# Patient Record
Sex: FEMALE | Race: WHITE | NOT HISPANIC OR LATINO | Employment: OTHER | URBAN - METROPOLITAN AREA
[De-identification: names, ages, dates, MRNs, and addresses within clinical notes are randomized per-mention and may not be internally consistent; named-entity substitution may affect disease eponyms.]

---

## 2023-10-05 ENCOUNTER — TELEPHONE (OUTPATIENT)
Dept: PRIMARY CARE | Facility: CLINIC | Age: 53
End: 2023-10-05
Payer: COMMERCIAL

## 2023-10-05 NOTE — TELEPHONE ENCOUNTER
Pt would like to know if [facility] sent the prescription ulternating pressure mattress. Please advise  [phone]

## 2023-10-12 PROBLEM — G80.9 CEREBRAL PALSY, UNSPECIFIED (MULTI): Status: ACTIVE | Noted: 2019-03-06

## 2023-10-12 PROBLEM — G89.4 CHRONIC PAIN SYNDROME: Status: ACTIVE | Noted: 2018-10-30

## 2023-10-12 PROBLEM — R06.2 WHEEZING: Status: ACTIVE | Noted: 2023-10-12

## 2023-11-05 DIAGNOSIS — G89.4 CHRONIC PAIN SYNDROME: Primary | ICD-10-CM

## 2023-11-07 DIAGNOSIS — R53.83 OTHER FATIGUE: Primary | ICD-10-CM

## 2023-11-07 DIAGNOSIS — B37.2 YEAST DERMATITIS: ICD-10-CM

## 2023-11-07 RX ORDER — NYSTATIN 100000 [USP'U]/G
1 POWDER TOPICAL 2 TIMES DAILY
Qty: 60 G | Refills: 3 | Status: SHIPPED | OUTPATIENT
Start: 2023-11-07 | End: 2024-01-25

## 2023-11-07 RX ORDER — NYSTATIN 100000 [USP'U]/G
1 POWDER TOPICAL 2 TIMES DAILY
COMMUNITY
Start: 2023-01-31 | End: 2023-11-07 | Stop reason: SDUPTHER

## 2023-11-07 RX ORDER — FERROUS SULFATE 325(65) MG
65 TABLET ORAL DAILY
COMMUNITY
Start: 2022-12-01 | End: 2023-11-07 | Stop reason: SDUPTHER

## 2023-11-07 RX ORDER — FERROUS SULFATE 325(65) MG
65 TABLET ORAL DAILY
Qty: 90 TABLET | Refills: 1 | Status: SHIPPED | OUTPATIENT
Start: 2023-11-07 | End: 2024-04-22

## 2023-11-07 RX ORDER — BUPRENORPHINE HYDROCHLORIDE 150 UG/1
FILM, SOLUBLE BUCCAL
COMMUNITY
End: 2024-03-13

## 2023-11-07 NOTE — TELEPHONE ENCOUNTER
Rx Refill Request Telephone Encounter    Name:  Rita Sabillon  :  [id number]  Medication Name:   Ferosul 325 mg. Nystatin powder [id number]             Specific Pharmacy location:  [facility] direct [phone]  Date of last appointment:    Date of next appointment:    Best number to reach patient:

## 2023-11-07 NOTE — TELEPHONE ENCOUNTER
KUSH CALLED INSISTING THAT HER LAST SCRIPT FOR OXYCODONE WAS FILLED ON OCTOBER 3,2023.  I CALLED HER AND TOLD HER THAT WAS NOT POSSIBLE  SINCE THE SCRIPT WAS NOT AVAILABLE FOR REFILL TIL OCT. 15,2023. SHE INSISTED THAT I CALLED THE DRUG STORE WHICH I DID AND THEY STATED THE SCRIPT WAS FILLED OCTOBER 17,2023. I CALLED HER BACK AGAIN AND SHE STATED THAT THAT WAS NOT TRUE AND THAT SHE NEEDED A BRIDGE SCRIPT TILL HER APPOINTMENT ON THE 17TH.  I TOLD HER HER APPOINTMENT WAS ON THE 14TH AND SHE SHOULD NOT HAVE RUN OUT OF HER OXYCODONE ALREADY.

## 2023-11-08 RX ORDER — BUPRENORPHINE HYDROCHLORIDE 150 UG/1
FILM, SOLUBLE BUCCAL
Qty: 60 EACH | OUTPATIENT
Start: 2023-11-08

## 2023-11-08 RX ORDER — BUPRENORPHINE HCL 150 MCG
150 FILM, MEDICATED (EA) BUCCAL EVERY 12 HOURS SCHEDULED
Qty: 60 EACH | Refills: 2 | Status: SHIPPED | OUTPATIENT
Start: 2023-11-08 | End: 2024-01-09 | Stop reason: SDUPTHER

## 2023-11-08 NOTE — TELEPHONE ENCOUNTER
The patient was mistaken and was calling about Belbuca, not Oxycodone. She is due for her Belbuca refill. This was sent to her pharmacy

## 2023-11-13 NOTE — PROGRESS NOTES
[facility] Pain Management  Follow Up Office Visit Note 2023    Patient Information: Rita Sabillon, MRN: [id number], : 1970   Primary Care/Referring Physician: Michelle Griggs PA-C, [address] [facility] [address] / [place]*     Chief Complaint: Neck pain  Interval History: At her last office visit I made no changes.      Today she reports no changes to her pain since last seen. She continues to report improvement in pain and function with minimal side effects from current Percocet and Belbuca    Brief History of Pain: Ms. Rita Sabillon is a 53 y.o. female with a PMHx of cerebral palsy, asthma who presents today for follow up regarding chronic neck pain and muscle spasms.            Current Pain Medications: Percocet 7.5/325 mg q12h PRN, Belbuca 150 mcg q12h, Baclofen 20 mg TID, Cyclobenzaprine 5 mg q8h PRN, Diclofenac 50 mg BID, Gabapentin 600 mg TID  Previously Tried Pain Medications:    Relevant Surgeries: Leg muscle release surgeries in the , extensive spine fusion in the   Injections: She reports getting injections in her neck in the past but they stopped working.  Physical/Occupational Therapy: The patient does not wish to pursue PT/OT at this time.    Medications:   Current Outpatient Medications   Medication Instructions    albuterol 90 mcg/actuation inhaler 2 puffs, inhalation, Every 6 hours PRN    albuterol 90 mcg/actuation inhaler 2 puffs, inhalation, Every 6 hours PRN    Belbuca 150 mcg buccal film PLACE 1 STRIP IN CHEEK EVERY 12 HOURS FOR 30 DAYS    buprenorphine (BELBUCA) 150 mcg, buccal, Every 12 hours scheduled    docusate sodium (Enemeez) 283 mg/5 mL enema 1 enema, rectal, Daily    docusate sodium (Enemeez) 283 mg/5 mL enema 1 enema, rectal, Daily    ferrous sulfate (FeroSuL) 325 (65 Fe) MG tablet 65 mg of iron, oral, Daily    gabapentin (Neurontin) 600 mg tablet 1 TAB BY MOUTH THREE TIMES A DAY    gabapentin (NEURONTIN) 600 mg, oral, 3 times  daily    nystatin (Mycostatin) 100,000 unit/gram powder 1 Application, Topical, 2 times daily, To affected area    [START ON 12/16/2023] oxyCODONE-acetaminophen (Percocet) 7.5-325 mg tablet 1 tablet, oral, 2 times daily PRN    [START ON 11/16/2023] oxyCODONE-acetaminophen (Percocet) 7.5-325 mg tablet 1 tablet, oral, 2 times daily PRN      Allergies: No Known Allergies    Past Medical & Surgical History:  Past Medical History:   Diagnosis Date    Asthma     Cerebral palsy (CMS/HCC)     Neuropathy     HANDS AND FEET    Osteoarthritis       Past Surgical History:   Procedure Laterality Date    OTHER SURGICAL HISTORY  07/13/2022    Back surgery    OTHER SURGICAL HISTORY  07/13/2022    Gallbladder surgery    OTHER SURGICAL HISTORY  07/20/2022    Suprapubic catheter insertion       No family history on file.  Social History     Socioeconomic History    Marital status: Single     Spouse name: Not on file    Number of children: Not on file    Years of education: Not on file    Highest education level: Not on file   Occupational History    Not on file   Tobacco Use    Smoking status: Never    Smokeless tobacco: Never   Substance and Sexual Activity    Alcohol use: Never    Drug use: Never    Sexual activity: Not on file   Other Topics Concern    Not on file   Social History Narrative    Not on file     Social Determinants of Health     Financial Resource Strain: Not on file   Food Insecurity: Not on file   Transportation Needs: Not on file   Physical Activity: Not on file   Stress: Not on file   Social Connections: Not on file   Intimate Partner Violence: Not on file   Housing Stability: Not on file       Problems, Past medical history, past surgical history, Medications, allergies, social and family history reviewed and as per the electronic medical record from today's encounter    Review of Systems:  CONST: No fever, chills, fatigue, weight changes  EYES: No loss of vision  ENT: No hearing loss, tinnitus  CV: No chest  "pain, palpitations  RESP: No dyspnea, shortness of breath, cough  GI: No stool incontinence, nausea, vomiting  : No urinary incontinence  MSK: No joint swelling  SKIN: No rash, no hives  NEURO: No headache, dizziness, weakness, paresthesias  PSYCH: No anxiety, depression or suicidal ideation  HEM/LYMPH: No easy bruising or bleeding  All other systems reviewed are negative     Physical Exam:  Vitals: BP (!) 134/91   Pulse 60   Ht 1.448 m (4' 9\")   Wt 72.6 kg (160 lb)   BMI 34.62 kg/m²   General: No apparent distress. Alert, appropriate, oriented x 3. Mood generally positive, affect congruent. Speaking in full sentences.   HENT: Normocephalic, atraumatic. Hearing intact.  Eyes: Pupils equal and round  Neck: Supple, trachea midline  Lungs: Symmetric respiratory excursion on visual exam, nonlabored breathing.   Extremities: No cyanosis noted in extremities.  Skin: No rashes, lesions noted.  Neuro: Alert and appropriate. Using a motorized wheelchair.    Laboratory Data:  The following laboratory data were reviewed during this visit:   Lab Results   Component Value Date    WBC 13.5 (H) 10/08/2021    RBC 4.06 10/08/2021    HGB 12.5 10/08/2021    HCT 38.9 10/08/2021     10/08/2021      Lab Results   Component Value Date    INR 1.0 10/08/2021     Lab Results   Component Value Date    CREATININE 0.3 (L) 10/08/2021    HGBA1C 6.0 2021       Imaging:  The following imaging impressions were reviewed by me during this visit:    - Imagin CT cervical spine shows DDD at C5-C6 with no significant canal stenosis seen. Mild cervical spondylosis resulting in mild narrowing of left-sided neural foramina at C5-C6 and C6-C7. Postoperative changes of the included upper thoracic spine.    I also personally reviewed the images from the above studies myself. These images and my interpretation of them contributed to the management and decision making of the patient's medical plan.    ASSESSMENT:  Ms. Rita Sabillon is " a 53 y.o. female with neck pain that is consistent with:    1. Cervicalgia    2. Long-term current use of opiate analgesic    3. Chronic pain syndrome        PLAN:  Radiology: I reviewed her CT cervical spine from 2016 which shows DDD at C5-C6 with no significant canal stenosis seen. Mild cervical spondylosis resulting in mild narrowing of left-sided neural foramina at C5-C6 and C6-C7. Postoperative changes of the included upper thoracic spine. No new diagnostics at this time    Physically: No changes at this time    Psychologically: No needs at this time    Medication: No changes to medication made today. Refills for Percocet provided today. Last oral toxicology appropriate    Duration: Multiple years    Intervention: No plans for intervention. She is hopefully getting a Baclofen pump implanted in the future but has been having difficulty getting this coordinated with her transportation    I will refill the patient's opioids today for 2 month.  The patient continues to see benefit and improvement in their quality of life and ability to maintain ADLs.  Patient educated about the risks of taking opioids and operating a motor vehicle.  Patient reports no adverse side effects to current medication regimen.  Current regimen does allow patient to maintain ADLs.  Patient reports no new neurologic symptoms, new pain areas, or exacerbation in pain today.  Patient reports they are happy with current treatment care path.    OAR[place] was reviewed and was consistent with the history.    Patient has been educated on the risks, benefits, and alternatives of controlled substances as well as the proper way to store these medications.  The patient and I discussed the nature of this medication and its side effects.  We discussed tolerance, physical dependence, psychological dependence, addiction and opioid-induced hyperalgesia.  We discussed the potential need to wean from this medication.  We discussed the availability of programs that  can help with this process if necessary.  We discussed safety issues related to opioids including safe storage.  We discussed the fact that the patient should not drive an automobile or operate heavy machinery while taking this medication.  A prescription for naloxone was offered to the patient.  The patient will be re-evaluated for the need to continue opioid therapy in 60-90 days.  A Pill Count Was Complete today and was consistent with prescriptions filled.  Most recent Toxicology reviewed and appropriate      Sincerely,  Sunil Morrow MD  [facility] - [place]

## 2023-11-14 ENCOUNTER — OFFICE VISIT (OUTPATIENT)
Dept: PAIN MEDICINE | Facility: CLINIC | Age: 53
End: 2023-11-14
Payer: COMMERCIAL

## 2023-11-14 VITALS
HEIGHT: 57 IN | SYSTOLIC BLOOD PRESSURE: 134 MMHG | BODY MASS INDEX: 34.52 KG/M2 | HEART RATE: 60 BPM | DIASTOLIC BLOOD PRESSURE: 91 MMHG | WEIGHT: 160 LBS

## 2023-11-14 DIAGNOSIS — G89.4 CHRONIC PAIN SYNDROME: ICD-10-CM

## 2023-11-14 DIAGNOSIS — M54.2 CERVICALGIA: Primary | ICD-10-CM

## 2023-11-14 DIAGNOSIS — Z79.891 LONG-TERM CURRENT USE OF OPIATE ANALGESIC: ICD-10-CM

## 2023-11-14 PROCEDURE — 99214 OFFICE O/P EST MOD 30 MIN: CPT | Performed by: STUDENT IN AN ORGANIZED HEALTH CARE EDUCATION/TRAINING PROGRAM

## 2023-11-14 PROCEDURE — 1036F TOBACCO NON-USER: CPT | Performed by: STUDENT IN AN ORGANIZED HEALTH CARE EDUCATION/TRAINING PROGRAM

## 2023-11-14 RX ORDER — OXYCODONE AND ACETAMINOPHEN 7.5; 325 MG/1; MG/1
1 TABLET ORAL 2 TIMES DAILY PRN
Qty: 60 TABLET | Refills: 0 | Status: SHIPPED | OUTPATIENT
Start: 2023-11-16 | End: 2023-12-16

## 2023-11-14 RX ORDER — OXYCODONE AND ACETAMINOPHEN 7.5; 325 MG/1; MG/1
1 TABLET ORAL 2 TIMES DAILY PRN
Qty: 60 TABLET | Refills: 0 | Status: SHIPPED | OUTPATIENT
Start: 2023-12-16 | End: 2024-01-09 | Stop reason: SDUPTHER

## 2023-11-14 ASSESSMENT — PATIENT HEALTH QUESTIONNAIRE - PHQ9
1. LITTLE INTEREST OR PLEASURE IN DOING THINGS: NOT AT ALL
SUM OF ALL RESPONSES TO PHQ9 QUESTIONS 1 AND 2: 0
2. FEELING DOWN, DEPRESSED OR HOPELESS: NOT AT ALL

## 2023-11-14 ASSESSMENT — PAIN DESCRIPTION - DESCRIPTORS: DESCRIPTORS: ACHING

## 2023-11-14 ASSESSMENT — PAIN - FUNCTIONAL ASSESSMENT: PAIN_FUNCTIONAL_ASSESSMENT: 0-10

## 2023-11-14 ASSESSMENT — PAIN SCALES - GENERAL: PAINLEVEL_OUTOF10: 7

## 2023-11-14 NOTE — PROGRESS NOTES
MEDICATION NAME: Oxycodone  STRENGTH: 7.5-325mg  LAST FILL DATE: 10/17/23  DATE LAST TAKEN: 23  QUANTITY FILLED: 60  QUANTITY REMAIN  COUNT COMPLETED BY: JOHNNY AVINA MA and ALLYSON HAGEN MA      UDS LAST COMPLETED:   CONTROLLED SUBSTANCES AGREEMENT LAST SIGNED:   ORT LAST COMPLETED:  Modified [employer] disability form filled out annually.    MEDICATION NAME: Belbuca  STRENGTH: 1450mcg  LAST FILL DATE: 23  DATE LAST TAKEN: 23  QUANTITY FILLED: 60  QUANTITY REMAIN  COUNT COMPLETED BY: JOHNNY AVINA MA and ALLYSON HAGEN MA      UDS LAST COMPLETED:   CONTROLLED SUBSTANCES AGREEMENT LAST SIGNED:   ORT LAST COMPLETED:  Modified [employer] disability form filled out annually.

## 2023-12-05 DIAGNOSIS — G80.0 SPASTIC QUADRIPLEGIC CEREBRAL PALSY (MULTI): ICD-10-CM

## 2023-12-05 DIAGNOSIS — G82.50 SPASTIC QUADRIPLEGIA (MULTI): ICD-10-CM

## 2023-12-05 DIAGNOSIS — G89.4 CHRONIC PAIN SYNDROME: ICD-10-CM

## 2023-12-05 DIAGNOSIS — K59.04 CHRONIC IDIOPATHIC CONSTIPATION: ICD-10-CM

## 2023-12-05 DIAGNOSIS — R06.2 WHEEZING: ICD-10-CM

## 2023-12-05 PROBLEM — J45.20 MILD INTERMITTENT ASTHMA (HHS-HCC): Status: ACTIVE | Noted: 2020-09-17

## 2023-12-05 PROBLEM — R31.0 FRANK HEMATURIA: Status: RESOLVED | Noted: 2023-12-05 | Resolved: 2023-12-05

## 2023-12-05 PROBLEM — N32.89 BLADDER SPASMS: Status: ACTIVE | Noted: 2019-06-11

## 2023-12-05 PROBLEM — L03.115 CELLULITIS OF RIGHT LEG: Status: RESOLVED | Noted: 2019-08-01 | Resolved: 2023-12-05

## 2023-12-05 PROBLEM — M54.50 LOW BACK PAIN, UNSPECIFIED: Status: RESOLVED | Noted: 2023-12-05 | Resolved: 2023-12-05

## 2023-12-05 PROBLEM — R30.9 URINARY PAIN: Status: RESOLVED | Noted: 2020-03-09 | Resolved: 2023-12-05

## 2023-12-05 PROBLEM — J45.909 UNSPECIFIED ASTHMA, UNCOMPLICATED (HHS-HCC): Status: ACTIVE | Noted: 2018-10-30

## 2023-12-05 PROBLEM — G62.9 NEUROPATHY: Status: ACTIVE | Noted: 2022-01-29

## 2023-12-05 PROBLEM — N30.91 HEMORRHAGIC CYSTITIS: Status: RESOLVED | Noted: 2021-10-14 | Resolved: 2023-12-05

## 2023-12-05 PROBLEM — H93.90 EAR DISORDER: Status: ACTIVE | Noted: 2023-12-05

## 2023-12-05 PROBLEM — B02.9 SHINGLES: Status: RESOLVED | Noted: 2020-06-15 | Resolved: 2023-12-05

## 2023-12-05 PROBLEM — M48.02 SPINAL STENOSIS IN CERVICAL REGION: Status: ACTIVE | Noted: 2023-12-05

## 2023-12-05 PROBLEM — R25.2 SPASTICITY: Status: ACTIVE | Noted: 2023-12-05

## 2023-12-05 PROBLEM — J45.20 INTRINSIC ASTHMA WITHOUT STATUS ASTHMATICUS (HHS-HCC): Status: RESOLVED | Noted: 2022-01-27 | Resolved: 2023-12-05

## 2023-12-05 PROBLEM — M79.10 MUSCLE TENSION PAIN: Status: ACTIVE | Noted: 2022-03-17

## 2023-12-05 PROBLEM — R39.9 UTI SYMPTOMS: Status: RESOLVED | Noted: 2019-04-23 | Resolved: 2023-12-05

## 2023-12-05 PROBLEM — G43.009 MIGRAINE WITHOUT AURA, NOT INTRACTABLE, WITHOUT STATUS MIGRAINOSUS: Status: ACTIVE | Noted: 2018-06-22

## 2023-12-05 PROBLEM — Z97.8 FOLEY CATHETER PRESENT: Status: RESOLVED | Noted: 2020-12-12 | Resolved: 2023-12-05

## 2023-12-05 PROBLEM — R82.90 CLOUDY URINE: Status: RESOLVED | Noted: 2020-12-12 | Resolved: 2023-12-05

## 2023-12-05 PROBLEM — R74.01 ELEVATED TRANSAMINASE LEVEL: Status: ACTIVE | Noted: 2021-05-12

## 2023-12-05 PROBLEM — S93.402A LEFT ANKLE SPRAIN: Status: RESOLVED | Noted: 2020-02-04 | Resolved: 2023-12-05

## 2023-12-05 PROBLEM — M81.0 AGE RELATED OSTEOPOROSIS: Status: ACTIVE | Noted: 2018-10-30

## 2023-12-05 PROBLEM — M54.12 RADICULOPATHY OF CERVICAL REGION: Status: ACTIVE | Noted: 2023-12-05

## 2023-12-05 PROBLEM — K21.9 GASTROESOPHAGEAL REFLUX DISEASE: Status: ACTIVE | Noted: 2022-01-27

## 2023-12-05 PROBLEM — R19.5 POSITIVE COLORECTAL CANCER SCREENING USING COLOGUARD TEST: Status: ACTIVE | Noted: 2021-06-21

## 2023-12-05 PROBLEM — E78.5 HYPERLIPIDEMIA, UNSPECIFIED: Status: ACTIVE | Noted: 2018-10-30

## 2023-12-05 PROBLEM — S99.919A INJURY OF ANKLE: Status: RESOLVED | Noted: 2023-12-05 | Resolved: 2023-12-05

## 2023-12-05 PROBLEM — M19.90 ARTHRITIS: Status: ACTIVE | Noted: 2023-12-05

## 2023-12-05 PROBLEM — R03.0 ELEVATED BLOOD PRESSURE READING: Status: ACTIVE | Noted: 2019-03-06

## 2023-12-05 PROBLEM — N39.0 URINARY TRACT INFECTION: Status: RESOLVED | Noted: 2020-09-17 | Resolved: 2023-12-05

## 2023-12-05 PROBLEM — G43.909 MIGRAINE HEADACHE: Status: ACTIVE | Noted: 2021-10-16

## 2023-12-05 PROBLEM — H10.9 CONJUNCTIVITIS: Status: RESOLVED | Noted: 2023-12-05 | Resolved: 2023-12-05

## 2023-12-05 PROBLEM — R06.00 DYSPNEA: Status: RESOLVED | Noted: 2023-12-05 | Resolved: 2023-12-05

## 2023-12-05 PROBLEM — H53.9 UNSPECIFIED VISUAL DISTURBANCE: Status: ACTIVE | Noted: 2021-09-23

## 2023-12-05 PROBLEM — R30.0 DYSURIA: Status: RESOLVED | Noted: 2023-12-05 | Resolved: 2023-12-05

## 2023-12-05 PROBLEM — R82.90 ABNORMAL URINE SEDIMENT: Status: RESOLVED | Noted: 2019-06-11 | Resolved: 2023-12-05

## 2023-12-05 RX ORDER — CHOLECALCIFEROL (VITAMIN D3) 125 MCG
CAPSULE ORAL
COMMUNITY
End: 2024-03-28 | Stop reason: SDUPTHER

## 2023-12-05 RX ORDER — CYCLOBENZAPRINE HCL 5 MG
TABLET ORAL
COMMUNITY
End: 2024-04-29 | Stop reason: SDUPTHER

## 2023-12-05 RX ORDER — NARATRIPTAN 2.5 MG/1
TABLET ORAL
COMMUNITY
Start: 2023-04-30 | End: 2024-03-26

## 2023-12-05 RX ORDER — PRUCALOPRIDE 2 MG/1
TABLET, FILM COATED ORAL
COMMUNITY
Start: 2022-03-10

## 2023-12-05 RX ORDER — SPIRONOLACTONE 25 MG/1
TABLET ORAL
COMMUNITY
Start: 2022-01-04

## 2023-12-05 RX ORDER — DOXEPIN HYDROCHLORIDE 50 MG/1
CAPSULE ORAL
Qty: 28 CAPSULE | Refills: 10 | Status: SHIPPED | OUTPATIENT
Start: 2023-12-05

## 2023-12-05 RX ORDER — ASPIRIN 81 MG/1
TABLET ORAL
COMMUNITY
Start: 2022-10-03

## 2023-12-05 RX ORDER — NALOXONE HYDROCHLORIDE 4 MG/.1ML
SPRAY NASAL
COMMUNITY
Start: 2023-11-09

## 2023-12-05 RX ORDER — GABAPENTIN 600 MG/1
TABLET ORAL
Qty: 84 TABLET | Refills: 10 | Status: SHIPPED | OUTPATIENT
Start: 2023-12-05

## 2023-12-05 RX ORDER — DOCUSATE SODIUM 283 MG/5ML
LIQUID RECTAL
Qty: 150 ML | Refills: 10 | Status: SHIPPED | OUTPATIENT
Start: 2023-12-05

## 2023-12-05 RX ORDER — BACLOFEN 20 MG/1
TABLET ORAL
Qty: 84 TABLET | Refills: 10 | Status: SHIPPED | OUTPATIENT
Start: 2023-12-05

## 2023-12-05 RX ORDER — VENLAFAXINE HYDROCHLORIDE 75 MG/1
CAPSULE, EXTENDED RELEASE ORAL
COMMUNITY
End: 2024-04-22

## 2023-12-05 RX ORDER — ALBUTEROL SULFATE 90 UG/1
2 AEROSOL, METERED RESPIRATORY (INHALATION) EVERY 6 HOURS PRN
Qty: 8.5 G | Refills: 10 | Status: SHIPPED | OUTPATIENT
Start: 2023-12-05

## 2023-12-05 RX ORDER — DOXEPIN HYDROCHLORIDE 50 MG/1
CAPSULE ORAL
COMMUNITY
Start: 2022-02-02 | End: 2023-12-05

## 2023-12-05 RX ORDER — MIRABEGRON 25 MG/1
TABLET, FILM COATED, EXTENDED RELEASE ORAL
COMMUNITY
Start: 2018-10-31

## 2023-12-05 RX ORDER — OMEPRAZOLE 40 MG/1
CAPSULE, DELAYED RELEASE ORAL
COMMUNITY
Start: 2021-06-01 | End: 2024-03-28 | Stop reason: ALTCHOICE

## 2023-12-05 RX ORDER — PERMETHRIN 50 MG/G
CREAM TOPICAL
COMMUNITY
Start: 2023-02-09 | End: 2024-03-28 | Stop reason: ALTCHOICE

## 2023-12-05 RX ORDER — ATORVASTATIN CALCIUM 20 MG/1
TABLET, FILM COATED ORAL EVERY 24 HOURS
COMMUNITY
Start: 2022-01-04

## 2023-12-05 RX ORDER — FLUTICASONE FUROATE 200 UG/1
POWDER RESPIRATORY (INHALATION)
COMMUNITY
Start: 2022-03-03 | End: 2024-02-27

## 2023-12-05 RX ORDER — BACLOFEN 20 MG/1
TABLET ORAL
COMMUNITY
Start: 2022-02-02 | End: 2023-12-05

## 2023-12-05 RX ORDER — FLUCONAZOLE 150 MG/1
TABLET ORAL
COMMUNITY
Start: 2023-05-25

## 2023-12-05 RX ORDER — DANTROLENE SODIUM 25 MG/1
CAPSULE ORAL
COMMUNITY
Start: 2022-07-13 | End: 2024-03-28 | Stop reason: ALTCHOICE

## 2023-12-05 RX ORDER — METOPROLOL SUCCINATE 25 MG/1
TABLET, EXTENDED RELEASE ORAL
COMMUNITY
Start: 2021-04-08

## 2023-12-05 RX ORDER — ERGOCALCIFEROL 1.25 MG/1
CAPSULE ORAL
COMMUNITY
Start: 2023-03-02 | End: 2024-01-25

## 2023-12-05 RX ORDER — OXYBUTYNIN CHLORIDE 5 MG/1
TABLET ORAL
COMMUNITY
Start: 2021-07-08

## 2023-12-05 RX ORDER — CETIRIZINE HYDROCHLORIDE 10 MG/1
TABLET ORAL
COMMUNITY
Start: 2023-04-30 | End: 2024-02-27

## 2023-12-05 RX ORDER — FLUTICASONE PROPIONATE 50 MCG
SPRAY, SUSPENSION (ML) NASAL
COMMUNITY
Start: 2023-03-02 | End: 2024-01-25

## 2023-12-05 RX ORDER — MONTELUKAST SODIUM 10 MG/1
TABLET ORAL
COMMUNITY
Start: 2022-02-02 | End: 2024-04-22

## 2023-12-05 RX ORDER — PANTOPRAZOLE SODIUM 40 MG/1
TABLET, DELAYED RELEASE ORAL EVERY 24 HOURS
COMMUNITY
Start: 2022-08-22

## 2023-12-05 RX ORDER — FAMOTIDINE 40 MG/1
TABLET, FILM COATED ORAL EVERY 24 HOURS
COMMUNITY
Start: 2022-11-28 | End: 2024-03-28 | Stop reason: ALTCHOICE

## 2023-12-05 RX ORDER — DICLOFENAC SODIUM 50 MG/1
TABLET, DELAYED RELEASE ORAL
COMMUNITY
Start: 2022-02-10

## 2024-01-08 NOTE — PROGRESS NOTES
[facility] Pain Management  Follow Up Office Visit Note 2024    Patient Information: Rita Sabillon, MRN: [id number], : 1970   Primary Care/Referring Physician: Michelle Griggs PA-C, [address] [facility] [address] / [place]     Chief Complaint: Neck pain  Interval History: At her last office visit I made no changes.      Today she reports no changes to her pain since last seen. She has been having slightly more hip pain since last seen and is going to start some PT for this soon. She continues to report improvement in pain and function with minimal side effects from current Percocet and Belbuca    Brief History of Pain: Ms. Rita Sabillon is a 53 y.o. female with a PMHx of cerebral palsy, asthma who presents today for follow up regarding chronic neck pain and muscle spasms.            Current Pain Medications: Percocet 7.5/325 mg q12h PRN, Belbuca 150 mcg q12h, Baclofen 20 mg TID, Cyclobenzaprine 5 mg q8h PRN, Diclofenac 50 mg BID, Gabapentin 600 mg TID  Previously Tried Pain Medications:    Relevant Surgeries: Leg muscle release surgeries in the , extensive spine fusion in the   Injections: She reports getting injections in her neck in the past but they stopped working.  Physical/Occupational Therapy: The patient does not wish to pursue PT/OT at this time.    Medications:   Current Outpatient Medications   Medication Instructions    albuterol 90 mcg/actuation inhaler 2 puffs, inhalation, Every 6 hours PRN    albuterol 90 mcg/actuation inhaler 2 puffs, inhalation, Every 6 hours PRN    Arnuity Ellipta 200 mcg/actuation inhaler INHALE ONE blister with device ONCE DAILY Inhalation for 30    aspirin 81 mg EC tablet TAKE ONE TABLET BY MOUTH ONCE DAILY Oral for 30    atorvastatin (Lipitor) 20 mg tablet Every 24 hours    baclofen (Lioresal) 20 mg tablet 1 TAB BY MOUTH THREE TIMES A DAY    Belbuca 150 mcg buccal film PLACE 1 STRIP IN CHEEK EVERY 12 HOURS FOR 30 DAYS     buprenorphine (BELBUCA) 150 mcg, buccal, Every 12 hours scheduled    cetirizine (ZyrTEC) 10 mg tablet See Instructions, Instructions: 1 TAB BY MOUTH ONCE A DAY, # 28 EA, 9 Refill(s), Type: Maintenance, Pharmacy: [id number], 1 TAB BY MOUTH ONCE A DAY    cholecalciferol (Vitamin D-3) 125 MCG (5000 UT) capsule oral    cyclobenzaprine (Flexeril) 5 mg tablet TAKE 1 TABLET BY MOUTH AS NEEDED EVERY 8 HOURS FOR 30 DAYS    dantrolene (Dantrium) 25 mg capsule oral    diclofenac (Voltaren) 50 mg EC tablet TAKE ONE TABLET BY MOUTH TWICE DAILY Oral for 30    docusate sodium (Enemeez) 283 mg/5 mL enema 1 RECTALLY ONCE A DAY    doxepin (SINEquan) 50 mg capsule 1 CAP BY MOUTH DAILY AT BEDTIME    ergocalciferol (Vitamin D-2) 1.25 MG (08435 UT) capsule See Instructions, Instructions: 1 CAP BY MOUTH WEEKLY, # 4 EA, 10 Refill(s), Type: Maintenance, Pharmacy: [facility], 1 CAP BY MOUTH WEEKLY    ferrous sulfate (FeroSuL) 325 (65 Fe) MG tablet 65 mg of iron, oral, Daily    fluconazole (Diflucan) 150 mg tablet oral    fluticasone (Flonase) 50 mcg/actuation nasal spray See Instructions, Instructions: 2 SPRAYS NASALLY ONCE A DAY AS DIRECTED, # 16 g, 10 Refill(s), Type: Maintenance, Pharmacy: [facility], 2 SPRAYS NASALLY ONCE A DAY AS DIRECTED    gabapentin (Neurontin) 600 mg tablet 1 TAB BY MOUTH THREE TIMES A DAY    metoprolol succinate XL (Toprol-XL) 25 mg 24 hr tablet TAKE ONE TABLET BY MOUTH ONCE DAILY Oral for 30    montelukast (Singulair) 10 mg tablet TAKE ONE TABLET BY MOUTH ONCE DAILY Oral for 30    Motegrity 2 mg tablet oral    Myrbetriq 25 mg tablet extended release 24 hr 24 hr tablet TAKE ONE TABLET BY MOUTH ONCE DAILY Oral for 30    naloxone (Narcan) 4 mg/0.1 mL nasal spray ADMINISTER A SINGLE SPRAY INTRANASALLY INTO ONE NOSTRIL. CALL 911. MAY REPEAT X 1.    naratriptan (Amerge) 2.5 mg tablet See Instructions, Instructions: 1 TAB BY MOUTH AS NEEDED AT ONSET OF MIGRAINE.;MAY REPEAT  DOSE ONCE AFTER 4 HOURS IF NEEDED, # 9 EA, 10 Refill(s), Type: Maintenance, Pharmacy: [facility], 1 TAB BY MOUTH AS NEEDED AT ONSET OF MIGRAINE.;MAY REPEAT DOSE ONCE AFTER 4 HOURS IF NEEDED    nystatin (Mycostatin) 100,000 unit/gram powder 1 Application, Topical, 2 times daily, To affected area    omeprazole (PriLOSEC) 40 mg DR capsule oral    oxybutynin (Ditropan) 5 mg tablet 1 tablet Orally three times per day    [START ON 2/15/2024] oxyCODONE-acetaminophen (Percocet) 7.5-325 mg tablet 1 tablet, oral, 2 times daily PRN    [START ON 1/16/2024] oxyCODONE-acetaminophen (Percocet) 7.5-325 mg tablet 1 tablet, oral, 2 times daily PRN    pantoprazole (ProtoNix) 40 mg EC tablet Every 24 hours    Pepcid 40 mg tablet Every 24 hours    permethrin (Elimite) 5 % cream APPLY TOPICALLY ONCE TO HEAD AND HAIR REMOVE AFTER 10 MINUTES    spironolactone (Aldactone) 25 mg tablet oral    venlafaxine XR (Effexor-XR) 75 mg 24 hr capsule TAKE ONE CAPSULE BY MOUTH ONCE DAILY Oral for 30      Allergies:   No Known Allergies      Past Medical & Surgical History:  Past Medical History:   Diagnosis Date    Abnormal urine sediment 06/11/2019    Asthma     Cellulitis of right leg 08/01/2019    Cerebral palsy (CMS/HCC)     Conjunctivitis 12/05/2023    Dyspnea 12/05/2023    Blanco catheter present 12/12/2020    Jaskaran hematuria 12/05/2023    Hemorrhagic cystitis 10/14/2021    Left ankle sprain 02/04/2020    Low back pain, unspecified 12/05/2023    Neuropathy     HANDS AND FEET    Osteoarthritis     Urinary pain 03/09/2020    Urinary tract infection 09/17/2020      Past Surgical History:   Procedure Laterality Date    OTHER SURGICAL HISTORY  07/13/2022    Back surgery    OTHER SURGICAL HISTORY  07/13/2022    Gallbladder surgery    OTHER SURGICAL HISTORY  07/20/2022    Suprapubic catheter insertion       No family history on file.  Social History     Socioeconomic History    Marital status: Single     Spouse name: Not on file    Number  "of children: Not on file    Years of education: Not on file    Highest education level: Not on file   Occupational History    Not on file   Tobacco Use    Smoking status: Never    Smokeless tobacco: Never   Substance and Sexual Activity    Alcohol use: Never    Drug use: Never    Sexual activity: Not on file   Other Topics Concern    Not on file   Social History Narrative    Not on file     Social Determinants of Health     Financial Resource Strain: Not on file   Food Insecurity: Not on file   Transportation Needs: Not on file   Physical Activity: Not on file   Stress: Not on file   Social Connections: Not on file   Intimate Partner Violence: Not on file   Housing Stability: Not on file       Problems, Past medical history, past surgical history, Medications, allergies, social and family history reviewed and as per the electronic medical record from today's encounter    Review of Systems:  CONST: No fever, chills, fatigue, weight changes  EYES: No loss of vision  ENT: No hearing loss, tinnitus  CV: No chest pain, palpitations  RESP: No dyspnea, shortness of breath, cough  GI: No stool incontinence, nausea, vomiting  : No urinary incontinence  MSK: No joint swelling  SKIN: No rash, no hives  NEURO: No headache, dizziness  PSYCH: No anxiety, depression or suicidal ideation  HEM/LYMPH: No easy bruising or bleeding  All other systems reviewed are negative     Physical Exam:  Vitals: /71   Pulse 94   Resp 16   Ht 1.448 m (4' 9\")   Wt 72.6 kg (160 lb)   BMI 34.62 kg/m²   General: No apparent distress. Alert, appropriate, oriented x 3. Mood generally positive, affect congruent. Speaking in full sentences.   HENT: Normocephalic, atraumatic. Hearing intact.  Eyes: Pupils equal and round  Neck: Supple, trachea midline  Lungs: Symmetric respiratory excursion on visual exam, nonlabored breathing.   Extremities: No cyanosis noted in extremities.  Skin: No rashes, lesions noted.  Neuro: Alert and appropriate. Using " a motorized wheelchair.    Laboratory Data:  The following laboratory data were reviewed during this visit:   Lab Results   Component Value Date    WBC 13.5 (H) 10/08/2021    RBC 4.06 10/08/2021    HGB 12.5 10/08/2021    HCT 38.9 10/08/2021     10/08/2021      Lab Results   Component Value Date    INR 1.0 10/08/2021     Lab Results   Component Value Date    CREATININE 0.3 (L) 10/08/2021    HGBA1C 6.0 2021       Imaging:  The following imaging impressions were reviewed by me during this visit:    - Imagin CT cervical spine shows DDD at C5-C6 with no significant canal stenosis seen. Mild cervical spondylosis resulting in mild narrowing of left-sided neural foramina at C5-C6 and C6-C7. Postoperative changes of the included upper thoracic spine.    I also personally reviewed the images from the above studies myself. These images and my interpretation of them contributed to the management and decision making of the patient's medical plan.    ASSESSMENT:  Ms. Rita Sabillon is a 53 y.o. female with neck pain that is consistent with:    1. Cervicalgia    2. Chronic pain syndrome    3. Long-term current use of opiate analgesic          PLAN:  Radiology: I reviewed her CT cervical spine from 2016 which shows DDD at C5-C6 with no significant canal stenosis seen. Mild cervical spondylosis resulting in mild narrowing of left-sided neural foramina at C5-C6 and C6-C7. Postoperative changes of the included upper thoracic spine. No new diagnostics at this time    Physically: No changes at this time    Psychologically: No needs at this time    Medication: No changes to medication made today. Refills for Percocet and Belbuca provided today. Last oral toxicology appropriate    Duration: Multiple years    Intervention: No plans for intervention.    I will refill the patient's opioids today for 2 month.  The patient continues to see benefit and improvement in their quality of life and ability to maintain  ADLs.  Patient educated about the risks of taking opioids and operating a motor vehicle.  Patient reports no adverse side effects to current medication regimen.  Current regimen does allow patient to maintain ADLs.  Patient reports no new neurologic symptoms, new pain areas, or exacerbation in pain today.  Patient reports they are happy with current treatment care path.    OARRS was reviewed and was consistent with the history.    Patient has been educated on the risks, benefits, and alternatives of controlled substances as well as the proper way to store these medications.  The patient and I discussed the nature of this medication and its side effects.  We discussed tolerance, physical dependence, psychological dependence, addiction and opioid-induced hyperalgesia.  We discussed the potential need to wean from this medication.  We discussed the availability of programs that can help with this process if necessary.  We discussed safety issues related to opioids including safe storage.  We discussed the fact that the patient should not drive an automobile or operate heavy machinery while taking this medication.  A prescription for naloxone was offered to the patient.  The patient will be re-evaluated for the need to continue opioid therapy in 60-90 days.  A Pill Count Was Complete today and was consistent with prescriptions filled.  Most recent Toxicology reviewed and appropriate      Sincerely,  Sunil Morrow MD  [facility] - [place]

## 2024-01-09 ENCOUNTER — APPOINTMENT (OUTPATIENT)
Dept: PAIN MEDICINE | Facility: CLINIC | Age: 54
End: 2024-01-09
Payer: COMMERCIAL

## 2024-01-09 ENCOUNTER — OFFICE VISIT (OUTPATIENT)
Dept: PAIN MEDICINE | Facility: CLINIC | Age: 54
End: 2024-01-09
Payer: COMMERCIAL

## 2024-01-09 VITALS
RESPIRATION RATE: 16 BRPM | WEIGHT: 160 LBS | DIASTOLIC BLOOD PRESSURE: 71 MMHG | HEIGHT: 57 IN | SYSTOLIC BLOOD PRESSURE: 143 MMHG | HEART RATE: 94 BPM | BODY MASS INDEX: 34.52 KG/M2

## 2024-01-09 DIAGNOSIS — Z79.891 LONG-TERM CURRENT USE OF OPIATE ANALGESIC: ICD-10-CM

## 2024-01-09 DIAGNOSIS — G89.4 CHRONIC PAIN SYNDROME: ICD-10-CM

## 2024-01-09 DIAGNOSIS — M54.2 CERVICALGIA: Primary | ICD-10-CM

## 2024-01-09 PROCEDURE — 99214 OFFICE O/P EST MOD 30 MIN: CPT | Performed by: STUDENT IN AN ORGANIZED HEALTH CARE EDUCATION/TRAINING PROGRAM

## 2024-01-09 PROCEDURE — 1036F TOBACCO NON-USER: CPT | Performed by: STUDENT IN AN ORGANIZED HEALTH CARE EDUCATION/TRAINING PROGRAM

## 2024-01-09 RX ORDER — BUPRENORPHINE HCL 150 MCG
150 FILM, MEDICATED (EA) BUCCAL EVERY 12 HOURS SCHEDULED
Qty: 60 EACH | Refills: 1 | Status: SHIPPED | OUTPATIENT
Start: 2024-01-09 | End: 2024-03-13

## 2024-01-09 RX ORDER — OXYCODONE AND ACETAMINOPHEN 7.5; 325 MG/1; MG/1
1 TABLET ORAL 2 TIMES DAILY PRN
Qty: 60 TABLET | Refills: 0 | Status: SHIPPED | OUTPATIENT
Start: 2024-01-16 | End: 2024-02-15

## 2024-01-09 RX ORDER — OXYCODONE AND ACETAMINOPHEN 7.5; 325 MG/1; MG/1
1 TABLET ORAL 2 TIMES DAILY PRN
Qty: 60 TABLET | Refills: 0 | Status: SHIPPED | OUTPATIENT
Start: 2024-02-15 | End: 2024-03-13 | Stop reason: SDUPTHER

## 2024-01-09 ASSESSMENT — PAIN - FUNCTIONAL ASSESSMENT: PAIN_FUNCTIONAL_ASSESSMENT: 0-10

## 2024-01-09 ASSESSMENT — PATIENT HEALTH QUESTIONNAIRE - PHQ9
10. IF YOU CHECKED OFF ANY PROBLEMS, HOW DIFFICULT HAVE THESE PROBLEMS MADE IT FOR YOU TO DO YOUR WORK, TAKE CARE OF THINGS AT HOME, OR GET ALONG WITH OTHER PEOPLE: SOMEWHAT DIFFICULT
2. FEELING DOWN, DEPRESSED OR HOPELESS: SEVERAL DAYS
SUM OF ALL RESPONSES TO PHQ9 QUESTIONS 1 AND 2: 2
1. LITTLE INTEREST OR PLEASURE IN DOING THINGS: SEVERAL DAYS

## 2024-01-09 ASSESSMENT — PAIN SCALES - GENERAL
PAINLEVEL_OUTOF10: 7
PAINLEVEL: 7

## 2024-01-09 ASSESSMENT — PAIN DESCRIPTION - DESCRIPTORS: DESCRIPTORS: ACHING

## 2024-01-09 NOTE — PROGRESS NOTES
MEDICATION NAME: Percocet  STRENGTH: 7.5/325mg  LAST FILL DATE: 23  DATE LAST TAKEN: 24  QUANTITY FILLED: 60  QUANTITY REMAININ  COUNT COMPLETED BY: ANA DICKINSON RN and SONNY Lee      UDS LAST COMPLETED:   CONTROLLED SUBSTANCES AGREEMENT LAST SIGNED:   ORT LAST COMPLETED:  Modified [employer] disability form filled out annually.    MEDICATION NAME:  Belcuca  STRENGTH: 150mcg  LAST FILL DATE: 23  DATE LAST TAKEN: 24  QUANTITY FILLED: 60  QUANTITY REMAININ  COUNT COMPLETED BY: ANA DICKINSON RN and SONNY Lee      UDS LAST COMPLETED:   CONTROLLED SUBSTANCES AGREEMENT LAST SIGNED:   ORT LAST COMPLETED:  Modified [employer] disability form filled out annually.

## 2024-01-25 DIAGNOSIS — E55.9 VITAMIN D DEFICIENCY: ICD-10-CM

## 2024-01-25 DIAGNOSIS — J45.20 MILD INTERMITTENT ASTHMA, UNSPECIFIED WHETHER COMPLICATED (HHS-HCC): ICD-10-CM

## 2024-01-25 DIAGNOSIS — B37.2 YEAST DERMATITIS: ICD-10-CM

## 2024-01-25 DIAGNOSIS — N32.89 BLADDER SPASMS: Primary | ICD-10-CM

## 2024-01-25 RX ORDER — ERGOCALCIFEROL 1.25 MG/1
1 CAPSULE ORAL
Qty: 4 CAPSULE | Refills: 10 | Status: SHIPPED | OUTPATIENT
Start: 2024-01-25

## 2024-01-25 RX ORDER — FLUTICASONE PROPIONATE 50 MCG
SPRAY, SUSPENSION (ML) NASAL
Qty: 16 G | Refills: 10 | Status: SHIPPED | OUTPATIENT
Start: 2024-01-25

## 2024-01-25 RX ORDER — NYSTATIN 100000 [USP'U]/G
POWDER TOPICAL
Qty: 120 G | Refills: 10 | Status: SHIPPED | OUTPATIENT
Start: 2024-01-25

## 2024-01-25 RX ORDER — NITROFURANTOIN (MACROCRYSTALS) 100 MG/1
100 CAPSULE ORAL DAILY
Qty: 28 CAPSULE | Refills: 10 | Status: SHIPPED | OUTPATIENT
Start: 2024-01-25 | End: 2024-03-28 | Stop reason: ALTCHOICE

## 2024-01-31 ENCOUNTER — APPOINTMENT (OUTPATIENT)
Dept: RADIOLOGY | Facility: HOSPITAL | Age: 54
End: 2024-01-31
Payer: COMMERCIAL

## 2024-01-31 ENCOUNTER — HOSPITAL ENCOUNTER (EMERGENCY)
Facility: HOSPITAL | Age: 54
Discharge: HOME | End: 2024-01-31
Attending: EMERGENCY MEDICINE
Payer: COMMERCIAL

## 2024-01-31 VITALS
WEIGHT: 168.7 LBS | DIASTOLIC BLOOD PRESSURE: 95 MMHG | HEART RATE: 100 BPM | OXYGEN SATURATION: 94 % | RESPIRATION RATE: 19 BRPM | SYSTOLIC BLOOD PRESSURE: 137 MMHG | BODY MASS INDEX: 36.39 KG/M2 | TEMPERATURE: 99 F | HEIGHT: 57 IN

## 2024-01-31 DIAGNOSIS — M25.571 ACUTE RIGHT ANKLE PAIN: Primary | ICD-10-CM

## 2024-01-31 DIAGNOSIS — I10 DIASTOLIC HYPERTENSION: ICD-10-CM

## 2024-01-31 PROCEDURE — 73610 X-RAY EXAM OF ANKLE: CPT | Mod: RT

## 2024-01-31 PROCEDURE — 2500000001 HC RX 250 WO HCPCS SELF ADMINISTERED DRUGS (ALT 637 FOR MEDICARE OP): Performed by: EMERGENCY MEDICINE

## 2024-01-31 PROCEDURE — 73630 X-RAY EXAM OF FOOT: CPT | Mod: RT

## 2024-01-31 PROCEDURE — 99284 EMERGENCY DEPT VISIT MOD MDM: CPT | Performed by: EMERGENCY MEDICINE

## 2024-01-31 RX ORDER — IBUPROFEN 400 MG/1
400 TABLET ORAL EVERY 8 HOURS PRN
Qty: 15 TABLET | Refills: 0 | Status: SHIPPED | OUTPATIENT
Start: 2024-01-31 | End: 2024-02-05

## 2024-01-31 RX ORDER — IBUPROFEN 600 MG/1
600 TABLET ORAL ONCE
Status: COMPLETED | OUTPATIENT
Start: 2024-01-31 | End: 2024-01-31

## 2024-01-31 RX ORDER — ACETAMINOPHEN 325 MG/1
650 TABLET ORAL ONCE
Status: COMPLETED | OUTPATIENT
Start: 2024-01-31 | End: 2024-01-31

## 2024-01-31 RX ADMIN — ACETAMINOPHEN 650 MG: 325 TABLET ORAL at 16:51

## 2024-01-31 RX ADMIN — IBUPROFEN 600 MG: 600 TABLET, FILM COATED ORAL at 16:51

## 2024-01-31 ASSESSMENT — COLUMBIA-SUICIDE SEVERITY RATING SCALE - C-SSRS
1. IN THE PAST MONTH, HAVE YOU WISHED YOU WERE DEAD OR WISHED YOU COULD GO TO SLEEP AND NOT WAKE UP?: NO
2. HAVE YOU ACTUALLY HAD ANY THOUGHTS OF KILLING YOURSELF?: NO
6. HAVE YOU EVER DONE ANYTHING, STARTED TO DO ANYTHING, OR PREPARED TO DO ANYTHING TO END YOUR LIFE?: NO

## 2024-01-31 ASSESSMENT — PAIN - FUNCTIONAL ASSESSMENT
PAIN_FUNCTIONAL_ASSESSMENT: 0-10
PAIN_FUNCTIONAL_ASSESSMENT: 0-10

## 2024-01-31 ASSESSMENT — PAIN DESCRIPTION - PROGRESSION: CLINICAL_PROGRESSION: NOT CHANGED

## 2024-01-31 ASSESSMENT — PAIN DESCRIPTION - LOCATION: LOCATION: ANKLE

## 2024-01-31 ASSESSMENT — PAIN DESCRIPTION - PAIN TYPE: TYPE: OTHER (COMMENT)

## 2024-01-31 ASSESSMENT — PAIN DESCRIPTION - ONSET: ONSET: ONGOING

## 2024-01-31 ASSESSMENT — PAIN SCALES - GENERAL
PAINLEVEL_OUTOF10: 5 - MODERATE PAIN
PAINLEVEL_OUTOF10: 7

## 2024-01-31 ASSESSMENT — PAIN DESCRIPTION - FREQUENCY: FREQUENCY: CONSTANT/CONTINUOUS

## 2024-01-31 NOTE — DISCHARGE INSTRUCTIONS
Follow-up with your primary care physician within 1 to 2 days for further management of your current symptoms and repeat check of your blood pressure.      Follow-up with podiatry for further management of your ankle/foot injury.      Return to the emergency department sooner with worsening of symptoms or onset of new symptoms

## 2024-01-31 NOTE — ED PROVIDER NOTES
HPI   Chief Complaint   Patient presents with    Ankle Pain     Patient states she hit her ankle on the bus in her wheelchair on Sunday.        HPI                    Mays Coma Scale Score: 15                  Patient History   Past Medical History:   Diagnosis Date    Abnormal urine sediment 06/11/2019    Asthma     Cellulitis of right leg 08/01/2019    Cerebral palsy (CMS/HCC)     Conjunctivitis 12/05/2023    Dyspnea 12/05/2023    Blanco catheter present 12/12/2020    Jaskaran hematuria 12/05/2023    Hemorrhagic cystitis 10/14/2021    Left ankle sprain 02/04/2020    Low back pain, unspecified 12/05/2023    Neuropathy     HANDS AND FEET    Osteoarthritis     Urinary pain 03/09/2020    Urinary tract infection 09/17/2020     Past Surgical History:   Procedure Laterality Date    OTHER SURGICAL HISTORY  07/13/2022    Back surgery    OTHER SURGICAL HISTORY  07/13/2022    Gallbladder surgery    OTHER SURGICAL HISTORY  07/20/2022    Suprapubic catheter insertion     No family history on file.  Social History     Tobacco Use    Smoking status: Never    Smokeless tobacco: Never   Substance Use Topics    Alcohol use: Never    Drug use: Never       Physical Exam   ED Triage Vitals [01/31/24 1547]   Temperature Heart Rate Respirations BP   37.3 °C (99.1 °F) (!) 111 20 (!) 146/101      Pulse Ox Temp Source Heart Rate Source Patient Position   94 % Oral Monitor Sitting      BP Location FiO2 (%)     Right arm --       Physical Exam    ED Course & MDM   Diagnoses as of 01/31/24 2238   Acute right ankle pain   Diastolic hypertension       Medical Decision Making    The patient is a 53-year-old female presenting to the emergency department by EMS from home for evaluation of right ankle and foot pain.  The patient reportedly does have chronic disability and is wheelchair dependent.  She states that she was sitting in her wheelchair when she was getting off the bus 5 days ago she injured her right ankle.  She states that he has had  persistent pain since then so she came to the emergency room today to get an x-ray.  She is nonweightbearing at all times.  This is a chronic issue.  No head injury or loss of consciousness.  No neck or back pain.  No chest pain or shortness of breath.  No abdominal pain.  No nausea or vomiting.  No diarrhea or constipation but no urinary complaints.  All pertinent positives and negatives are recorded above.  All other systems reviewed and otherwise negative.  Vital signs with hypertension and mild tachycardia but otherwise within normal limits.  Physical exam with a well-nourished well-developed female in no acute distress.  HEENT exam within normal limits.  She has no evidence of airway compromise or respiratory distress.  Abdominal exam is benign.  She has no gross motor, neurologic or vascular deficits on exam but she does have chronic deformities of both of her lower extremities.  She has no visible or palpable bony deformity.  No warmth, erythema or edema of her right foot or ankle.      Oral ibuprofen and oral acetaminophen ordered.    XR ankle right 3+ views   Final Result   No acute osseous abnormality..        MACRO:   None.        Signed by: Irais Head 1/31/2024 4:59 PM   Dictation workstation:   [id number]      XR foot right 3+ views   Final Result   Questionable nondisplaced fracture deformities at the distal   metatarsal head the 3rd through 5th digits. Clinical correlation and   correlation with site of pain is recommended.        MACRO:   None.        Signed by: Irais Head 1/31/2024 5:01 PM   Dictation workstation:   [id number]           The patient does not have obvious fracture on exam but there is questionable nondisplaced fractures/deformities of the right foot at the distal metatarsal head of the 3rd-5th digits.  This does not correlate to the majority of her pain which is in the ankle but the patient was placed in a postop shoe as a precaution.  The postop shoe was applied by  nursing staff.  The patient was neurovascularly intact after splint application.      The patient was released in good condition.  She was given a prescription for ibuprofen.  She was instructed to follow-up with her primary care physician within 1 to 2 days for further management of her current symptoms and repeat check of her blood pressure.  She was also given a referral to orthopedics/podiatry for further management of her foot/ankle pain.  She will return to the emergency department sooner with worsening of symptoms or onset of new symptoms.      Impression/diagnosis  Right ankle/foot contusion  Hypertension, unspecified      I reviewed the results of the diagnostic imaging.  Formal radiology reading was completed by the radiologist.    Procedure  Procedures     Kerry Hein MD  01/31/24 0641       Kerry Hein MD  01/31/24 5145

## 2024-02-20 ENCOUNTER — TELEPHONE (OUTPATIENT)
Dept: PAIN MEDICINE | Facility: CLINIC | Age: 54
End: 2024-02-20
Payer: COMMERCIAL

## 2024-02-20 NOTE — TELEPHONE ENCOUNTER
Patient called to let you know she received a letter from her insurance company stating they will no longer cover Belbuca.

## 2024-02-27 DIAGNOSIS — J45.20 MILD INTERMITTENT ASTHMA, UNSPECIFIED WHETHER COMPLICATED (HHS-HCC): ICD-10-CM

## 2024-02-27 RX ORDER — FLUTICASONE FUROATE 200 UG/1
POWDER RESPIRATORY (INHALATION)
Qty: 30 EACH | Refills: 0 | Status: SHIPPED | OUTPATIENT
Start: 2024-02-27 | End: 2024-04-22

## 2024-02-27 RX ORDER — CETIRIZINE HYDROCHLORIDE 10 MG/1
10 TABLET ORAL DAILY
Qty: 28 TABLET | Refills: 0 | Status: SHIPPED | OUTPATIENT
Start: 2024-02-27 | End: 2024-04-22

## 2024-02-27 NOTE — TELEPHONE ENCOUNTER
She is on a fairly low dose of Belbuca. I would recommend she start tapering this down by decreasing to 1 film once a day and then once she runs out of medication to stop. If her pain worsens after she stops the medication we can discuss additional options. Thanks

## 2024-03-08 ENCOUNTER — APPOINTMENT (OUTPATIENT)
Dept: PRIMARY CARE | Facility: CLINIC | Age: 54
End: 2024-03-08
Payer: COMMERCIAL

## 2024-03-12 ENCOUNTER — APPOINTMENT (OUTPATIENT)
Dept: PAIN MEDICINE | Facility: CLINIC | Age: 54
End: 2024-03-12
Payer: COMMERCIAL

## 2024-03-13 ENCOUNTER — OFFICE VISIT (OUTPATIENT)
Dept: PAIN MEDICINE | Facility: CLINIC | Age: 54
End: 2024-03-13
Payer: COMMERCIAL

## 2024-03-13 VITALS
WEIGHT: 168 LBS | DIASTOLIC BLOOD PRESSURE: 88 MMHG | BODY MASS INDEX: 36.24 KG/M2 | OXYGEN SATURATION: 95 % | HEIGHT: 57 IN | SYSTOLIC BLOOD PRESSURE: 138 MMHG | HEART RATE: 119 BPM

## 2024-03-13 DIAGNOSIS — M54.12 RADICULOPATHY OF CERVICAL REGION: ICD-10-CM

## 2024-03-13 DIAGNOSIS — G89.4 CHRONIC PAIN SYNDROME: ICD-10-CM

## 2024-03-13 DIAGNOSIS — M48.02 SPINAL STENOSIS IN CERVICAL REGION: Primary | ICD-10-CM

## 2024-03-13 DIAGNOSIS — Z79.891 LONG TERM CURRENT USE OF OPIATE ANALGESIC: ICD-10-CM

## 2024-03-13 PROCEDURE — 99214 OFFICE O/P EST MOD 30 MIN: CPT | Performed by: NURSE PRACTITIONER

## 2024-03-13 PROCEDURE — 1036F TOBACCO NON-USER: CPT | Performed by: NURSE PRACTITIONER

## 2024-03-13 RX ORDER — OXYCODONE AND ACETAMINOPHEN 7.5; 325 MG/1; MG/1
1 TABLET ORAL EVERY 8 HOURS PRN
Qty: 90 TABLET | Refills: 0 | Status: SHIPPED | OUTPATIENT
Start: 2024-03-13 | End: 2024-04-10 | Stop reason: SDUPTHER

## 2024-03-13 RX ORDER — BUPRENORPHINE 5 UG/H
1 PATCH TRANSDERMAL
Qty: 4 PATCH | Refills: 0 | Status: SHIPPED | OUTPATIENT
Start: 2024-03-13 | End: 2024-04-10 | Stop reason: SDUPTHER

## 2024-03-13 ASSESSMENT — PAIN - FUNCTIONAL ASSESSMENT: PAIN_FUNCTIONAL_ASSESSMENT: 0-10

## 2024-03-13 ASSESSMENT — PAIN DESCRIPTION - DESCRIPTORS: DESCRIPTORS: ACHING;SHARP

## 2024-03-13 ASSESSMENT — PAIN SCALES - GENERAL
PAINLEVEL_OUTOF10: 8
PAINLEVEL: 8

## 2024-03-13 ASSESSMENT — PATIENT HEALTH QUESTIONNAIRE - PHQ9: 2. FEELING DOWN, DEPRESSED OR HOPELESS: NOT AT ALL

## 2024-03-13 NOTE — PROGRESS NOTES
MEDICATION NAME: Oxycodone  STRENGTH: 7.5-325mg  LAST FILL DATE: 24  DATE LAST TAKEN: 3/13/24  QUANTITY FILLED: 60  QUANTITY REMAININ  COUNT COMPLETED BY: JOHNNY AVINA MA and ANA LUISA BERGMAN      UDS LAST COMPLETED:   CONTROLLED SUBSTANCES AGREEMENT LAST SIGNED:   ORT LAST COMPLETED:  Modified [employer] disability form filled out annually.

## 2024-03-13 NOTE — PROGRESS NOTES
Subjective   Patient ID: Rita Sabillon is a 53 y.o. female who presents for Pain Management.    HPI 54 YO Female with a PMHx significant for cerebral palsy, asthma, chronic neck pain, muscle spasms cervicalgia and chronic pain syndrome. She presents for a pain management follow-up and medication refill.  Today she reports no changes to her pain since her last visit with Dr. Morrow.  Her current pain regimen has consisted of Percocet 7.5-325 mg every 12 hours as needed and Belbuca 150 mcg every 12 hours.  In addition she takes baclofen 20 mg 3 times daily and cyclobenzaprine 5 mg every 8 hours as needed.  She also takes diclofenac 50 mg twice daily and gabapentin 600 mg 3 times daily.  Of note, Rita called the office recently stating that Belbuca was no longer covered by her insurance policy.  Dr. Morrow recommended she begin to taper off of it.  He informed the patient that if her pain worsened due to stopping the Belbuca he would discuss with her further options for medication management. Rita reports she is completed titrated off the Belbuca and is only taking her Percocet as directed, BID. She reports her pain is not well-controlled at this time. She reports she stays between a 7-8/10. She would like to see if there are any adjustments we can make to her medication regimen to provide additional pain relief.     Review of Systems Unless noted in the HPI all other systems have been reviewed and are negative for complaint.     Objective   Physical Exam  General- No acute distress, well appearing and well nourished. Obese  Eyes Conjunctiva and lids: No erythema, swelling or discharge  Neck - Supple, no cervical lymphadenopathy.   Pulmonary - Respiratory effort: Normal respiration.   Cardiovascular - Normal rate and rhythm.  Examination of extremities for edema and/or varicosities: No peripheral edema  Abdomen: Soft, Non-tender, non-distended, no abdominal masses.   Musculoskeletal - spine with reduced ROM;  especially cervical area. Bilateral shoulders with significantly reduced ROM. BLE with atrophy noted.   Skin - Skin and subcutaneous tissue: Normal without rashes or lesions.  Neurologic - Abnormal gait; use of motorized wheelchair for mobility. A&O x3.   Psychiatric - Orientation to person, place, and time: Normal. Mood and affect: Normal.    Assessment/Plan   Problem List Items Addressed This Visit       Chronic pain syndrome    Relevant Medications    oxyCODONE-acetaminophen (Percocet) 7.5-325 mg tablet    Spinal stenosis in cervical region - Primary    Relevant Medications    buprenorphine (Butrans) 5 mcg/hour patch    Radiculopathy of cervical region     Other Visit Diagnoses       Long term current use of opiate analgesic        Relevant Orders    Oral drug testing; [facility]; 409689 - Miscellaneous Test          TREATMENT PLAN:  I had a nice discussion with the patient today and our plan will be as follows:  Radiology: No new imaging to review at this time.   Physically: Encouraged patient to continue with increased physical activity as able.   Psychologically: No acute psychological needs at this time.    Medication: Discussed medications with my collaborating physician, Dr. Sunil Morrow, and we decided to increase her Percocet 7.5-325 from BID to TID for this next month. We are looking into her insurance to see if we can get Butrans approved. I have sent in an RX for Butrans 5mcg weekly patch. If we can get this approved through insurance, we can work on up-titrating as needed. This will be a better long-term option as it has the ability to control her pain better with less peaks and valleys. I will refill the patient's opioids today for [ 1 ] month.  The patient continues to see benefit and improvement in their quality of life and ability to maintain ADLs. Patient educated about the risks of taking opioids and operating a motor vehicle. Patient reports no adverse side effects to current medication  regimen.  Current regimen does allow patient to maintain ADLs.  Patient reports no new neurologic symptoms, new pain areas, or exacerbation in pain today.  Patient reports they are happy with current treatment care path. Patient has been educated on the risks, benefits, and alternatives of controlled substances as well as the proper way to store these medications. The patient and I discussed the nature of this medication and its side effects.  We discussed tolerance, physical dependence, psychological dependence, addiction and opioid-induced hyperalgesia.  We discussed the potential need to wean from this medication.  We discussed the availability of programs that can help with this process if necessary.  We discussed safety issues related to opioids including safe storage.  We discussed the fact that the patient should not drive an automobile or operate heavy machinery while taking this medication.  A prescription for naloxone was offered to the patient.  The patient will be re-evaluated for the need to continue opioid therapy in 60-90 days.  A PDMP report was reviewed today and was consistent with reported prescribing.  Toxicology screening is due; will obtain specimen today.  Duration: Chronic/ongoing.  Intervention: Adjusted medications. Will follow-up in 1 month to reassess.

## 2024-03-14 ENCOUNTER — APPOINTMENT (OUTPATIENT)
Dept: PRIMARY CARE | Facility: CLINIC | Age: 54
End: 2024-03-14
Payer: COMMERCIAL

## 2024-03-20 ENCOUNTER — TELEPHONE (OUTPATIENT)
Dept: PRIMARY CARE | Facility: CLINIC | Age: 54
End: 2024-03-20

## 2024-03-20 NOTE — TELEPHONE ENCOUNTER
Called out to check on patient since she missed appointment.  She advised she called to cancel her appointment.  She said she is doing fine, but couldn't get a caregiver to get there early enough to help her.  She requested to reschedule.  Transferred her to reception.

## 2024-03-21 ENCOUNTER — TELEPHONE (OUTPATIENT)
Dept: PRIMARY CARE | Facility: CLINIC | Age: 54
End: 2024-03-21
Payer: COMMERCIAL

## 2024-03-21 DIAGNOSIS — M25.561 RIGHT KNEE PAIN, UNSPECIFIED CHRONICITY: ICD-10-CM

## 2024-03-21 LAB — SCAN RESULT: NORMAL

## 2024-03-21 NOTE — TELEPHONE ENCOUNTER
PT has a CPE scheduled on 03-28-24 and she's requesting to have an X-Ray of her right knee.    PT can be reached at [phone].

## 2024-03-26 DIAGNOSIS — G43.009 MIGRAINE WITHOUT AURA, NOT INTRACTABLE, WITHOUT STATUS MIGRAINOSUS: Primary | ICD-10-CM

## 2024-03-26 RX ORDER — NARATRIPTAN 2.5 MG/1
TABLET ORAL
Qty: 9 TABLET | Refills: 10 | Status: SHIPPED | OUTPATIENT
Start: 2024-03-26

## 2024-03-28 ENCOUNTER — OFFICE VISIT (OUTPATIENT)
Dept: PRIMARY CARE | Facility: CLINIC | Age: 54
End: 2024-03-28
Payer: COMMERCIAL

## 2024-03-28 VITALS
DIASTOLIC BLOOD PRESSURE: 90 MMHG | SYSTOLIC BLOOD PRESSURE: 150 MMHG | TEMPERATURE: 97.6 F | HEART RATE: 96 BPM | OXYGEN SATURATION: 98 %

## 2024-03-28 DIAGNOSIS — M25.561 RIGHT KNEE PAIN, UNSPECIFIED CHRONICITY: ICD-10-CM

## 2024-03-28 DIAGNOSIS — J45.20 MILD INTERMITTENT ASTHMA WITHOUT COMPLICATION (HHS-HCC): ICD-10-CM

## 2024-03-28 DIAGNOSIS — G80.0 SPASTIC QUADRIPARESIS SECONDARY TO CEREBRAL PALSY (MULTI): Primary | ICD-10-CM

## 2024-03-28 DIAGNOSIS — K21.9 GASTROESOPHAGEAL REFLUX DISEASE WITHOUT ESOPHAGITIS: ICD-10-CM

## 2024-03-28 DIAGNOSIS — M48.02 SPINAL STENOSIS IN CERVICAL REGION: ICD-10-CM

## 2024-03-28 DIAGNOSIS — Z12.31 BREAST CANCER SCREENING BY MAMMOGRAM: ICD-10-CM

## 2024-03-28 DIAGNOSIS — E78.49 OTHER HYPERLIPIDEMIA: ICD-10-CM

## 2024-03-28 DIAGNOSIS — Z00.00 ROUTINE GENERAL MEDICAL EXAMINATION AT A HEALTH CARE FACILITY: ICD-10-CM

## 2024-03-28 PROCEDURE — 99396 PREV VISIT EST AGE 40-64: CPT | Performed by: PHYSICIAN ASSISTANT

## 2024-03-28 RX ORDER — SILVER SULFADIAZINE 10 G/1000G
CREAM TOPICAL
COMMUNITY
Start: 2024-03-04

## 2024-03-28 RX ORDER — POLYETHYLENE GLYCOL 3350 17 G/17G
POWDER, FOR SOLUTION ORAL
COMMUNITY
Start: 2024-03-04

## 2024-03-28 ASSESSMENT — PATIENT HEALTH QUESTIONNAIRE - PHQ9
SUM OF ALL RESPONSES TO PHQ9 QUESTIONS 1 AND 2: 0
2. FEELING DOWN, DEPRESSED OR HOPELESS: NOT AT ALL
1. LITTLE INTEREST OR PLEASURE IN DOING THINGS: NOT AT ALL

## 2024-03-28 ASSESSMENT — PAIN SCALES - GENERAL: PAINLEVEL: 6

## 2024-03-28 NOTE — PROGRESS NOTES
Subjective   Patient ID: Rita Sabillon is a 53 y.o. female who presents for Annual Exam.    HPI  Pt's PMH, FH and SH updated. She is working and doing so much better. She has a different group taking care of her at home and she is feeling great.  Review of Systems   Constitutional:  Negative for activity change and appetite change.   HENT:  Negative for dental problem.    Eyes:  Negative for visual disturbance.   Respiratory:  Negative for chest tightness, shortness of breath and wheezing.    Cardiovascular:  Negative for chest pain, palpitations and leg swelling.   Gastrointestinal:  Negative for abdominal pain, blood in stool, constipation, diarrhea and nausea.   Endocrine: Negative for cold intolerance and heat intolerance.   Genitourinary:  Negative for frequency and urgency.   Musculoskeletal:  Positive for arthralgias, back pain, myalgias and neck pain.   Skin:  Negative for color change.   Allergic/Immunologic: Negative for immunocompromised state.   Neurological:  Negative for dizziness, tremors and light-headedness.   Psychiatric/Behavioral:  Negative for behavioral problems and sleep disturbance. The patient is not nervous/anxious.        Objective   /90 (BP Location: Left arm)   Pulse 96   Temp 36.4 °C (97.6 °F) (Temporal)   SpO2 98%     Physical Exam  Constitutional:       Comments: Has spina bifida. In motorized wheel chair.   HENT:      Right Ear: Tympanic membrane normal.      Left Ear: Tympanic membrane normal.      Nose: Nose normal.      Mouth/Throat:      Mouth: Mucous membranes are moist.   Cardiovascular:      Rate and Rhythm: Normal rate and regular rhythm.      Pulses: Normal pulses.   Pulmonary:      Effort: Pulmonary effort is normal. No respiratory distress.   Abdominal:      General: Bowel sounds are normal.      Tenderness: There is no abdominal tenderness.   Musculoskeletal:      Cervical back: Normal range of motion. No tenderness.      Right lower leg: No edema.      Left  lower leg: No edema.   Skin:     General: Skin is warm.   Neurological:      General: No focal deficit present.      Mental Status: She is alert. Mental status is at baseline.   Psychiatric:         Mood and Affect: Mood normal.         Thought Content: Thought content normal.         Judgment: Judgment normal.         Assessment/Plan   Diagnoses and all orders for this visit:  Spastic quadriparesis secondary to cerebral palsy (CMS/HCC)  Breast cancer screening by mammogram  -     BI mammo bilateral screening tomosynthesis; Future  Spinal stenosis in cervical region  Right knee pain, unspecified chronicity  Routine general medical examination at a health care facility  -     CBC and Auto Differential; Future  -     Comprehensive Metabolic Panel; Future  -     Lipid Panel; Future  -     TSH with reflex to Free T4 if abnormal; Future  Gastroesophageal reflux disease without esophagitis  -     CBC and Auto Differential; Future  -     Comprehensive Metabolic Panel; Future  Mild intermittent asthma without complication  Other hyperlipidemia  -     Lipid Panel; Future  Other orders  -     Follow Up In Primary Care - Established; Future    She sees many specialists for all of her health issues she is UTD on them and continue her current medication regimen.

## 2024-03-31 PROBLEM — G89.29 CHRONIC PAIN: Status: ACTIVE | Noted: 2018-10-30

## 2024-03-31 PROBLEM — L03.119 CELLULITIS OF LOWER LIMB: Status: ACTIVE | Noted: 2019-08-01

## 2024-03-31 PROBLEM — Q05.9 SPINA BIFIDA (MULTI): Status: ACTIVE | Noted: 2023-09-12

## 2024-03-31 ASSESSMENT — ENCOUNTER SYMPTOMS
CHEST TIGHTNESS: 0
NAUSEA: 0
BLOOD IN STOOL: 0
WHEEZING: 0
DIZZINESS: 0
ARTHRALGIAS: 1
TREMORS: 0
BACK PAIN: 1
APPETITE CHANGE: 0
FREQUENCY: 0
COLOR CHANGE: 0
MYALGIAS: 1
LIGHT-HEADEDNESS: 0
DIARRHEA: 0
PALPITATIONS: 0
ACTIVITY CHANGE: 0
CONSTIPATION: 0
NECK PAIN: 1
NERVOUS/ANXIOUS: 0
ABDOMINAL PAIN: 0
SLEEP DISTURBANCE: 0
SHORTNESS OF BREATH: 0

## 2024-04-09 DIAGNOSIS — M48.02 SPINAL STENOSIS IN CERVICAL REGION: ICD-10-CM

## 2024-04-09 DIAGNOSIS — G89.4 CHRONIC PAIN SYNDROME: ICD-10-CM

## 2024-04-09 DIAGNOSIS — M54.12 RADICULOPATHY OF CERVICAL REGION: ICD-10-CM

## 2024-04-10 ENCOUNTER — OFFICE VISIT (OUTPATIENT)
Dept: PAIN MEDICINE | Facility: CLINIC | Age: 54
End: 2024-04-10
Payer: COMMERCIAL

## 2024-04-10 DIAGNOSIS — G89.4 CHRONIC PAIN SYNDROME: ICD-10-CM

## 2024-04-10 DIAGNOSIS — M54.12 RADICULOPATHY OF CERVICAL REGION: Primary | ICD-10-CM

## 2024-04-10 DIAGNOSIS — M48.02 SPINAL STENOSIS IN CERVICAL REGION: ICD-10-CM

## 2024-04-10 PROCEDURE — 99214 OFFICE O/P EST MOD 30 MIN: CPT | Performed by: NURSE PRACTITIONER

## 2024-04-10 RX ORDER — BUPRENORPHINE 5 UG/H
PATCH TRANSDERMAL
Qty: 4 PATCH | Refills: 0 | OUTPATIENT
Start: 2024-04-10

## 2024-04-10 RX ORDER — OXYCODONE AND ACETAMINOPHEN 7.5; 325 MG/1; MG/1
1 TABLET ORAL EVERY 8 HOURS PRN
Qty: 90 TABLET | Refills: 0 | Status: SHIPPED | OUTPATIENT
Start: 2024-05-12 | End: 2024-04-10 | Stop reason: RX

## 2024-04-10 RX ORDER — OXYCODONE AND ACETAMINOPHEN 7.5; 325 MG/1; MG/1
1 TABLET ORAL EVERY 6 HOURS PRN
Qty: 90 TABLET | Refills: 0 | Status: CANCELLED | OUTPATIENT
Start: 2024-04-12 | End: 2024-05-12

## 2024-04-10 RX ORDER — OXYCODONE AND ACETAMINOPHEN 7.5; 325 MG/1; MG/1
1 TABLET ORAL EVERY 6 HOURS PRN
Qty: 90 TABLET | Refills: 0 | Status: SHIPPED | OUTPATIENT
Start: 2024-04-12 | End: 2024-04-10 | Stop reason: RX

## 2024-04-10 RX ORDER — BUPRENORPHINE 5 UG/H
1 PATCH TRANSDERMAL
Qty: 4 PATCH | Refills: 1 | Status: SHIPPED | OUTPATIENT
Start: 2024-04-11 | End: 2024-05-09

## 2024-04-10 RX ORDER — BUPRENORPHINE 5 UG/H
1 PATCH TRANSDERMAL
Qty: 4 PATCH | Refills: 1 | Status: SHIPPED | OUTPATIENT
Start: 2024-04-11 | End: 2024-04-10

## 2024-04-10 RX ORDER — OXYCODONE AND ACETAMINOPHEN 7.5; 325 MG/1; MG/1
1 TABLET ORAL EVERY 8 HOURS PRN
Qty: 90 TABLET | Refills: 0 | Status: CANCELLED | OUTPATIENT
Start: 2024-05-12 | End: 2024-06-11

## 2024-04-10 RX ORDER — BUPRENORPHINE 5 UG/H
1 PATCH TRANSDERMAL
Qty: 4 PATCH | Refills: 1 | Status: CANCELLED | OUTPATIENT
Start: 2024-04-14 | End: 2024-05-12

## 2024-04-10 RX ORDER — OXYCODONE AND ACETAMINOPHEN 7.5; 325 MG/1; MG/1
1 TABLET ORAL EVERY 8 HOURS PRN
Qty: 90 TABLET | Refills: 0 | Status: SHIPPED | OUTPATIENT
Start: 2024-05-12 | End: 2024-06-11

## 2024-04-10 RX ORDER — OXYCODONE AND ACETAMINOPHEN 7.5; 325 MG/1; MG/1
1 TABLET ORAL EVERY 6 HOURS PRN
Qty: 90 TABLET | Refills: 0 | Status: SHIPPED | OUTPATIENT
Start: 2024-04-12 | End: 2024-05-12

## 2024-04-10 NOTE — PROGRESS NOTES
MEDICATION NAME: Oxycodone  STRENGTH: 7.5-325mg  LAST FILL DATE: 3/15/2024  DATE LAST TAKEN: 4/10/24  QUANTITY FILLED: 90  QUANTITY REMAININ  COUNT COMPLETED BY: JOHNNY AVINA MA and ANA LUISA BERGMAN    UDS LAST COMPLETED:   CONTROLLED SUBSTANCES AGREEMENT LAST SIGNED:   ORT LAST COMPLETED:  Modified [employer] disability form filled out annually.     MEDICATION NAME: Buprenephrine  STRENGTH: 5mcg  LAST FILL DATE: 3/14/24  DATE LAST TAKEN: 24  QUANTITY FILLED: 4  QUANTITY REMAININ  COUNT COMPLETED BY: JOHNNY AVINA MA and MADALYN NP    UDS LAST COMPLETED:   CONTROLLED SUBSTANCES AGREEMENT LAST SIGNED:   ORT LAST COMPLETED:  Modified [employer] disability form filled out annually.

## 2024-04-10 NOTE — TELEPHONE ENCOUNTER
Patient called and asked if medications can be sent to [facility].  She called previous pharmacy to have them transferred but they are controlled medications.

## 2024-04-10 NOTE — PROGRESS NOTES
Left detailed message on answering machine with test results. Pt has FYI. Subjective   Patient ID: Rita Sabillon is a 53 y.o. female who presents for Pain Management.    HPI 54 YO Female with a PMHx significant for cerebral palsy, asthma, chronic neck pain, muscle spasms cervicalgia and chronic pain syndrome. She presents for a pain management follow-up and medication refill.  Today she reports no changes to her pain since her last visit with Dr. Morrow.  Her current pain regimen has consisted of Percocet 7.5-325 mg every 12 hours as needed and Belbuca 150 mcg every 12 hours.  In addition she takes baclofen 20 mg 3 times daily and cyclobenzaprine 5 mg every 8 hours as needed.  She also takes diclofenac 50 mg twice daily and gabapentin 600 mg 3 times daily.  Of note, Rita was recently titrated off Belbuca due to insurance no longer covering and was started on Buprenorphine 5mcg/hr patch last visit. We also increased her Percocet from BID to TID while trialing the new regimen. Today she reports, that the change in her medication regimen has been significantly beneficial.  She reports that she was able to go back to work 4 days a week as well as participate in some recreational activities for enjoyment.    Review of Systems Unless noted in the HPI all other systems have been reviewed and are negative for complaint.     Objective   Physical Exam  General- No acute distress, well appearing and well nourished. Obese  Eyes Conjunctiva and lids: No erythema, swelling or discharge  Neck - Supple, no cervical lymphadenopathy.   Pulmonary - Respiratory effort: Normal respiration.   Cardiovascular - Normal rate and rhythm.  Examination of extremities for edema and/or varicosities: No peripheral edema  Abdomen: Soft, Non-tender, non-distended, no abdominal masses.   Musculoskeletal - spine with reduced ROM; especially cervical area. Bilateral shoulders with significantly reduced ROM. BLE with atrophy noted.   Skin - Skin and subcutaneous tissue: Normal without rashes or lesions.  Neurologic -  Abnormal gait; use of motorized wheelchair for mobility. A&O x3.   Psychiatric - Orientation to person, place, and time: Normal. Mood and affect: Normal.     Assessment/Plan   Problem List Items Addressed This Visit       Chronic pain    Relevant Medications    buprenorphine (Butrans) 5 mcg/hour patch (Start on 4/11/2024)    oxyCODONE-acetaminophen (Percocet) 7.5-325 mg tablet (Start on 5/12/2024)    oxyCODONE-acetaminophen (Percocet) 7.5-325 mg tablet (Start on 4/12/2024)    Spinal stenosis in cervical region    Relevant Medications    buprenorphine (Butrans) 5 mcg/hour patch (Start on 4/11/2024)    oxyCODONE-acetaminophen (Percocet) 7.5-325 mg tablet (Start on 5/12/2024)    oxyCODONE-acetaminophen (Percocet) 7.5-325 mg tablet (Start on 4/12/2024)    Radiculopathy of cervical region - Primary    Relevant Medications    buprenorphine (Butrans) 5 mcg/hour patch (Start on 4/11/2024)    oxyCODONE-acetaminophen (Percocet) 7.5-325 mg tablet (Start on 5/12/2024)    oxyCODONE-acetaminophen (Percocet) 7.5-325 mg tablet (Start on 4/12/2024)     TREATMENT PLAN:  I had a nice discussion with the patient today and our plan will be as follows:  Radiology: No new imaging to review at this time.   Physically: Encouraged patient to continue with increased physical activity as able.   Psychologically: No acute psychological needs at this time.    Medication: I will refill the patient's opioids today for [ 2 ] months.  The patient continues to see benefit and improvement in their quality of life and ability to maintain ADLs. Patient educated about the risks of taking opioids and operating a motor vehicle. Patient reports no adverse side effects to current medication regimen.  Current regimen does allow patient to maintain ADLs.  Patient reports no new neurologic symptoms, new pain areas, or exacerbation in pain today.  Patient reports they are happy with current treatment care path.    Patient has been educated on the risks,  benefits, and alternatives of controlled substances as well as the proper way to store these medications. The patient and I discussed the nature of this medication and its side effects.  We discussed tolerance, physical dependence, psychological dependence, addiction and opioid-induced hyperalgesia.  We discussed the potential need to wean from this medication.  We discussed the availability of programs that can help with this process if necessary.  We discussed safety issues related to opioids including safe storage.  We discussed the fact that the patient should not drive an automobile or operate heavy machinery while taking this medication.  A prescription for naloxone was offered to the patient.  The patient will be re-evaluated for the need to continue opioid therapy in 60-90 days.  A PDMP report was reviewed today and was consistent with reported prescribing.  Tox screen is up-to-date and consistent with prescribing history.  Duration: Chronic/ongoing.  Intervention: Nothing at this time.

## 2024-04-16 ENCOUNTER — APPOINTMENT (OUTPATIENT)
Dept: PAIN MEDICINE | Facility: CLINIC | Age: 54
End: 2024-04-16
Payer: COMMERCIAL

## 2024-04-20 DIAGNOSIS — R53.83 OTHER FATIGUE: ICD-10-CM

## 2024-04-20 DIAGNOSIS — J45.20 MILD INTERMITTENT ASTHMA, UNSPECIFIED WHETHER COMPLICATED (HHS-HCC): ICD-10-CM

## 2024-04-22 RX ORDER — MONTELUKAST SODIUM 10 MG/1
10 TABLET ORAL DAILY
Qty: 28 TABLET | Refills: 10 | Status: SHIPPED | OUTPATIENT
Start: 2024-04-22

## 2024-04-22 RX ORDER — VENLAFAXINE HYDROCHLORIDE 75 MG/1
75 CAPSULE, EXTENDED RELEASE ORAL DAILY
Qty: 28 CAPSULE | Refills: 10 | Status: SHIPPED | OUTPATIENT
Start: 2024-04-22

## 2024-04-22 RX ORDER — FLUTICASONE FUROATE 200 UG/1
POWDER RESPIRATORY (INHALATION)
Qty: 30 EACH | Refills: 10 | Status: SHIPPED | OUTPATIENT
Start: 2024-04-22

## 2024-04-22 RX ORDER — FERROUS SULFATE TAB 325 MG (65 MG ELEMENTAL FE) 325 (65 FE) MG
1 TAB ORAL DAILY
Qty: 28 TABLET | Refills: 10 | Status: SHIPPED | OUTPATIENT
Start: 2024-04-22

## 2024-04-22 RX ORDER — CETIRIZINE HYDROCHLORIDE 10 MG/1
10 TABLET ORAL DAILY
Qty: 28 TABLET | Refills: 10 | Status: SHIPPED | OUTPATIENT
Start: 2024-04-22

## 2024-04-29 DIAGNOSIS — M54.12 RADICULOPATHY OF CERVICAL REGION: Primary | ICD-10-CM

## 2024-04-29 RX ORDER — CYCLOBENZAPRINE HCL 5 MG
5 TABLET ORAL 3 TIMES DAILY PRN
Qty: 30 TABLET | Refills: 2 | Status: SHIPPED | OUTPATIENT
Start: 2024-04-29 | End: 2024-05-29

## 2024-05-09 DIAGNOSIS — M54.12 RADICULOPATHY OF CERVICAL REGION: ICD-10-CM

## 2024-05-09 DIAGNOSIS — G89.4 CHRONIC PAIN SYNDROME: ICD-10-CM

## 2024-05-09 DIAGNOSIS — M48.02 SPINAL STENOSIS IN CERVICAL REGION: ICD-10-CM

## 2024-05-16 RX ORDER — BUPRENORPHINE 5 UG/H
1 PATCH TRANSDERMAL
Qty: 4 PATCH | Refills: 1 | OUTPATIENT
Start: 2024-05-19 | End: 2024-06-16

## 2024-05-17 ENCOUNTER — HOSPITAL ENCOUNTER (EMERGENCY)
Facility: HOSPITAL | Age: 54
Discharge: HOME | End: 2024-05-17
Attending: STUDENT IN AN ORGANIZED HEALTH CARE EDUCATION/TRAINING PROGRAM
Payer: COMMERCIAL

## 2024-05-17 VITALS
SYSTOLIC BLOOD PRESSURE: 139 MMHG | HEART RATE: 93 BPM | OXYGEN SATURATION: 95 % | BODY MASS INDEX: 36.79 KG/M2 | RESPIRATION RATE: 16 BRPM | DIASTOLIC BLOOD PRESSURE: 78 MMHG | TEMPERATURE: 97.5 F | WEIGHT: 170 LBS

## 2024-05-17 DIAGNOSIS — N39.0 URINARY TRACT INFECTION ASSOCIATED WITH CATHETERIZATION OF URINARY TRACT, UNSPECIFIED INDWELLING URINARY CATHETER TYPE, INITIAL ENCOUNTER (CMS-HCC): Primary | ICD-10-CM

## 2024-05-17 DIAGNOSIS — T83.511A URINARY TRACT INFECTION ASSOCIATED WITH CATHETERIZATION OF URINARY TRACT, UNSPECIFIED INDWELLING URINARY CATHETER TYPE, INITIAL ENCOUNTER (CMS-HCC): Primary | ICD-10-CM

## 2024-05-17 LAB
APPEARANCE UR: CLEAR
BACTERIA #/AREA URNS AUTO: ABNORMAL /HPF
BILIRUB UR STRIP.AUTO-MCNC: NEGATIVE MG/DL
COLOR UR: COLORLESS
GLUCOSE UR STRIP.AUTO-MCNC: ABNORMAL MG/DL
KETONES UR STRIP.AUTO-MCNC: NEGATIVE MG/DL
LEUKOCYTE ESTERASE UR QL STRIP.AUTO: ABNORMAL
MUCOUS THREADS #/AREA URNS AUTO: ABNORMAL /LPF
NITRITE UR QL STRIP.AUTO: ABNORMAL
PH UR STRIP.AUTO: 6.5 [PH]
PROT UR STRIP.AUTO-MCNC: NEGATIVE MG/DL
RBC # UR STRIP.AUTO: ABNORMAL /UL
RBC #/AREA URNS AUTO: ABNORMAL /HPF
SP GR UR STRIP.AUTO: 1
SQUAMOUS #/AREA URNS AUTO: ABNORMAL /HPF
UROBILINOGEN UR STRIP.AUTO-MCNC: NORMAL MG/DL
WBC #/AREA URNS AUTO: ABNORMAL /HPF
WBC CLUMPS #/AREA URNS AUTO: ABNORMAL /HPF

## 2024-05-17 PROCEDURE — 81003 URINALYSIS AUTO W/O SCOPE: CPT | Performed by: STUDENT IN AN ORGANIZED HEALTH CARE EDUCATION/TRAINING PROGRAM

## 2024-05-17 PROCEDURE — 99283 EMERGENCY DEPT VISIT LOW MDM: CPT

## 2024-05-17 PROCEDURE — 87086 URINE CULTURE/COLONY COUNT: CPT | Mod: TRILAB,WESLAB | Performed by: STUDENT IN AN ORGANIZED HEALTH CARE EDUCATION/TRAINING PROGRAM

## 2024-05-17 RX ORDER — BUPRENORPHINE 5 UG/H
PATCH TRANSDERMAL
COMMUNITY
Start: 2024-05-09

## 2024-05-17 RX ORDER — CEFPODOXIME PROXETIL 200 MG/1
200 TABLET, FILM COATED ORAL 2 TIMES DAILY
Qty: 14 TABLET | Refills: 0 | Status: SHIPPED | OUTPATIENT
Start: 2024-05-17 | End: 2024-05-24

## 2024-05-17 RX ORDER — FAMOTIDINE 40 MG/1
TABLET, FILM COATED ORAL
COMMUNITY
Start: 2024-04-29

## 2024-05-17 ASSESSMENT — PAIN DESCRIPTION - LOCATION: LOCATION: ABDOMEN

## 2024-05-17 ASSESSMENT — COLUMBIA-SUICIDE SEVERITY RATING SCALE - C-SSRS
2. HAVE YOU ACTUALLY HAD ANY THOUGHTS OF KILLING YOURSELF?: NO
1. IN THE PAST MONTH, HAVE YOU WISHED YOU WERE DEAD OR WISHED YOU COULD GO TO SLEEP AND NOT WAKE UP?: NO
6. HAVE YOU EVER DONE ANYTHING, STARTED TO DO ANYTHING, OR PREPARED TO DO ANYTHING TO END YOUR LIFE?: NO

## 2024-05-17 ASSESSMENT — PAIN SCALES - GENERAL: PAINLEVEL_OUTOF10: 3

## 2024-05-17 ASSESSMENT — PAIN - FUNCTIONAL ASSESSMENT: PAIN_FUNCTIONAL_ASSESSMENT: 0-10

## 2024-05-17 NOTE — ED PROVIDER NOTES
HPI   Chief Complaint   Patient presents with    UTI     Uti like symptoms over last few days     Abdominal Pain     Bladder spasms and minor discomfort. New onset        HPI  See [facility]                  Ronda Coma Scale Score: 15                     Patient History   Past Medical History:   Diagnosis Date    Abnormal urine sediment 06/11/2019    Asthma ([facility])     Cellulitis of right leg 08/01/2019    Cerebral palsy ([facility])     Conjunctivitis 12/05/2023    Dyspnea 12/05/2023    Blanco catheter present 12/12/2020    Jaskaran hematuria 12/05/2023    Hemorrhagic cystitis 10/14/2021    Left ankle sprain 02/04/2020    Low back pain, unspecified 12/05/2023    Neuropathy     HANDS AND FEET    Osteoarthritis     Urinary pain 03/09/2020    Urinary tract infection 09/17/2020     Past Surgical History:   Procedure Laterality Date    OTHER SURGICAL HISTORY  07/13/2022    Back surgery    OTHER SURGICAL HISTORY  07/13/2022    Gallbladder surgery    OTHER SURGICAL HISTORY  07/20/2022    Suprapubic catheter insertion     No family history on file.  Social History     Tobacco Use    Smoking status: Never    Smokeless tobacco: Never   Vaping Use    Vaping status: Never Used   Substance Use Topics    Alcohol use: Never    Drug use: Never       Physical Exam   ED Triage Vitals [05/17/24 0952]   Temperature Heart Rate Respirations BP   36.4 °C (97.5 °F) 93 16 139/78      Pulse Ox Temp Source Heart Rate Source Patient Position   95 % Oral Monitor --      BP Location FiO2 (%)     -- --       Physical Exam  See [facility]  ED Course & [facility]   Diagnoses as of 05/17/24 1034   Urinary tract infection associated with catheterization of urinary tract, unspecified indwelling urinary catheter type, initial encounter (CMS-HCC)       Medical Decision Making  53-year-old female with past medical history of cerebral palsy, chronic indwelling suprapubic Blanco catheter who presents emergency room with UTI symptoms.  Patient feels as though her urine is  foul-smelling and otherwise feels as though she has had been having bladder spasms.  Patient otherwise denies recent fevers, chills, nausea, vomiting, chest pain, shortness of breath, diarrhea or constipation or antibiotic use.  Patient is convinced that she takes daily antibiotics for UTI prophylaxis but at this time upon review of her paperwork as well as her current medication list there appears to be no antibiotic prescription.    ED Triage Vitals [05/17/24 0952]   Temperature Heart Rate Respirations BP   36.4 °C (97.5 °F) 93 16 139/78      Pulse Ox Temp Source Heart Rate Source Patient Position   95 % Oral Monitor --      BP Location FiO2 (%)     -- --       Vital signs reviewed: Afebrile, nontachycardic, normotensive, 95% on room air    Exam     Constitutional: No acute distress. Resting comfortably.   Head: Normocephalic, atraumatic.   Eyes: Pupils equal bilaterally, EOM grossly intact, conjunctiva normal.  Mouth/Throat: Oropharynx is clear, moist mucus membranes.   Neck: Supple. No lymphadenopathy.  Cardiovascular: Regular rate and regular rhythm. Extremities are well-perfused.   Pulmonary/Chest: No respiratory distress, breathing comfortably on room air.    Abdominal: Soft, non-tender, non-distended. No rebound or guarding.  Suprapubic Blanco catheter in place, no significant erythema noted at site of insertion.  Clear urine drainage appreciated in Blanco tubing.  Musculoskeletal: No lower extremity edema.       Skin: Warm, dry, and intact.   Neurological: Patient is oriented to person, place, time, and situation. Face symmetric, hearing intact to voice, speech normal.    Differential includes but is not limited to:  UTI versus hematuria versus Blanco malfunction        Labs: ordered.  Labs Reviewed   URINALYSIS WITH REFLEX CULTURE AND MICROSCOPIC - Abnormal       Result Value    Color, Urine Colorless (*)     Appearance, Urine Clear      Specific Gravity, Urine 1.004 (*)     pH, Urine 6.5      Protein, Urine  NEGATIVE      Glucose, Urine 300 (3+) (*)     Blood, Urine OVER (3+) (*)     Ketones, Urine NEGATIVE      Bilirubin, Urine NEGATIVE      Urobilinogen, Urine Normal      Nitrite, Urine 2+ (*)     Leukocyte Esterase, Urine 500 Stacie/µL (*)    MICROSCOPIC ONLY, URINE - Abnormal    WBC, Urine 21-50 (*)     WBC Clumps, Urine RARE      RBC, Urine 3-5      Squamous Epithelial Cells, Urine 1-9 (SPARSE)      Bacteria, Urine 4+ (*)     Mucus, Urine FEW     URINE CULTURE   URINALYSIS WITH REFLEX CULTURE AND MICROSCOPIC    Narrative:     The following orders were created for panel order Urinalysis with Reflex Culture and Microscopic.  Procedure                               Abnormality         Status                     ---------                               -----------         ------                     Urinalysis with Reflex C...[[id number]]  Abnormal            Final result               Extra Urine Gray Tube[[id number]]                                                         Please view results for these tests on the individual orders.   EXTRA URINE GRAY TUBE       Radiology: Considered but not indicated    ECG/medicine tests: ordered and independent interpretation performed.  Diagnoses as of 05/17/24 1034   Urinary tract infection associated with catheterization of urinary tract, unspecified indwelling urinary catheter type, initial encounter (CMS-HCC)     Urinalysis shows evidence of urinary tract infection.  Unclear if this represents chronic colonization but given patient's symptoms we will treat at this time with antibiotics.  Patient otherwise afebrile and hemodynamically stable here in the emergency room and stable at this time for discharge with follow-up with primary care provider and follow-up with urine cultures.    PLAN AND FOLLOW-UP: Patient counseled on all findings, diagnosis and treatment plan. Patient's questions and concerns addressed. Patient stable, discharged with instructions to follow up with PMD, and to  return to ED at any time for worsening symptoms or any other concerns. Patient demonstrates understanding of the findings and the importance of appropriate follow up care.    ED Medications managed:    Medications - No data to display    PATIENT REFERRED TO:  Michelle Griggs PA-C  [address] [place] [facility]  [address]  [place] [address]  [phone]            DISCHARGE MEDICATIONS:  New Prescriptions    CEFPODOXIME (VANTIN) 200 MG TABLET    Take 1 tablet (200 mg) by mouth 2 times a day for 7 days.       Aguilar Moralez MD  10:34 AM    Attending Emergency Physician  [facility]            Procedure  Procedures     Aguilar Moralez MD  05/17/24 1037

## 2024-05-18 LAB — BACTERIA UR CULT: ABNORMAL

## 2024-05-22 ENCOUNTER — TELEPHONE (OUTPATIENT)
Dept: PHARMACY | Facility: HOSPITAL | Age: 54
End: 2024-05-22
Payer: COMMERCIAL

## 2024-05-22 NOTE — PROGRESS NOTES
EDPD Note: Rapid Result Review    Reviewed Ms. Rita Sabillon 's chart regarding a multiple organisms present, probable contamination urine culture/result that was taken during their recent emergency room visit. The patient was not told about these results prior to leaving the emergency department. Therefore, patient was contacted and given proper education. Patient reported that urinary symptoms have not improved from ED visit on 05/17/2024. Discussed based on results of urine culture, if symptoms were not improving on cefpodoxime 200 mg BID x 7 days, patient would need to contact urologist or PCP for possible repeat urine tests to be completed. Patient had no other questions for pharmacy.     Urine Culture  Order: [id number] - Reflex for Order [id number]   Collected 5/17/2024 10:01       Status: Final result       Visible to patient: No (inaccessible in [facility])    Specimen Information: Suprapubic Catheter; Urine   2 Result Notes  Urine Culture Multiple organisms present, probable contamination. Repeat culture if clinically indicated. Abnormal            Resulting Agency: [facility]           Specimen Collected: 05/17/24 10:01 Last Resulted: 05/18/24 15:07             No further follow up needed from EDPD Team.     Marcella Brown, PharmD

## 2024-05-23 DIAGNOSIS — M48.02 SPINAL STENOSIS IN CERVICAL REGION: ICD-10-CM

## 2024-05-23 DIAGNOSIS — G89.4 CHRONIC PAIN SYNDROME: ICD-10-CM

## 2024-05-23 DIAGNOSIS — M54.12 RADICULOPATHY OF CERVICAL REGION: ICD-10-CM

## 2024-05-23 NOTE — TELEPHONE ENCOUNTER
Pt. Called asking for prescription refill. Pt. Had prescription filled 5/12/24 next appointment scheduled for 6/6/24. RN called to confirm information with pt. Awaiting call back from pt.

## 2024-06-06 ENCOUNTER — APPOINTMENT (OUTPATIENT)
Dept: PAIN MEDICINE | Facility: CLINIC | Age: 54
End: 2024-06-06
Payer: COMMERCIAL

## 2024-06-11 ENCOUNTER — APPOINTMENT (OUTPATIENT)
Dept: PAIN MEDICINE | Facility: CLINIC | Age: 54
End: 2024-06-11
Payer: COMMERCIAL

## 2024-06-11 NOTE — PROGRESS NOTES
Subjective   Patient ID: Rita Sabillon is a 53 y.o. female who presents for Pain Management.     HPI 52 YO Female with a PMHx significant for cerebral palsy, asthma, chronic neck pain, muscle spasms cervicalgia and chronic pain syndrome. She presents for a pain management follow-up and medication refill.  Today she reports no changes to her pain since her last visit with Dr. Morrow.  Her current pain regimen has consisted of Percocet 7.5-325 mg every 12 hours as needed and Belbuca 150 mcg every 12 hours.  In addition she takes baclofen 20 mg 3 times daily and cyclobenzaprine 5 mg every 8 hours as needed.  She also takes diclofenac 50 mg twice daily and gabapentin 600 mg 3 times daily.  Of note, Rita was recently titrated off Belbuca due to insurance no longer covering and was started on Buprenorphine 5mcg/hr patch last visit. We also increased her Percocet from BID to TID while trialing the new regimen. Today she reports, that the change in her medication regimen has been significantly beneficial.  She reports that she was able to go back to work 4 days a week as well as participate in some recreational activities for enjoyment.     Review of Systems Unless noted in the HPI all other systems have been reviewed and are negative for complaint.     Objective   Physical Exam  General- No acute distress, well appearing and well nourished. Obese  Eyes Conjunctiva and lids: No erythema, swelling or discharge  Neck - Supple, no cervical lymphadenopathy.   Pulmonary - Respiratory effort: Normal respiration.   Cardiovascular - Normal rate and rhythm.  Examination of extremities for edema and/or varicosities: No peripheral edema  Abdomen: Soft, Non-tender, non-distended, no abdominal masses.   Musculoskeletal - spine with reduced ROM; especially cervical area. Bilateral shoulders with significantly reduced ROM. BLE with atrophy noted.   Skin - Skin and subcutaneous tissue: Normal without rashes or lesions.  Neurologic  - Abnormal gait; use of motorized wheelchair for mobility. A&O x3.   Psychiatric - Orientation to person, place, and time: Normal. Mood and affect: Normal    Assessment/Plan   {Assess/PlanSRosalesinks:23546}    TREATMENT PLAN:  I had a nice discussion with the patient today and our plan will be as follows:  Radiology: No new imaging to review at this time.   Physically: Encouraged patient to continue with increased physical activity as able.   Psychologically: No acute psychological needs at this time.    Medication: I will refill the patient's opioids today for [ 2 ] months.  The patient continues to see benefit and improvement in their quality of life and ability to maintain ADLs. Patient educated about the risks of taking opioids and operating a motor vehicle. Patient reports no adverse side effects to current medication regimen.  Current regimen does allow patient to maintain ADLs.  Patient reports no new neurologic symptoms, new pain areas, or exacerbation in pain today.  Patient reports they are happy with current treatment care path.    Patient has been educated on the risks, benefits, and alternatives of controlled substances as well as the proper way to store these medications. The patient and I discussed the nature of this medication and its side effects.  We discussed tolerance, physical dependence, psychological dependence, addiction and opioid-induced hyperalgesia.  We discussed the potential need to wean from this medication.  We discussed the availability of programs that can help with this process if necessary.  We discussed safety issues related to opioids including safe storage.  We discussed the fact that the patient should not drive an automobile or operate heavy machinery while taking this medication.  A prescription for naloxone was offered to the patient.  The patient will be re-evaluated for the need to continue opioid therapy in 60-90 days.  A PDMP report was reviewed today and was consistent  with reported prescribing.  Tox screen is up-to-date and consistent with prescribing history.  Duration: Chronic/ongoing.  Intervention: Nothing at this time.

## 2024-06-13 ENCOUNTER — APPOINTMENT (OUTPATIENT)
Dept: PAIN MEDICINE | Facility: CLINIC | Age: 54
End: 2024-06-13
Payer: COMMERCIAL

## 2024-06-20 ENCOUNTER — APPOINTMENT (OUTPATIENT)
Dept: PAIN MEDICINE | Facility: CLINIC | Age: 54
End: 2024-06-20
Payer: COMMERCIAL

## 2024-06-24 NOTE — PROGRESS NOTES
Subjective   Patient ID: Rita Sabillon is a 53 y.o. female who presents for Pain Management.     HPI 52 YO Female with a PMHx significant for cerebral palsy, asthma, chronic neck pain, muscle spasms cervicalgia and chronic pain syndrome. She presents for a pain management follow-up and medication refill.  Her current pain regimen consists of Percocet 7.5-325 mg every 12 hours as needed and Buprenorphine 5mcg/hr patch every 7 days. Due to scheduling conflicts, Rita is completely out of both medications and is really struggling. Prior to running out, she reports that her pain is well-controlled. She has been able to participate in more ADLs as well as actually participate in some fun activities. She denies any adverse effects from the medication regimen.      Review of Systems Unless noted in the HPI all other systems have been reviewed and are negative for complaint.     Objective   Physical Exam  General- No acute distress, well appearing and well nourished. Obese  Eyes Conjunctiva and lids: No erythema, swelling or discharge  Neck - Supple, no cervical lymphadenopathy.   Pulmonary - Respiratory effort: Normal respiration.   Cardiovascular - Normal rate and rhythm.  Examination of extremities for edema and/or varicosities: No peripheral edema  Abdomen: Soft, Non-tender, non-distended, no abdominal masses.   Musculoskeletal - spine with reduced ROM; especially cervical area. Bilateral shoulders with significantly reduced ROM. BLE with atrophy noted.   Skin - Skin and subcutaneous tissue: Normal without rashes or lesions.  Neurologic - Abnormal gait; use of motorized wheelchair for mobility. A&O x3.   Psychiatric - Orientation to person, place, and time: Normal. Mood and affect: Normal.    Assessment/Plan   Problem List Items Addressed This Visit       Spinal stenosis in cervical region - Primary    Relevant Medications    oxyCODONE-acetaminophen (Percocet) 7.5-325 mg tablet    oxyCODONE-acetaminophen (Percocet)  7.5-325 mg tablet (Start on 7/25/2024)    buprenorphine (Butrans) 5 mcg/hour patch (Start on 6/30/2024)     TREATMENT PLAN:  I had a nice discussion with the patient today and our plan will be as follows:  Radiology: No new imaging to review at this time.   Physically: Encouraged patient to continue with increased physical activity as able.   Psychologically: No acute psychological needs at this time.    Medication: I will refill the patient's opioids today for [ 2 ] months.  The patient continues to see benefit and improvement in their quality of life and ability to maintain ADLs. Patient educated about the risks of taking opioids and operating a motor vehicle. Patient reports no adverse side effects to current medication regimen.  Current regimen does allow patient to maintain ADLs.  Patient reports no new neurologic symptoms, new pain areas, or exacerbation in pain today.  Patient reports they are happy with current treatment care path.    Patient has been educated on the risks, benefits, and alternatives of controlled substances as well as the proper way to store these medications. The patient and I discussed the nature of this medication and its side effects.  We discussed tolerance, physical dependence, psychological dependence, addiction and opioid-induced hyperalgesia.  We discussed the potential need to wean from this medication.  We discussed the availability of programs that can help with this process if necessary.  We discussed safety issues related to opioids including safe storage.  We discussed the fact that the patient should not drive an automobile or operate heavy machinery while taking this medication.  A prescription for naloxone was offered to the patient.  The patient will be re-evaluated for the need to continue opioid therapy in 60-90 days.  A PDMP report was reviewed today and was consistent with reported prescribing.  Tox screen is up-to-date and consistent with prescribing  history.  Duration: Chronic/ongoing.  Intervention: Nothing at this time.

## 2024-06-25 ENCOUNTER — OFFICE VISIT (OUTPATIENT)
Dept: PAIN MEDICINE | Facility: CLINIC | Age: 54
End: 2024-06-25
Payer: COMMERCIAL

## 2024-06-25 VITALS
HEART RATE: 100 BPM | DIASTOLIC BLOOD PRESSURE: 56 MMHG | WEIGHT: 170 LBS | OXYGEN SATURATION: 96 % | BODY MASS INDEX: 36.68 KG/M2 | SYSTOLIC BLOOD PRESSURE: 133 MMHG | HEIGHT: 57 IN

## 2024-06-25 DIAGNOSIS — M48.02 SPINAL STENOSIS IN CERVICAL REGION: Primary | ICD-10-CM

## 2024-06-25 PROCEDURE — 99214 OFFICE O/P EST MOD 30 MIN: CPT | Performed by: NURSE PRACTITIONER

## 2024-06-25 PROCEDURE — 1036F TOBACCO NON-USER: CPT | Performed by: NURSE PRACTITIONER

## 2024-06-25 RX ORDER — OXYCODONE AND ACETAMINOPHEN 7.5; 325 MG/1; MG/1
1 TABLET ORAL EVERY 8 HOURS PRN
Qty: 90 TABLET | Refills: 0 | Status: SHIPPED | OUTPATIENT
Start: 2024-06-25 | End: 2024-07-25

## 2024-06-25 RX ORDER — BUPRENORPHINE 5 UG/H
1 PATCH TRANSDERMAL
Qty: 4 PATCH | Refills: 1 | Status: SHIPPED | OUTPATIENT
Start: 2024-06-30 | End: 2024-06-26

## 2024-06-25 RX ORDER — OXYCODONE AND ACETAMINOPHEN 7.5; 325 MG/1; MG/1
1 TABLET ORAL EVERY 8 HOURS PRN
Qty: 90 TABLET | Refills: 0 | Status: SHIPPED | OUTPATIENT
Start: 2024-07-25 | End: 2024-08-24

## 2024-06-25 ASSESSMENT — PAIN DESCRIPTION - DESCRIPTORS: DESCRIPTORS: ACHING

## 2024-06-25 ASSESSMENT — PAIN SCALES - GENERAL
PAINLEVEL_OUTOF10: 8
PAINLEVEL: 8

## 2024-06-25 ASSESSMENT — PAIN - FUNCTIONAL ASSESSMENT: PAIN_FUNCTIONAL_ASSESSMENT: 0-10

## 2024-06-25 NOTE — PROGRESS NOTES
MEDICATION NAME: Oxycodone/acetaminophen  STRENGTH: 7.5-325mg  LAST FILL DATE: 24  DATE LAST TAKEN: 24  QUANTITY FILLED: 90  QUANTITY REMAININ  COUNT COMPLETED BY: JOHNNY AVINA MA and ANA LUISA BERGMAN      UDS LAST COMPLETED:   CONTROLLED SUBSTANCES AGREEMENT LAST SIGNED:   ORT LAST COMPLETED:  Modified Oswestry disability form filled out annually.

## 2024-06-26 ENCOUNTER — TELEPHONE (OUTPATIENT)
Dept: PAIN MEDICINE | Facility: CLINIC | Age: 54
End: 2024-06-26
Payer: COMMERCIAL

## 2024-06-26 DIAGNOSIS — M48.02 SPINAL STENOSIS IN CERVICAL REGION: ICD-10-CM

## 2024-06-26 RX ORDER — BUPRENORPHINE 5 UG/H
PATCH TRANSDERMAL
Qty: 4 PATCH | Refills: 0 | OUTPATIENT
Start: 2024-06-26

## 2024-06-26 RX ORDER — BUPRENORPHINE 5 UG/H
1 PATCH TRANSDERMAL
Qty: 4 PATCH | Refills: 1 | Status: SHIPPED | OUTPATIENT
Start: 2024-06-26 | End: 2024-07-24

## 2024-06-26 NOTE — TELEPHONE ENCOUNTER
Rita called looking for script for pain patches that were discussed at her appointment yesterday.  Please adrianne.

## 2024-06-27 ENCOUNTER — APPOINTMENT (OUTPATIENT)
Dept: PRIMARY CARE | Facility: CLINIC | Age: 54
End: 2024-06-27
Payer: COMMERCIAL

## 2024-07-17 ENCOUNTER — APPOINTMENT (OUTPATIENT)
Dept: CARDIOLOGY | Facility: HOSPITAL | Age: 54
End: 2024-07-17
Payer: COMMERCIAL

## 2024-07-17 ENCOUNTER — HOSPITAL ENCOUNTER (EMERGENCY)
Facility: HOSPITAL | Age: 54
Discharge: HOME | End: 2024-07-17
Attending: EMERGENCY MEDICINE
Payer: COMMERCIAL

## 2024-07-17 ENCOUNTER — APPOINTMENT (OUTPATIENT)
Dept: RADIOLOGY | Facility: HOSPITAL | Age: 54
End: 2024-07-17
Payer: COMMERCIAL

## 2024-07-17 VITALS
DIASTOLIC BLOOD PRESSURE: 84 MMHG | TEMPERATURE: 98.1 F | WEIGHT: 180 LBS | HEART RATE: 111 BPM | HEIGHT: 58 IN | RESPIRATION RATE: 18 BRPM | OXYGEN SATURATION: 94 % | SYSTOLIC BLOOD PRESSURE: 138 MMHG | BODY MASS INDEX: 37.79 KG/M2

## 2024-07-17 DIAGNOSIS — I10 DIASTOLIC HYPERTENSION: ICD-10-CM

## 2024-07-17 DIAGNOSIS — U07.1 COVID-19: ICD-10-CM

## 2024-07-17 DIAGNOSIS — R06.02 SHORTNESS OF BREATH: Primary | ICD-10-CM

## 2024-07-17 LAB
ALBUMIN SERPL-MCNC: 4 G/DL (ref 3.5–5)
ALP BLD-CCNC: 156 U/L (ref 35–125)
ALT SERPL-CCNC: 41 U/L (ref 5–40)
ANION GAP SERPL CALC-SCNC: 14 MMOL/L
AST SERPL-CCNC: 36 U/L (ref 5–40)
BASOPHILS # BLD AUTO: 0.01 X10*3/UL (ref 0–0.1)
BASOPHILS NFR BLD AUTO: 0.1 %
BILIRUB SERPL-MCNC: 0.5 MG/DL (ref 0.1–1.2)
BUN SERPL-MCNC: 4 MG/DL (ref 8–25)
CALCIUM SERPL-MCNC: 8.9 MG/DL (ref 8.5–10.4)
CHLORIDE SERPL-SCNC: 96 MMOL/L (ref 97–107)
CO2 SERPL-SCNC: 26 MMOL/L (ref 24–31)
CREAT SERPL-MCNC: 0.2 MG/DL (ref 0.4–1.6)
EGFRCR SERPLBLD CKD-EPI 2021: >90 ML/MIN/1.73M*2
EOSINOPHIL # BLD AUTO: 0.04 X10*3/UL (ref 0–0.7)
EOSINOPHIL NFR BLD AUTO: 0.5 %
ERYTHROCYTE [DISTWIDTH] IN BLOOD BY AUTOMATED COUNT: 13.5 % (ref 11.5–14.5)
GLUCOSE BLD MANUAL STRIP-MCNC: 136 MG/DL (ref 74–99)
GLUCOSE SERPL-MCNC: 273 MG/DL (ref 65–99)
HCT VFR BLD AUTO: 43.9 % (ref 36–46)
HGB BLD-MCNC: 14.9 G/DL (ref 12–16)
IMM GRANULOCYTES # BLD AUTO: 0.03 X10*3/UL (ref 0–0.7)
IMM GRANULOCYTES NFR BLD AUTO: 0.4 % (ref 0–0.9)
LACTATE BLDV-SCNC: 1.6 MMOL/L (ref 0.4–2)
LYMPHOCYTES # BLD AUTO: 2.12 X10*3/UL (ref 1.2–4.8)
LYMPHOCYTES NFR BLD AUTO: 27.5 %
MCH RBC QN AUTO: 30.2 PG (ref 26–34)
MCHC RBC AUTO-ENTMCNC: 33.9 G/DL (ref 32–36)
MCV RBC AUTO: 89 FL (ref 80–100)
MONOCYTES # BLD AUTO: 0.57 X10*3/UL (ref 0.1–1)
MONOCYTES NFR BLD AUTO: 7.4 %
NEUTROPHILS # BLD AUTO: 4.95 X10*3/UL (ref 1.2–7.7)
NEUTROPHILS NFR BLD AUTO: 64.1 %
NRBC BLD-RTO: 0 /100 WBCS (ref 0–0)
NT-PROBNP SERPL-MCNC: <36 PG/ML (ref 0–192)
PLATELET # BLD AUTO: 309 X10*3/UL (ref 150–450)
POTASSIUM SERPL-SCNC: 3.8 MMOL/L (ref 3.4–5.1)
PROT SERPL-MCNC: 7.5 G/DL (ref 5.9–7.9)
RBC # BLD AUTO: 4.94 X10*6/UL (ref 4–5.2)
S PYO DNA THROAT QL NAA+PROBE: NOT DETECTED
SARS-COV-2 RNA RESP QL NAA+PROBE: DETECTED
SODIUM SERPL-SCNC: 136 MMOL/L (ref 133–145)
TROPONIN T SERPL-MCNC: 8 NG/L
TROPONIN T SERPL-MCNC: <6 NG/L
WBC # BLD AUTO: 7.7 X10*3/UL (ref 4.4–11.3)

## 2024-07-17 PROCEDURE — 83880 ASSAY OF NATRIURETIC PEPTIDE: CPT | Performed by: CLINICAL NURSE SPECIALIST

## 2024-07-17 PROCEDURE — 71045 X-RAY EXAM CHEST 1 VIEW: CPT | Performed by: RADIOLOGY

## 2024-07-17 PROCEDURE — 82947 ASSAY GLUCOSE BLOOD QUANT: CPT

## 2024-07-17 PROCEDURE — 80053 COMPREHEN METABOLIC PANEL: CPT | Performed by: EMERGENCY MEDICINE

## 2024-07-17 PROCEDURE — 85025 COMPLETE CBC W/AUTO DIFF WBC: CPT | Performed by: EMERGENCY MEDICINE

## 2024-07-17 PROCEDURE — 2550000001 HC RX 255 CONTRASTS: Performed by: EMERGENCY MEDICINE

## 2024-07-17 PROCEDURE — 94640 AIRWAY INHALATION TREATMENT: CPT

## 2024-07-17 PROCEDURE — 71275 CT ANGIOGRAPHY CHEST: CPT

## 2024-07-17 PROCEDURE — 84484 ASSAY OF TROPONIN QUANT: CPT | Performed by: EMERGENCY MEDICINE

## 2024-07-17 PROCEDURE — 36415 COLL VENOUS BLD VENIPUNCTURE: CPT | Performed by: CLINICAL NURSE SPECIALIST

## 2024-07-17 PROCEDURE — 71275 CT ANGIOGRAPHY CHEST: CPT | Performed by: RADIOLOGY

## 2024-07-17 PROCEDURE — 2500000002 HC RX 250 W HCPCS SELF ADMINISTERED DRUGS (ALT 637 FOR MEDICARE OP, ALT 636 FOR OP/ED): Performed by: CLINICAL NURSE SPECIALIST

## 2024-07-17 PROCEDURE — 99285 EMERGENCY DEPT VISIT HI MDM: CPT | Mod: 25

## 2024-07-17 PROCEDURE — 87635 SARS-COV-2 COVID-19 AMP PRB: CPT | Performed by: EMERGENCY MEDICINE

## 2024-07-17 PROCEDURE — 93005 ELECTROCARDIOGRAM TRACING: CPT

## 2024-07-17 PROCEDURE — 84075 ASSAY ALKALINE PHOSPHATASE: CPT | Performed by: STUDENT IN AN ORGANIZED HEALTH CARE EDUCATION/TRAINING PROGRAM

## 2024-07-17 PROCEDURE — 36415 COLL VENOUS BLD VENIPUNCTURE: CPT | Performed by: STUDENT IN AN ORGANIZED HEALTH CARE EDUCATION/TRAINING PROGRAM

## 2024-07-17 PROCEDURE — 83605 ASSAY OF LACTIC ACID: CPT | Performed by: CLINICAL NURSE SPECIALIST

## 2024-07-17 PROCEDURE — 9420000001 HC RT PATIENT EDUCATION 5 MIN

## 2024-07-17 PROCEDURE — 85025 COMPLETE CBC W/AUTO DIFF WBC: CPT | Performed by: STUDENT IN AN ORGANIZED HEALTH CARE EDUCATION/TRAINING PROGRAM

## 2024-07-17 PROCEDURE — 87651 STREP A DNA AMP PROBE: CPT | Performed by: EMERGENCY MEDICINE

## 2024-07-17 PROCEDURE — 82947 ASSAY GLUCOSE BLOOD QUANT: CPT | Mod: 59

## 2024-07-17 PROCEDURE — 84484 ASSAY OF TROPONIN QUANT: CPT | Performed by: STUDENT IN AN ORGANIZED HEALTH CARE EDUCATION/TRAINING PROGRAM

## 2024-07-17 PROCEDURE — 71045 X-RAY EXAM CHEST 1 VIEW: CPT

## 2024-07-17 PROCEDURE — 87040 BLOOD CULTURE FOR BACTERIA: CPT | Mod: TRILAB | Performed by: CLINICAL NURSE SPECIALIST

## 2024-07-17 RX ORDER — IPRATROPIUM BROMIDE AND ALBUTEROL SULFATE 2.5; .5 MG/3ML; MG/3ML
3 SOLUTION RESPIRATORY (INHALATION) EVERY 2 HOUR PRN
Status: DISCONTINUED | OUTPATIENT
Start: 2024-07-17 | End: 2024-07-17 | Stop reason: HOSPADM

## 2024-07-17 RX ORDER — IPRATROPIUM BROMIDE AND ALBUTEROL SULFATE 2.5; .5 MG/3ML; MG/3ML
3 SOLUTION RESPIRATORY (INHALATION)
Status: DISCONTINUED | OUTPATIENT
Start: 2024-07-17 | End: 2024-07-17

## 2024-07-17 RX ORDER — GUAIFENESIN 600 MG/1
600 TABLET, EXTENDED RELEASE ORAL 2 TIMES DAILY
Qty: 10 TABLET | Refills: 0 | Status: SHIPPED | OUTPATIENT
Start: 2024-07-17 | End: 2024-07-22

## 2024-07-17 RX ORDER — ALBUTEROL SULFATE 90 UG/1
2 AEROSOL, METERED RESPIRATORY (INHALATION) EVERY 4 HOURS PRN
Qty: 18 G | Refills: 0 | Status: SHIPPED | OUTPATIENT
Start: 2024-07-17 | End: 2024-08-16

## 2024-07-17 RX ORDER — IPRATROPIUM BROMIDE AND ALBUTEROL SULFATE 2.5; .5 MG/3ML; MG/3ML
3 SOLUTION RESPIRATORY (INHALATION)
Status: DISCONTINUED | OUTPATIENT
Start: 2024-07-17 | End: 2024-07-17 | Stop reason: HOSPADM

## 2024-07-17 ASSESSMENT — HEART SCORE
TROPONIN: LESS THAN OR EQUAL TO NORMAL LIMIT
ECG: NORMAL
RISK FACTORS: 1-2 RISK FACTORS
HEART SCORE: 2
AGE: 45-64
HISTORY: SLIGHTLY SUSPICIOUS

## 2024-07-17 ASSESSMENT — PAIN SCALES - GENERAL: PAINLEVEL_OUTOF10: 4

## 2024-07-17 ASSESSMENT — PAIN DESCRIPTION - PROGRESSION: CLINICAL_PROGRESSION: NOT CHANGED

## 2024-07-17 ASSESSMENT — PAIN - FUNCTIONAL ASSESSMENT: PAIN_FUNCTIONAL_ASSESSMENT: 0-10

## 2024-07-17 NOTE — ED PROVIDER NOTES
Department of Emergency Medicine   ED  Provider Note  Admit Date/RoomTime: 7/17/2024 11:56 AM  ED Room: AC03/03        History of Present Illness:  Chief Complaint   Patient presents with    Shortness of Breath     Pt bib EMS from home for SOB, states it started 3-4 days ago, not normally on oxygen, lung sounds with crackles in bases. Pt given 125 of solu-medrol and a duoneb by EMS, 20G placed.          Rita Sabillon is a 54 y.o. female history of asthma cerebral palsy presenting to the ED for cough shortness of breath onset of symptoms for 5 days.,  Patient reports her cough is persistent.  She denies any fever or chills.  No runny nose sore throat.  No abdominal pain nausea vomiting or diarrhea.  No rashes or sores.  Currently lives at home with home health care.  EMS was called due to cough and shortness of breath received Solu-Medrol and DuoNeb upon transfer..  She denies chest pain or wheezing.  Patient complains of a tightness in her chest.    Review of Systems:   Pertinent positives and negatives are stated within HPI, all other systems reviewed and are negative.        --------------------------------------------- PAST HISTORY ---------------------------------------------  Past Medical History:  has a past medical history of Abnormal urine sediment (06/11/2019), Asthma ([facility]), Cellulitis of right leg (08/01/2019), Cerebral palsy (Multi), Conjunctivitis (12/05/2023), Dyspnea (12/05/2023), Blanco catheter present (12/12/2020), Jaskaran hematuria (12/05/2023), Hemorrhagic cystitis (10/14/2021), Left ankle sprain (02/04/2020), Low back pain, unspecified (12/05/2023), Neuropathy, Osteoarthritis, Urinary pain (03/09/2020), and Urinary tract infection (09/17/2020).  Past Surgical History:  has a past surgical history that includes Other surgical history (07/13/2022); Other surgical history (07/13/2022); and Other surgical history (07/20/2022).  Social History:  reports that she has never smoked. She has never  "used smokeless tobacco. She reports that she does not drink alcohol and does not use drugs.  Family History: family history is not on file.. Unless otherwise noted, family history is non contributory  The patient’s home medications have been reviewed.  Allergies: Patient has no known allergies.        ---------------------------------------------------PHYSICAL EXAM--------------------------------------    GENERAL APPEARANCE: Awake and alert.   VITAL SIGNS: As per the nurses' triage record.  Tachycardia  HEENT: Normocephalic, atraumatic. Extraocular muscles are intact. Pupils equal round and reactive to light. Conjunctiva are pink. Negative scleral icterus. Mucous membranes are moist. Tongue in the midline. Pharynx was without erythema or exudates, uvula midline  NECK: Soft Nontender and supple, full gross ROM, no meningeal signs.  CHEST: N nontender to palpation.   Loose barky nonproductive cough diminished no use of accessory muscles or nasal flaring no pursed of breathing or tripod position noted.  HEART: S1, S2. Regular rate and rhythm. No murmurs, gallops or rubs.  Strong and equal pulses in the extremities.   ABDOMEN: Soft, nontender, nondistended, positive bowel sounds, no palpable masses.  MUSCULCSKELETAL: Contractures of the hands.  And feet.  Lower extremity edema nonpitting.  Peripheral pulses intact.  NEUROLOGICAL: Awake, alert and oriented x 3. Power intact in the upper and lower extremities. Sensation is intact to light touch in the upper and lower extremities.   IMMUNOLOGICAL: No lymphatic streaking noted   DERM: No petechiae, rashes, or ecchymoses.          ------------------------- NURSING NOTES AND VITALS REVIEWED ---------------------------  The nursing notes within the ED encounter and vital signs as below have been reviewed by myself  /84 (BP Location: Right arm)   Pulse (!) 111   Temp 36.7 °C (98.1 °F) (Tympanic)   Resp 18   Ht 1.473 m (4' 10\")   Wt 81.6 kg (180 lb)   LMP  (LMP " Unknown)   SpO2 94%   BMI 37.62 kg/m²     Oxygen Saturation Interpretation: 94% 2 L nasal cannula    The cardiac monitor revealed sinus tachycardia with a heart rate in the 100s as interpreted by me. The cardiac monitor was ordered secondary to the patient's heart rate and to monitor the patient for dysrhythmia.       The patient’s available past medical records and past encounters were reviewed.          -----------------------DIAGNOSTIC RESULTS------------------------  LABS:    Labs Reviewed   COMPREHENSIVE METABOLIC PANEL - Abnormal       Result Value    Glucose 273 (*)     Sodium 136      Potassium 3.8      Chloride 96 (*)     Bicarbonate 26      Urea Nitrogen 4 (*)     Creatinine 0.20 (*)     eGFR >90      Calcium 8.9      Albumin 4.0      Alkaline Phosphatase 156 (*)     Total Protein 7.5      AST 36      Bilirubin, Total 0.5      ALT 41 (*)     Anion Gap 14     SARS-COV-2 PCR - Abnormal    Coronavirus 2019, PCR Detected (*)     Narrative:     This assay has received FDA Emergency Use Authorization (EUA) and is only authorized for the duration of time that circumstances exist to justify the authorization of the emergency use of in vitro diagnostic tests for the detection of SARS-CoV-2 virus and/or diagnosis of COVID-19 infection under section 564(b)(1) of the Act, 21 U.S.C. 360bbb-3(b)(1). This assay is an in vitro diagnostic nucleic acid amplification test for the qualitative detection of SARS-CoV-2 from nasopharyngeal specimens and has been validated for use at [facility]. Negative results do not preclude COVID-19 infections and should not be used as the sole basis for diagnosis, treatment, or other management decisions.     POCT GLUCOSE - Abnormal    POCT Glucose 136 (*)    GROUP A STREPTOCOCCUS, PCR - Normal    Group A Strep PCR Not Detected     SERIAL TROPONIN, INITIAL ([facility]) - Normal    Troponin T, High Sensitivity 8     SERIAL TROPONIN,  2 HOUR ([facility]) - Normal    Troponin T,  High Sensitivity <6     BLOOD GAS LACTIC ACID, VENOUS - Normal    POCT Lactate, Venous 1.6     N-TERMINAL PROBNP - Normal    PROBNP <36      Narrative:     Reference ranges are based on clinical submission data. These ranges represent the 95th percentile of normal cut-off points. As NT Pro- BNP values approach 1000 pg/ml, clinical symptoms are more likely associated with CHF.   BLOOD CULTURE   BLOOD CULTURE   CBC WITH AUTO DIFFERENTIAL    WBC 7.7      nRBC 0.0      RBC 4.94      Hemoglobin 14.9      Hematocrit 43.9      MCV 89      MCH 30.2      MCHC 33.9      RDW 13.5      Platelets 309      Neutrophils % 64.1      Immature Granulocytes %, Automated 0.4      Lymphocytes % 27.5      Monocytes % 7.4      Eosinophils % 0.5      Basophils % 0.1      Neutrophils Absolute 4.95      Immature Granulocytes Absolute, Automated 0.03      Lymphocytes Absolute 2.12      Monocytes Absolute 0.57      Eosinophils Absolute 0.04      Basophils Absolute 0.01     TROPONIN T SERIES, HIGH SENSITIVITY (0, 2 HR, 6 HR)    Narrative:     The following orders were created for panel order Troponin T Series, High Sensitivity (0, 2HR, 6HR).  Procedure                               Abnormality         Status                     ---------                               -----------         ------                     Serial Troponin, Initial...[[id number]]  Normal              Final result               Serial Troponin, 2 Hour ...[[id number]]  Normal              Final result               Serial Troponin, 6 Hour ...[[id number]]                                                   Please view results for these tests on the individual orders.   SERIAL TROPONIN, 6 HOUR ([facility])       As interpreted by me, the above displayed labs are abnormal. All other labs obtained during this visit were within normal range or not returned as of this dictation.      EKG Interpretation    EKG with sinus tachycardia 103 bpm, low voltage, normal axis, normal ST segment, and a  slight diffuse flattening of the T waves per attending note        CT angio chest for pulmonary embolism   Final Result   1.  No evidence of pulmonary embolism.   2. Cardiomegaly with low volume left lung. Bibasilar and left   perihilar ground-glass attenuation likely representing interstitial   edema and low-grade congestive heart failure.   3. Large geographic areas of low attenuation in the left lobe of the   liver with relatively sharp border suggesting liver infarcts.   4. No consolidations or pleural effusion.                  MACRO:   None        Signed by: Basil Torres 7/17/2024 3:25 PM   Dictation workstation:   [id number]      XR chest 1 view   Final Result   No acute cardiopulmonary abnormality.        Signed by: Africa Cartwright 7/17/2024 1:20 PM   Dictation workstation:   [id number]              CT angio chest for pulmonary embolism   Final Result   1.  No evidence of pulmonary embolism.   2. Cardiomegaly with low volume left lung. Bibasilar and left   perihilar ground-glass attenuation likely representing interstitial   edema and low-grade congestive heart failure.   3. Large geographic areas of low attenuation in the left lobe of the   liver with relatively sharp border suggesting liver infarcts.   4. No consolidations or pleural effusion.                  MACRO:   None        Signed by: Basil Torres 7/17/2024 3:25 PM   Dictation workstation:   [id number]      XR chest 1 view   Final Result   No acute cardiopulmonary abnormality.        Signed by: Africa Cartwright 7/17/2024 1:20 PM   Dictation workstation:   [id number]              ------------------------------ ED COURSE/MEDICAL DECISION MAKING----------------------  Medical Decision Making:   Exam: A medically appropriate exam performed, outlined above, given the known history and presentation.    History obtained from: Review of medical record nursing notes patient      Social Determinants of Health considered during this visit:  Home with home  health      PAST MEDICAL HISTORY/Chronic Conditions Affecting Care     has a past medical history of Abnormal urine sediment (06/11/2019), Asthma ([facility]), Cellulitis of right leg (08/01/2019), Cerebral palsy (Multi), Conjunctivitis (12/05/2023), Dyspnea (12/05/2023), Blanco catheter present (12/12/2020), Jaskaran hematuria (12/05/2023), Hemorrhagic cystitis (10/14/2021), Left ankle sprain (02/04/2020), Low back pain, unspecified (12/05/2023), Neuropathy, Osteoarthritis, Urinary pain (03/09/2020), and Urinary tract infection (09/17/2020).       CC/HPI Summary, Social Determinants of health, Records Reviewed, DDx, testing done/not done, ED Course, Reassessment, disposition considerations/shared decision making with patient, consults, disposition:   Presents with cough shortness of breath hypoxia  Plan  Strep  COVID  DuoNeb  CT angio 1.  No evidence of pulmonary embolism.  2. Cardiomegaly with low volume left lung. Bibasilar and left  perihilar ground-glass attenuation likely representing interstitial  edema and low-grade congestive heart failure.  3. Large geographic areas of low attenuation in the left lobe of the  liver with relatively sharp border suggesting liver infarcts.  4. No consolidations or pleural effusion.  Chest x-ray No acute cardiopulmonary abnormality.   EKG  Blood culture  Lactic acid  CBC  CMP  proBNP  Troponin  Medical Decision Making/Differential Diagnosis:  Differentials include not limited to viral illness versus COVID versus pneumonia versus asthma exacerbation versus congestive heart failure versus acute coronary syndrome  Review  Lactic acid 1.6  proBNP less than 36  Troponin 8 repeat troponin less than 6  Leukocytes 11.7  Hemoglobin 14.9  Glucose 273  Heart score 2  Electrolytes within normal limits except for chloride low at 96  Patient presented with shortness of breath cough.  Onset of symptoms 4 to 5 days.  She is in no acute distress.  Oxygen level 96% room air.  Patient does have  tachycardia reports history of this  Loop tachycardic noted on exam.  No sign of respiratory distress no use of accessory muscles or nasal flaring no pursed of breathing or tripod position noted.  Lung sounds diminished clear COVID test positive.  Chest x-ray showed no acute cardiopulmonary process.  Patient was PERC positive secondary to tachycardia.  No evidence of PE.  Cardiomegaly with low volume left lung bibasilar and left peripheral groundglass vaccinations noted.  Large geographic area of low-attenuation in the left lobe of the liver and relatively sharp border suggesting liver infarcts.  No consolidation or pleural effusion.  Abdominal exam is negative.  LFTs within normal except for ALT is elevated at 41.  No elevation white blood cell count patient is not anemic.  Lactic acid normal.  Cardiac enzymes within normal limits. EKG with sinus tachycardia 103 bpm, low voltage, normal axis, normal ST segment, and a slight diffuse flattening of the T waves per attending note.  No complaints of chest pain noted.  Glucose 273 with no signs of DKA.  Electrolytes within normal limits except for chloride low at 96.  Patient is in no acute distress.  Impression COVID-19 shortness of breath elevated blood pressure of close follow-up with primary care physician.  Discharge per attending note.  Patient seen evaluated with attending physician Dr. Hein   Patient amenable to discharge    PROCEDURES  Unless otherwise noted below, none      CONSULTS:   None      Diagnoses as of 07/17/24 2305   Shortness of breath   COVID-19   Diastolic hypertension         This patient has remained hemodynamically stable during their ED course.      Critical Care: none       Counseling:  The emergency provider has spoken with the patient and discussed today’s results, in addition to providing specific details for the plan of care and counseling regarding the diagnosis and prognosis.  Questions are answered at this time and they are agreeable  with the plan.         --------------------------------- IMPRESSION AND DISPOSITION ---------------------------------    IMPRESSION  1. Shortness of breath    2. COVID-19    3. Diastolic hypertension        DISPOSITION  Disposition: Discharge home  Patient condition is stable improved        NOTE: This report was transcribed using voice recognition software. Every effort was made to ensure accuracy; however, inadvertent computerized transcription errors may be present      ALEXANDER Mendez-DORA  07/17/24 [id number]

## 2024-07-17 NOTE — PROGRESS NOTES
Attestation/Supervisory note for DORA Felix      The patient is a 54-year-old female presenting to the emergency department for evaluation of nonproductive cough, mild shortness of breath, generalized malaise and fatigue and some soreness of her anterior chest wall.  She also reports that she has had a sore throat and thinks that she has laryngitis.  She states that she has had symptoms for the past 4 days.  She denies any sick contacts or recent travel.  No fever or chills.  No headache or visual changes.  No palpitations.  No diaphoresis.  No abdominal pain.  No nausea vomiting.  No diarrhea or constipation.  No urinary complaints.  No history of CAD or ACS.  No history of PE or DVT.  No focal weakness or numbness.  She states that she does have cerebral palsy.  All pertinent positives and negatives are recorded above.  All other systems reviewed and otherwise negative.  Vital signs with mild diastolic hypertension and tachycardia but otherwise within normal limits.  Physical exam with a well-nourished well-developed female in no acute distress.  HEENT exam within normal limits.  She has no evidence of airway compromise or respiratory distress.  Abdominal exam is benign.  She has no gross motor, neurologic or vascular deficits on exam.  Pulses are equal bilaterally.      EKG with sinus tachycardia 103 bpm, low voltage, normal axis, normal ST segment, and a slight diffuse flattening of the T waves      DuoNeb ordered       Diagnostic labs with mild electrolyte imbalance, positive COVID-19 test and mildly elevated ALT but otherwise unremarkable.      Initial troponin T of 8.  Repeat troponin T < 6      Heart score 1      CT angio chest for pulmonary embolism   Final Result   1.  No evidence of pulmonary embolism.   2. Cardiomegaly with low volume left lung. Bibasilar and left   perihilar ground-glass attenuation likely representing interstitial   edema and low-grade congestive heart failure.   3. Large geographic  areas of low attenuation in the left lobe of the   liver with relatively sharp border suggesting liver infarcts.   4. No consolidations or pleural effusion.                  MACRO:   None        Signed by: Basil Torres 7/17/2024 3:25 PM   Dictation workstation:   [id number]      XR chest 1 view   Final Result   No acute cardiopulmonary abnormality.        Signed by: Africa Cartwright 7/17/2024 1:20 PM   Dictation workstation:   [id number]           The patient does not have any evidence of ischemia on EKG or cardiac enzymes.  No events on telemetry.  She does not have any evidence of airway compromise or respiratory distress on exam.  She does not have any gross motor, neurologic or vascular deficits on exam.  Pulses are equal bilaterally.  CT angio chest shows no evidence of PE or dissection.  No evidence of pneumonia or pneumothorax.  There is some groundglass opacification.  There were some large geographic areas of low-attenuation in the left lobe of the liver suggesting possible previous liver infarcts.  No consolidations or pleural effusion.  Diagnostic labs show evidence of a positive COVID-19 test and mild electrolyte imbalance but otherwise unremarkable.  Suspect that her symptoms are due to her COVID-19 infection.      The patient was released in good condition.  She was instructed to follow-up with her primary care physician within 1 to 2 days for further management of her current symptoms.  She will return to the emergency department sooner with worsening of symptoms or onset of new symptoms.  Rx given for albuterol and Mucinex.      Impression/diagnosis:  Chest tightness, anterior chest wall  Nonproductive cough  Sore throat  Dyspnea  COVID-19      Critical care time billing is not warranted at this time      I personally saw the patient and made/approve the management plan and take responsibility for the patient management.      I independently interpreted the following study (S) EKG and diagnostic  labs      I personally discussed the patient's management with the patient      I reviewed the results of the diagnostic labs and diagnostic imaging.  Formal radiology read was completed by the radiologist.      Kerry Hein MD

## 2024-07-18 LAB
ATRIAL RATE: 103 BPM
PR INTERVAL: 160 MS
Q ONSET: 216 MS
QRS COUNT: 17 BEATS
QRS DURATION: 76 MS
QT INTERVAL: 368 MS
QTC CALCULATION(BAZETT): 482 MS
QTC FREDERICIA: 440 MS
R AXIS: 50 DEGREES
T AXIS: 28 DEGREES
T OFFSET: 400 MS
VENTRICULAR RATE: 103 BPM

## 2024-07-22 LAB
BACTERIA BLD CULT: NORMAL
BACTERIA BLD CULT: NORMAL

## 2024-08-04 ENCOUNTER — APPOINTMENT (OUTPATIENT)
Dept: RADIOLOGY | Facility: HOSPITAL | Age: 54
End: 2024-08-04
Payer: COMMERCIAL

## 2024-08-04 ENCOUNTER — HOSPITAL ENCOUNTER (EMERGENCY)
Facility: HOSPITAL | Age: 54
Discharge: OTHER INSTITUTION | End: 2024-08-05
Attending: FAMILY MEDICINE
Payer: COMMERCIAL

## 2024-08-04 ENCOUNTER — APPOINTMENT (OUTPATIENT)
Dept: CARDIOLOGY | Facility: HOSPITAL | Age: 54
End: 2024-08-04
Payer: COMMERCIAL

## 2024-08-04 DIAGNOSIS — T83.511A URINARY TRACT INFECTION ASSOCIATED WITH INDWELLING URETHRAL CATHETER, INITIAL ENCOUNTER (CMS-HCC): Primary | ICD-10-CM

## 2024-08-04 DIAGNOSIS — N28.9 KIDNEY LESION, NATIVE, RIGHT: ICD-10-CM

## 2024-08-04 DIAGNOSIS — N39.0 URINARY TRACT INFECTION ASSOCIATED WITH INDWELLING URETHRAL CATHETER, INITIAL ENCOUNTER (CMS-HCC): Primary | ICD-10-CM

## 2024-08-04 DIAGNOSIS — T83.010A SUPRAPUBIC CATHETER DYSFUNCTION, INITIAL ENCOUNTER (CMS-HCC): ICD-10-CM

## 2024-08-04 LAB
ALBUMIN SERPL BCP-MCNC: 3.7 G/DL (ref 3.4–5)
ALP SERPL-CCNC: 121 U/L (ref 33–110)
ALT SERPL W P-5'-P-CCNC: 38 U/L (ref 7–45)
ANION GAP SERPL CALC-SCNC: 14 MMOL/L (ref 10–20)
APPEARANCE UR: ABNORMAL
AST SERPL W P-5'-P-CCNC: 24 U/L (ref 9–39)
BACTERIA #/AREA URNS AUTO: ABNORMAL /HPF
BASOPHILS # BLD AUTO: 0.05 X10*3/UL (ref 0–0.1)
BASOPHILS NFR BLD AUTO: 0.4 %
BILIRUB SERPL-MCNC: 0.4 MG/DL (ref 0–1.2)
BILIRUB UR STRIP.AUTO-MCNC: NEGATIVE MG/DL
BUN SERPL-MCNC: 13 MG/DL (ref 6–23)
CALCIUM SERPL-MCNC: 9 MG/DL (ref 8.6–10.3)
CARDIAC TROPONIN I PNL SERPL HS: 3 NG/L (ref 0–13)
CHLORIDE SERPL-SCNC: 96 MMOL/L (ref 98–107)
CO2 SERPL-SCNC: 27 MMOL/L (ref 21–32)
COLOR UR: COLORLESS
CREAT SERPL-MCNC: 0.64 MG/DL (ref 0.5–1.05)
EGFRCR SERPLBLD CKD-EPI 2021: >90 ML/MIN/1.73M*2
EOSINOPHIL # BLD AUTO: 0.13 X10*3/UL (ref 0–0.7)
EOSINOPHIL NFR BLD AUTO: 1.2 %
ERYTHROCYTE [DISTWIDTH] IN BLOOD BY AUTOMATED COUNT: 13.2 % (ref 11.5–14.5)
GLUCOSE SERPL-MCNC: 362 MG/DL (ref 74–99)
GLUCOSE UR STRIP.AUTO-MCNC: ABNORMAL MG/DL
HCT VFR BLD AUTO: 42.8 % (ref 36–46)
HGB BLD-MCNC: 14.2 G/DL (ref 12–16)
IMM GRANULOCYTES # BLD AUTO: 0.07 X10*3/UL (ref 0–0.7)
IMM GRANULOCYTES NFR BLD AUTO: 0.6 % (ref 0–0.9)
KETONES UR STRIP.AUTO-MCNC: NEGATIVE MG/DL
LEUKOCYTE ESTERASE UR QL STRIP.AUTO: ABNORMAL
LIPASE SERPL-CCNC: 4 U/L (ref 9–82)
LYMPHOCYTES # BLD AUTO: 3.27 X10*3/UL (ref 1.2–4.8)
LYMPHOCYTES NFR BLD AUTO: 29.2 %
MCH RBC QN AUTO: 30 PG (ref 26–34)
MCHC RBC AUTO-ENTMCNC: 33.2 G/DL (ref 32–36)
MCV RBC AUTO: 91 FL (ref 80–100)
MONOCYTES # BLD AUTO: 0.93 X10*3/UL (ref 0.1–1)
MONOCYTES NFR BLD AUTO: 8.3 %
MUCOUS THREADS #/AREA URNS AUTO: ABNORMAL /LPF
NEUTROPHILS # BLD AUTO: 6.74 X10*3/UL (ref 1.2–7.7)
NEUTROPHILS NFR BLD AUTO: 60.3 %
NITRITE UR QL STRIP.AUTO: NEGATIVE
NRBC BLD-RTO: 0 /100 WBCS (ref 0–0)
PH UR STRIP.AUTO: 7 [PH]
PLATELET # BLD AUTO: 464 X10*3/UL (ref 150–450)
POTASSIUM SERPL-SCNC: 4.3 MMOL/L (ref 3.5–5.3)
PROT SERPL-MCNC: 7.5 G/DL (ref 6.4–8.2)
PROT UR STRIP.AUTO-MCNC: NEGATIVE MG/DL
RBC # BLD AUTO: 4.73 X10*6/UL (ref 4–5.2)
RBC # UR STRIP.AUTO: ABNORMAL /UL
RBC #/AREA URNS AUTO: >20 /HPF
RBC MORPH BLD: NORMAL
SODIUM SERPL-SCNC: 133 MMOL/L (ref 136–145)
SP GR UR STRIP.AUTO: 1.01
SQUAMOUS #/AREA URNS AUTO: ABNORMAL /HPF
UROBILINOGEN UR STRIP.AUTO-MCNC: NORMAL MG/DL
WBC # BLD AUTO: 11.2 X10*3/UL (ref 4.4–11.3)
WBC #/AREA URNS AUTO: >50 /HPF
WBC CLUMPS #/AREA URNS AUTO: ABNORMAL /HPF

## 2024-08-04 PROCEDURE — 84075 ASSAY ALKALINE PHOSPHATASE: CPT | Performed by: PHYSICIAN ASSISTANT

## 2024-08-04 PROCEDURE — 83690 ASSAY OF LIPASE: CPT | Performed by: PHYSICIAN ASSISTANT

## 2024-08-04 PROCEDURE — 74177 CT ABD & PELVIS W/CONTRAST: CPT

## 2024-08-04 PROCEDURE — 74178 CT ABD&PLV WO CNTR FLWD CNTR: CPT | Performed by: RADIOLOGY

## 2024-08-04 PROCEDURE — 81001 URINALYSIS AUTO W/SCOPE: CPT | Performed by: PHYSICIAN ASSISTANT

## 2024-08-04 PROCEDURE — 99285 EMERGENCY DEPT VISIT HI MDM: CPT

## 2024-08-04 PROCEDURE — 36415 COLL VENOUS BLD VENIPUNCTURE: CPT | Performed by: PHYSICIAN ASSISTANT

## 2024-08-04 PROCEDURE — 96365 THER/PROPH/DIAG IV INF INIT: CPT

## 2024-08-04 PROCEDURE — 85025 COMPLETE CBC W/AUTO DIFF WBC: CPT | Performed by: PHYSICIAN ASSISTANT

## 2024-08-04 PROCEDURE — 2550000001 HC RX 255 CONTRASTS: Mod: SE | Performed by: PHYSICIAN ASSISTANT

## 2024-08-04 PROCEDURE — 87086 URINE CULTURE/COLONY COUNT: CPT | Mod: GENLAB | Performed by: PHYSICIAN ASSISTANT

## 2024-08-04 PROCEDURE — 2500000004 HC RX 250 GENERAL PHARMACY W/ HCPCS (ALT 636 FOR OP/ED): Mod: SE | Performed by: PHYSICIAN ASSISTANT

## 2024-08-04 PROCEDURE — 74176 CT ABD & PELVIS W/O CONTRAST: CPT

## 2024-08-04 PROCEDURE — 84484 ASSAY OF TROPONIN QUANT: CPT | Performed by: PHYSICIAN ASSISTANT

## 2024-08-04 PROCEDURE — 93005 ELECTROCARDIOGRAM TRACING: CPT

## 2024-08-04 RX ORDER — CEFTRIAXONE 1 G/50ML
1 INJECTION, SOLUTION INTRAVENOUS ONCE
Status: COMPLETED | OUTPATIENT
Start: 2024-08-04 | End: 2024-08-04

## 2024-08-04 RX ORDER — CEPHALEXIN 500 MG/1
500 CAPSULE ORAL 2 TIMES DAILY
Qty: 20 CAPSULE | Refills: 0 | Status: SHIPPED | OUTPATIENT
Start: 2024-08-04 | End: 2024-08-14

## 2024-08-04 ASSESSMENT — PAIN - FUNCTIONAL ASSESSMENT: PAIN_FUNCTIONAL_ASSESSMENT: 0-10

## 2024-08-04 ASSESSMENT — COLUMBIA-SUICIDE SEVERITY RATING SCALE - C-SSRS
6. HAVE YOU EVER DONE ANYTHING, STARTED TO DO ANYTHING, OR PREPARED TO DO ANYTHING TO END YOUR LIFE?: NO
2. HAVE YOU ACTUALLY HAD ANY THOUGHTS OF KILLING YOURSELF?: NO
1. IN THE PAST MONTH, HAVE YOU WISHED YOU WERE DEAD OR WISHED YOU COULD GO TO SLEEP AND NOT WAKE UP?: NO

## 2024-08-04 ASSESSMENT — PAIN SCALES - GENERAL: PAINLEVEL_OUTOF10: 2

## 2024-08-04 NOTE — ED TRIAGE NOTES
Pt has suprapubic catheter that is supposed to be changed every 2 weeks. Pt had her catheter changed 3 weeks ago, states her insurance stopped covering her supplies so she was not able to get it changed. Pt states she noticed some sediment in her urine starting yesterday. Pt also having bladder spasms

## 2024-08-04 NOTE — ED PROVIDER NOTES
"HPI   Chief Complaint   Patient presents with    Catheter issue       54-year-old female with past medical history positive for cerebral palsy-nonambulatory asthma IBS neuropathy to her hands and feet has a chronic suprapubic cath in place states if she does not have it changed every couple weeks it causes it to get plugged insurance will not cover the supplies so she is here to have her suprapubic cath changed    Noted to be tachycardic patient does states she normally is \"tachycardic\"  But does state her stomach appears to be slightly more distended than normal denies any abdominal pain normal bowel movement today    No fevers chills cough or congestion    Suprapubic cath changed by the nurse              Patient History   Past Medical History:   Diagnosis Date    Abnormal urine sediment 06/11/2019    Asthma ([facility])     Cellulitis of right leg 08/01/2019    Cerebral palsy (Multi)     Conjunctivitis 12/05/2023    Dyspnea 12/05/2023    Blanco catheter present 12/12/2020    Jaskaran hematuria 12/05/2023    Hemorrhagic cystitis 10/14/2021    Left ankle sprain 02/04/2020    Low back pain, unspecified 12/05/2023    Neuropathy     HANDS AND FEET    Osteoarthritis     Urinary pain 03/09/2020    Urinary tract infection 09/17/2020     Past Surgical History:   Procedure Laterality Date    OTHER SURGICAL HISTORY  07/13/2022    Back surgery    OTHER SURGICAL HISTORY  07/13/2022    Gallbladder surgery    OTHER SURGICAL HISTORY  07/20/2022    Suprapubic catheter insertion     No family history on file.  Social History     Tobacco Use    Smoking status: Never    Smokeless tobacco: Never   Vaping Use    Vaping status: Never Used   Substance Use Topics    Alcohol use: Never    Drug use: Never       Physical Exam   ED Triage Vitals [08/04/24 1802]   Temperature Heart Rate Respirations BP   36.3 °C (97.4 °F) (!) 116 18 (!) 115/98      Pulse Ox Temp Source Heart Rate Source Patient Position   (!) 93 % Temporal -- --      BP Location " FiO2 (%)     -- --       Physical Exam  Vitals and nursing note reviewed.   Constitutional:       General: She is not in acute distress.     Appearance: She is well-developed.   HENT:      Head: Normocephalic and atraumatic.      Right Ear: Tympanic membrane and ear canal normal.      Left Ear: Tympanic membrane and ear canal normal.      Nose: Nose normal.      Mouth/Throat:      Mouth: Mucous membranes are moist.   Eyes:      Conjunctiva/sclera: Conjunctivae normal.   Cardiovascular:      Rate and Rhythm: Normal rate and regular rhythm.      Heart sounds: No murmur heard.  Pulmonary:      Effort: Pulmonary effort is normal. No respiratory distress.      Breath sounds: Normal breath sounds.   Abdominal:      Palpations: Abdomen is soft.      Tenderness: There is no abdominal tenderness.      Comments: Suprapubic cath lower abdomen    Abdomen appears to be slightly distended nontender with palp  Patient is unsure if it is her normal or if it is a little worse   Musculoskeletal:         General: No swelling.      Cervical back: Neck supple.   Skin:     General: Skin is warm and dry.      Capillary Refill: Capillary refill takes less than 2 seconds.   Neurological:      Mental Status: She is alert.   Psychiatric:         Mood and Affect: Mood normal.           ED Course & MDM   Diagnoses as of 08/05/24 0537   Urinary tract infection associated with indwelling urethral catheter, initial encounter ([facility])   Suprapubic catheter dysfunction, initial encounter ([facility])   Kidney lesion, native, right                       [place] Coma Scale Score: 15                        Medical Decision Making  CT shows areas of abnormality/attenuation in the liver initial CT said was not on previous scan but I did review the CT of the chest scan that did show areas of low-attenuation in the left side of the liver this was done 3 weeks ago    Patient with some increasing abdominal distention and questionable she did want to have the  additional test/repeat CAT scan with IV contrast done for further eval    Also noted mildly low sodium of 133 did give her some fluid bolus here  Reported off to Dr. Walker , he will dispo patient once all testing is back    Suprapubic cath changed send urine post change to eval for any possible infectious process    Amount and/or Complexity of Data Reviewed  ECG/medicine tests: independent interpretation performed.     Details: EKG done at 6:12 PM shows sinus tach rate of 117 Axis normal QT/QTc 326/454        Procedure  Procedures     Ashleigh Medrano PA-C  08/04/24 1828       Ashleigh Medrano PA-C  08/04/24 2006       Christos Walker MD  08/05/24 0546

## 2024-08-05 ENCOUNTER — APPOINTMENT (OUTPATIENT)
Dept: RADIOLOGY | Facility: HOSPITAL | Age: 54
End: 2024-08-05
Payer: COMMERCIAL

## 2024-08-05 ENCOUNTER — HOSPITAL ENCOUNTER (OUTPATIENT)
Facility: HOSPITAL | Age: 54
Setting detail: OBSERVATION
Discharge: HOME | End: 2024-08-07
Attending: INTERNAL MEDICINE | Admitting: FAMILY MEDICINE
Payer: COMMERCIAL

## 2024-08-05 VITALS
BODY MASS INDEX: 37.79 KG/M2 | DIASTOLIC BLOOD PRESSURE: 79 MMHG | TEMPERATURE: 97.4 F | SYSTOLIC BLOOD PRESSURE: 128 MMHG | OXYGEN SATURATION: 95 % | HEART RATE: 102 BPM | HEIGHT: 58 IN | WEIGHT: 180 LBS | RESPIRATION RATE: 19 BRPM

## 2024-08-05 DIAGNOSIS — N28.1 RENAL CYST: ICD-10-CM

## 2024-08-05 DIAGNOSIS — E11.65 TYPE 2 DIABETES MELLITUS WITH HYPERGLYCEMIA, UNSPECIFIED WHETHER LONG TERM INSULIN USE (MULTI): Primary | ICD-10-CM

## 2024-08-05 DIAGNOSIS — E11.65 TYPE 2 DIABETES MELLITUS WITH HYPERGLYCEMIA, WITHOUT LONG-TERM CURRENT USE OF INSULIN (MULTI): ICD-10-CM

## 2024-08-05 PROBLEM — T83.511A URINARY TRACT INFECTION ASSOCIATED WITH INDWELLING URETHRAL CATHETER (CMS-HCC): Status: ACTIVE | Noted: 2020-09-17

## 2024-08-05 PROBLEM — N39.0 URINARY TRACT INFECTION ASSOCIATED WITH INDWELLING URETHRAL CATHETER (CMS-HCC): Status: ACTIVE | Noted: 2020-09-17

## 2024-08-05 PROBLEM — N28.9 KIDNEY LESION, NATIVE, RIGHT: Status: ACTIVE | Noted: 2024-08-05

## 2024-08-05 PROBLEM — N28.9 RENAL LESION: Status: ACTIVE | Noted: 2024-08-05

## 2024-08-05 PROBLEM — T83.010A SUPRAPUBIC CATHETER DYSFUNCTION (CMS-HCC): Status: ACTIVE | Noted: 2024-08-05

## 2024-08-05 LAB
ALBUMIN SERPL BCP-MCNC: 3.7 G/DL (ref 3.4–5)
ALP SERPL-CCNC: 130 U/L (ref 33–110)
ALT SERPL W P-5'-P-CCNC: 41 U/L (ref 7–45)
ANION GAP SERPL CALC-SCNC: 16 MMOL/L (ref 10–20)
AST SERPL W P-5'-P-CCNC: 31 U/L (ref 9–39)
ATRIAL RATE: 117 BPM
BILIRUB SERPL-MCNC: 0.4 MG/DL (ref 0–1.2)
BUN SERPL-MCNC: 8 MG/DL (ref 6–23)
CALCIUM SERPL-MCNC: 9.3 MG/DL (ref 8.6–10.3)
CHLORIDE SERPL-SCNC: 97 MMOL/L (ref 98–107)
CO2 SERPL-SCNC: 28 MMOL/L (ref 21–32)
CREAT SERPL-MCNC: 0.33 MG/DL (ref 0.5–1.05)
EGFRCR SERPLBLD CKD-EPI 2021: >90 ML/MIN/1.73M*2
ERYTHROCYTE [DISTWIDTH] IN BLOOD BY AUTOMATED COUNT: 13.2 % (ref 11.5–14.5)
GLUCOSE BLD MANUAL STRIP-MCNC: 231 MG/DL (ref 74–99)
GLUCOSE BLD MANUAL STRIP-MCNC: 276 MG/DL (ref 74–99)
GLUCOSE SERPL-MCNC: 264 MG/DL (ref 74–99)
HCT VFR BLD AUTO: 42.5 % (ref 36–46)
HGB BLD-MCNC: 14.2 G/DL (ref 12–16)
MAGNESIUM SERPL-MCNC: 1.86 MG/DL (ref 1.6–2.4)
MCH RBC QN AUTO: 29.8 PG (ref 26–34)
MCHC RBC AUTO-ENTMCNC: 33.4 G/DL (ref 32–36)
MCV RBC AUTO: 89 FL (ref 80–100)
NRBC BLD-RTO: 0 /100 WBCS (ref 0–0)
P AXIS: 50 DEGREES
P OFFSET: 202 MS
P ONSET: 148 MS
PLATELET # BLD AUTO: 411 X10*3/UL (ref 150–450)
POTASSIUM SERPL-SCNC: 4.2 MMOL/L (ref 3.5–5.3)
PR INTERVAL: 144 MS
PROT SERPL-MCNC: 6.9 G/DL (ref 6.4–8.2)
Q ONSET: 220 MS
QRS COUNT: 19 BEATS
QRS DURATION: 70 MS
QT INTERVAL: 326 MS
QTC CALCULATION(BAZETT): 454 MS
QTC FREDERICIA: 407 MS
R AXIS: 64 DEGREES
RBC # BLD AUTO: 4.76 X10*6/UL (ref 4–5.2)
SODIUM SERPL-SCNC: 137 MMOL/L (ref 136–145)
T AXIS: 31 DEGREES
T OFFSET: 383 MS
VENTRICULAR RATE: 117 BPM
WBC # BLD AUTO: 9.5 X10*3/UL (ref 4.4–11.3)

## 2024-08-05 PROCEDURE — 2500000004 HC RX 250 GENERAL PHARMACY W/ HCPCS (ALT 636 FOR OP/ED): Performed by: FAMILY MEDICINE

## 2024-08-05 PROCEDURE — 82947 ASSAY GLUCOSE BLOOD QUANT: CPT

## 2024-08-05 PROCEDURE — 36415 COLL VENOUS BLD VENIPUNCTURE: CPT | Performed by: FAMILY MEDICINE

## 2024-08-05 PROCEDURE — 83036 HEMOGLOBIN GLYCOSYLATED A1C: CPT | Mod: GEALAB | Performed by: FAMILY MEDICINE

## 2024-08-05 PROCEDURE — 83735 ASSAY OF MAGNESIUM: CPT | Performed by: FAMILY MEDICINE

## 2024-08-05 PROCEDURE — 2500000002 HC RX 250 W HCPCS SELF ADMINISTERED DRUGS (ALT 637 FOR MEDICARE OP, ALT 636 FOR OP/ED): Performed by: FAMILY MEDICINE

## 2024-08-05 PROCEDURE — 2500000001 HC RX 250 WO HCPCS SELF ADMINISTERED DRUGS (ALT 637 FOR MEDICARE OP): Mod: SE

## 2024-08-05 PROCEDURE — 2500000002 HC RX 250 W HCPCS SELF ADMINISTERED DRUGS (ALT 637 FOR MEDICARE OP, ALT 636 FOR OP/ED): Mod: SE | Performed by: FAMILY MEDICINE

## 2024-08-05 PROCEDURE — G0378 HOSPITAL OBSERVATION PER HR: HCPCS

## 2024-08-05 PROCEDURE — 99222 1ST HOSP IP/OBS MODERATE 55: CPT | Performed by: FAMILY MEDICINE

## 2024-08-05 PROCEDURE — 76770 US EXAM ABDO BACK WALL COMP: CPT

## 2024-08-05 PROCEDURE — 84075 ASSAY ALKALINE PHOSPHATASE: CPT | Performed by: FAMILY MEDICINE

## 2024-08-05 PROCEDURE — 76770 US EXAM ABDO BACK WALL COMP: CPT | Performed by: RADIOLOGY

## 2024-08-05 PROCEDURE — 96372 THER/PROPH/DIAG INJ SC/IM: CPT | Performed by: FAMILY MEDICINE

## 2024-08-05 PROCEDURE — 85027 COMPLETE CBC AUTOMATED: CPT | Performed by: FAMILY MEDICINE

## 2024-08-05 PROCEDURE — 2500000001 HC RX 250 WO HCPCS SELF ADMINISTERED DRUGS (ALT 637 FOR MEDICARE OP): Mod: SE | Performed by: FAMILY MEDICINE

## 2024-08-05 PROCEDURE — 2500000001 HC RX 250 WO HCPCS SELF ADMINISTERED DRUGS (ALT 637 FOR MEDICARE OP): Performed by: FAMILY MEDICINE

## 2024-08-05 RX ORDER — DEXTROSE 50 % IN WATER (D50W) INTRAVENOUS SYRINGE
12.5
Status: DISCONTINUED | OUTPATIENT
Start: 2024-08-05 | End: 2024-08-07 | Stop reason: HOSPADM

## 2024-08-05 RX ORDER — OXYBUTYNIN CHLORIDE 5 MG/1
5 TABLET ORAL 2 TIMES DAILY
Status: DISCONTINUED | OUTPATIENT
Start: 2024-08-05 | End: 2024-08-05

## 2024-08-05 RX ORDER — ONDANSETRON HYDROCHLORIDE 2 MG/ML
4 INJECTION, SOLUTION INTRAVENOUS EVERY 8 HOURS PRN
Status: DISCONTINUED | OUTPATIENT
Start: 2024-08-05 | End: 2024-08-07 | Stop reason: HOSPADM

## 2024-08-05 RX ORDER — CETIRIZINE HYDROCHLORIDE 10 MG/1
10 TABLET ORAL DAILY
Status: DISCONTINUED | OUTPATIENT
Start: 2024-08-06 | End: 2024-08-07 | Stop reason: HOSPADM

## 2024-08-05 RX ORDER — INSULIN LISPRO 100 [IU]/ML
0-10 INJECTION, SOLUTION INTRAVENOUS; SUBCUTANEOUS
Status: DISCONTINUED | OUTPATIENT
Start: 2024-08-05 | End: 2024-08-07 | Stop reason: HOSPADM

## 2024-08-05 RX ORDER — OXYBUTYNIN CHLORIDE 5 MG/1
5 TABLET ORAL 2 TIMES DAILY
Status: DISCONTINUED | OUTPATIENT
Start: 2024-08-05 | End: 2024-08-05 | Stop reason: HOSPADM

## 2024-08-05 RX ORDER — SODIUM CHLORIDE 9 MG/ML
75 INJECTION, SOLUTION INTRAVENOUS CONTINUOUS
Status: ACTIVE | OUTPATIENT
Start: 2024-08-05 | End: 2024-08-06

## 2024-08-05 RX ORDER — BACLOFEN 10 MG/1
TABLET ORAL
Status: COMPLETED
Start: 2024-08-05 | End: 2024-08-05

## 2024-08-05 RX ORDER — GABAPENTIN 300 MG/1
600 CAPSULE ORAL 3 TIMES DAILY
Status: DISCONTINUED | OUTPATIENT
Start: 2024-08-05 | End: 2024-08-07 | Stop reason: HOSPADM

## 2024-08-05 RX ORDER — NAPROXEN SODIUM 220 MG/1
81 TABLET, FILM COATED ORAL DAILY
Status: DISCONTINUED | OUTPATIENT
Start: 2024-08-05 | End: 2024-08-05 | Stop reason: HOSPADM

## 2024-08-05 RX ORDER — ACETAMINOPHEN 650 MG/1
650 SUPPOSITORY RECTAL EVERY 6 HOURS PRN
Status: DISCONTINUED | OUTPATIENT
Start: 2024-08-05 | End: 2024-08-07 | Stop reason: HOSPADM

## 2024-08-05 RX ORDER — ALBUTEROL SULFATE 90 UG/1
2 INHALANT RESPIRATORY (INHALATION) EVERY 6 HOURS PRN
Status: DISCONTINUED | OUTPATIENT
Start: 2024-08-05 | End: 2024-08-07 | Stop reason: HOSPADM

## 2024-08-05 RX ORDER — OXYBUTYNIN CHLORIDE 5 MG/1
5 TABLET ORAL 3 TIMES DAILY
Status: DISCONTINUED | OUTPATIENT
Start: 2024-08-05 | End: 2024-08-07 | Stop reason: HOSPADM

## 2024-08-05 RX ORDER — FAMOTIDINE 20 MG/1
40 TABLET, FILM COATED ORAL DAILY
Status: DISCONTINUED | OUTPATIENT
Start: 2024-08-06 | End: 2024-08-07 | Stop reason: HOSPADM

## 2024-08-05 RX ORDER — DICLOFENAC SODIUM 50 MG/1
50 TABLET, DELAYED RELEASE ORAL 2 TIMES DAILY
Status: DISCONTINUED | OUTPATIENT
Start: 2024-08-05 | End: 2024-08-05 | Stop reason: CLARIF

## 2024-08-05 RX ORDER — ONDANSETRON 4 MG/1
4 TABLET, ORALLY DISINTEGRATING ORAL EVERY 8 HOURS PRN
Status: DISCONTINUED | OUTPATIENT
Start: 2024-08-05 | End: 2024-08-07 | Stop reason: HOSPADM

## 2024-08-05 RX ORDER — CEPHALEXIN 500 MG/1
500 CAPSULE ORAL DAILY
Status: DISCONTINUED | OUTPATIENT
Start: 2024-08-05 | End: 2024-08-07 | Stop reason: HOSPADM

## 2024-08-05 RX ORDER — CETIRIZINE HYDROCHLORIDE 10 MG/1
10 TABLET ORAL DAILY
Status: DISCONTINUED | OUTPATIENT
Start: 2024-08-05 | End: 2024-08-05 | Stop reason: HOSPADM

## 2024-08-05 RX ORDER — BUPRENORPHINE 5 UG/H
1 PATCH TRANSDERMAL
COMMUNITY
Start: 2024-07-30

## 2024-08-05 RX ORDER — ASPIRIN 81 MG/1
81 TABLET ORAL DAILY
Status: DISCONTINUED | OUTPATIENT
Start: 2024-08-06 | End: 2024-08-07 | Stop reason: HOSPADM

## 2024-08-05 RX ORDER — FAMOTIDINE 20 MG/1
TABLET, FILM COATED ORAL
Status: COMPLETED
Start: 2024-08-05 | End: 2024-08-05

## 2024-08-05 RX ORDER — OXYCODONE AND ACETAMINOPHEN 5; 325 MG/1; MG/1
TABLET ORAL
Status: COMPLETED
Start: 2024-08-05 | End: 2024-08-05

## 2024-08-05 RX ORDER — SPIRONOLACTONE 25 MG/1
25 TABLET ORAL DAILY
Status: DISCONTINUED | OUTPATIENT
Start: 2024-08-06 | End: 2024-08-07 | Stop reason: HOSPADM

## 2024-08-05 RX ORDER — HEPARIN SODIUM 5000 [USP'U]/ML
7500 INJECTION, SOLUTION INTRAVENOUS; SUBCUTANEOUS EVERY 8 HOURS SCHEDULED
Status: DISCONTINUED | OUTPATIENT
Start: 2024-08-05 | End: 2024-08-07 | Stop reason: HOSPADM

## 2024-08-05 RX ORDER — FAMOTIDINE 20 MG/1
40 TABLET, FILM COATED ORAL 2 TIMES DAILY
Status: DISCONTINUED | OUTPATIENT
Start: 2024-08-05 | End: 2024-08-05 | Stop reason: HOSPADM

## 2024-08-05 RX ORDER — SPIRONOLACTONE 25 MG/1
25 TABLET ORAL DAILY
Status: DISCONTINUED | OUTPATIENT
Start: 2024-08-05 | End: 2024-08-05 | Stop reason: HOSPADM

## 2024-08-05 RX ORDER — DICLOFENAC SODIUM 25 MG/1
50 TABLET, DELAYED RELEASE ORAL 2 TIMES DAILY
Status: DISCONTINUED | OUTPATIENT
Start: 2024-08-05 | End: 2024-08-07 | Stop reason: HOSPADM

## 2024-08-05 RX ORDER — VENLAFAXINE HYDROCHLORIDE 37.5 MG/1
75 CAPSULE, EXTENDED RELEASE ORAL
Status: DISCONTINUED | OUTPATIENT
Start: 2024-08-05 | End: 2024-08-05 | Stop reason: HOSPADM

## 2024-08-05 RX ORDER — OXYCODONE AND ACETAMINOPHEN 5; 325 MG/1; MG/1
1 TABLET ORAL EVERY 8 HOURS PRN
Status: DISCONTINUED | OUTPATIENT
Start: 2024-08-05 | End: 2024-08-05 | Stop reason: HOSPADM

## 2024-08-05 RX ORDER — FERROUS SULFATE 325(65) MG
1 TABLET ORAL DAILY
Status: DISCONTINUED | OUTPATIENT
Start: 2024-08-06 | End: 2024-08-07 | Stop reason: HOSPADM

## 2024-08-05 RX ORDER — DOCUSATE SODIUM 100 MG/1
100 CAPSULE, LIQUID FILLED ORAL 2 TIMES DAILY
Status: DISCONTINUED | OUTPATIENT
Start: 2024-08-05 | End: 2024-08-07 | Stop reason: HOSPADM

## 2024-08-05 RX ORDER — FERROUS SULFATE 325(65) MG
65 TABLET ORAL
Status: DISCONTINUED | OUTPATIENT
Start: 2024-08-05 | End: 2024-08-05 | Stop reason: HOSPADM

## 2024-08-05 RX ORDER — PANTOPRAZOLE SODIUM 40 MG/1
40 TABLET, DELAYED RELEASE ORAL
Status: DISCONTINUED | OUTPATIENT
Start: 2024-08-06 | End: 2024-08-07 | Stop reason: HOSPADM

## 2024-08-05 RX ORDER — ACETAMINOPHEN 160 MG/5ML
650 SOLUTION ORAL EVERY 6 HOURS PRN
Status: DISCONTINUED | OUTPATIENT
Start: 2024-08-05 | End: 2024-08-07 | Stop reason: HOSPADM

## 2024-08-05 RX ORDER — METOPROLOL SUCCINATE 25 MG/1
25 TABLET, EXTENDED RELEASE ORAL DAILY
Status: DISCONTINUED | OUTPATIENT
Start: 2024-08-05 | End: 2024-08-05 | Stop reason: HOSPADM

## 2024-08-05 RX ORDER — BACLOFEN 10 MG/1
20 TABLET ORAL 3 TIMES DAILY
Status: DISCONTINUED | OUTPATIENT
Start: 2024-08-05 | End: 2024-08-05 | Stop reason: HOSPADM

## 2024-08-05 RX ORDER — NYSTATIN 100000 [USP'U]/G
POWDER TOPICAL 2 TIMES DAILY
Status: DISCONTINUED | OUTPATIENT
Start: 2024-08-05 | End: 2024-08-07 | Stop reason: HOSPADM

## 2024-08-05 RX ORDER — BACLOFEN 10 MG/1
20 TABLET ORAL 3 TIMES DAILY
Status: DISCONTINUED | OUTPATIENT
Start: 2024-08-05 | End: 2024-08-07 | Stop reason: HOSPADM

## 2024-08-05 RX ORDER — ACETAMINOPHEN 325 MG/1
650 TABLET ORAL EVERY 6 HOURS PRN
Status: DISCONTINUED | OUTPATIENT
Start: 2024-08-05 | End: 2024-08-07 | Stop reason: HOSPADM

## 2024-08-05 RX ORDER — DOXEPIN HYDROCHLORIDE 25 MG/1
50 CAPSULE ORAL NIGHTLY
Status: DISCONTINUED | OUTPATIENT
Start: 2024-08-05 | End: 2024-08-05 | Stop reason: HOSPADM

## 2024-08-05 RX ORDER — MONTELUKAST SODIUM 10 MG/1
10 TABLET ORAL NIGHTLY
Status: DISCONTINUED | OUTPATIENT
Start: 2024-08-05 | End: 2024-08-07 | Stop reason: HOSPADM

## 2024-08-05 RX ORDER — METOPROLOL SUCCINATE 25 MG/1
25 TABLET, EXTENDED RELEASE ORAL DAILY
Status: DISCONTINUED | OUTPATIENT
Start: 2024-08-06 | End: 2024-08-07 | Stop reason: HOSPADM

## 2024-08-05 RX ORDER — GABAPENTIN 300 MG/1
600 CAPSULE ORAL EVERY 8 HOURS SCHEDULED
Status: DISCONTINUED | OUTPATIENT
Start: 2024-08-05 | End: 2024-08-05 | Stop reason: HOSPADM

## 2024-08-05 RX ORDER — MONTELUKAST SODIUM 10 MG/1
10 TABLET ORAL NIGHTLY
Status: DISCONTINUED | OUTPATIENT
Start: 2024-08-05 | End: 2024-08-05 | Stop reason: HOSPADM

## 2024-08-05 RX ORDER — CYCLOBENZAPRINE HCL 5 MG
5 TABLET ORAL 3 TIMES DAILY PRN
Status: DISCONTINUED | OUTPATIENT
Start: 2024-08-05 | End: 2024-08-07 | Stop reason: HOSPADM

## 2024-08-05 RX ORDER — VENLAFAXINE HYDROCHLORIDE 75 MG/1
75 CAPSULE, EXTENDED RELEASE ORAL DAILY
Status: DISCONTINUED | OUTPATIENT
Start: 2024-08-06 | End: 2024-08-07 | Stop reason: HOSPADM

## 2024-08-05 RX ORDER — OXYCODONE AND ACETAMINOPHEN 5; 325 MG/1; MG/1
1.5 TABLET ORAL EVERY 8 HOURS PRN
Status: DISCONTINUED | OUTPATIENT
Start: 2024-08-05 | End: 2024-08-07 | Stop reason: HOSPADM

## 2024-08-05 RX ORDER — ATORVASTATIN CALCIUM 20 MG/1
20 TABLET, FILM COATED ORAL NIGHTLY
Status: DISCONTINUED | OUTPATIENT
Start: 2024-08-05 | End: 2024-08-07 | Stop reason: HOSPADM

## 2024-08-05 RX ORDER — DEXTROSE 50 % IN WATER (D50W) INTRAVENOUS SYRINGE
25
Status: DISCONTINUED | OUTPATIENT
Start: 2024-08-05 | End: 2024-08-07 | Stop reason: HOSPADM

## 2024-08-05 RX ORDER — DOXEPIN HYDROCHLORIDE 25 MG/1
50 CAPSULE ORAL NIGHTLY
Status: DISCONTINUED | OUTPATIENT
Start: 2024-08-05 | End: 2024-08-07 | Stop reason: HOSPADM

## 2024-08-05 RX ORDER — CEPHALEXIN 500 MG/1
500 CAPSULE ORAL 2 TIMES DAILY
Status: DISCONTINUED | OUTPATIENT
Start: 2024-08-05 | End: 2024-08-05

## 2024-08-05 RX ORDER — BUPRENORPHINE 5 UG/H
1 PATCH TRANSDERMAL
Status: DISCONTINUED | OUTPATIENT
Start: 2024-08-06 | End: 2024-08-07 | Stop reason: HOSPADM

## 2024-08-05 RX ORDER — GABAPENTIN 300 MG/1
CAPSULE ORAL
Status: COMPLETED
Start: 2024-08-05 | End: 2024-08-05

## 2024-08-05 SDOH — SOCIAL STABILITY: SOCIAL INSECURITY: DO YOU FEEL UNSAFE GOING BACK TO THE PLACE WHERE YOU ARE LIVING?: NO

## 2024-08-05 SDOH — SOCIAL STABILITY: SOCIAL INSECURITY: DO YOU FEEL ANYONE HAS EXPLOITED OR TAKEN ADVANTAGE OF YOU FINANCIALLY OR OF YOUR PERSONAL PROPERTY?: NO

## 2024-08-05 SDOH — SOCIAL STABILITY: SOCIAL INSECURITY: ARE YOU OR HAVE YOU BEEN THREATENED OR ABUSED PHYSICALLY, EMOTIONALLY, OR SEXUALLY BY ANYONE?: NO

## 2024-08-05 SDOH — SOCIAL STABILITY: SOCIAL INSECURITY: HAS ANYONE EVER THREATENED TO HURT YOUR FAMILY OR YOUR PETS?: NO

## 2024-08-05 SDOH — SOCIAL STABILITY: SOCIAL INSECURITY: ABUSE: ADULT

## 2024-08-05 SDOH — SOCIAL STABILITY: SOCIAL INSECURITY: HAVE YOU HAD THOUGHTS OF HARMING ANYONE ELSE?: NO

## 2024-08-05 SDOH — SOCIAL STABILITY: SOCIAL INSECURITY: HAVE YOU HAD ANY THOUGHTS OF HARMING ANYONE ELSE?: NO

## 2024-08-05 SDOH — SOCIAL STABILITY: SOCIAL INSECURITY: DOES ANYONE TRY TO KEEP YOU FROM HAVING/CONTACTING OTHER FRIENDS OR DOING THINGS OUTSIDE YOUR HOME?: NO

## 2024-08-05 SDOH — SOCIAL STABILITY: SOCIAL INSECURITY: WERE YOU ABLE TO COMPLETE ALL THE BEHAVIORAL HEALTH SCREENINGS?: YES

## 2024-08-05 SDOH — SOCIAL STABILITY: SOCIAL INSECURITY: ARE THERE ANY APPARENT SIGNS OF INJURIES/BEHAVIORS THAT COULD BE RELATED TO ABUSE/NEGLECT?: NO

## 2024-08-05 ASSESSMENT — PAIN SCALES - GENERAL
PAINLEVEL_OUTOF10: 0 - NO PAIN
PAINLEVEL_OUTOF10: 0 - NO PAIN
PAINLEVEL_OUTOF10: 7
PAINLEVEL_OUTOF10: 0 - NO PAIN
PAINLEVEL_OUTOF10: 0 - NO PAIN

## 2024-08-05 ASSESSMENT — COGNITIVE AND FUNCTIONAL STATUS - GENERAL
MOVING FROM LYING ON BACK TO SITTING ON SIDE OF FLAT BED WITH BEDRAILS: A LOT
TURNING FROM BACK TO SIDE WHILE IN FLAT BAD: TOTAL
TOILETING: TOTAL
MOBILITY SCORE: 8
TOILETING: TOTAL
STANDING UP FROM CHAIR USING ARMS: TOTAL
EATING MEALS: A LITTLE
DRESSING REGULAR UPPER BODY CLOTHING: TOTAL
TURNING FROM BACK TO SIDE WHILE IN FLAT BAD: TOTAL
PERSONAL GROOMING: TOTAL
MOVING TO AND FROM BED TO CHAIR: TOTAL
DAILY ACTIVITIY SCORE: 8
HELP NEEDED FOR BATHING: TOTAL
CLIMB 3 TO 5 STEPS WITH RAILING: TOTAL
PATIENT BASELINE BEDBOUND: YES
CLIMB 3 TO 5 STEPS WITH RAILING: TOTAL
MOVING TO AND FROM BED TO CHAIR: TOTAL
MOVING FROM LYING ON BACK TO SITTING ON SIDE OF FLAT BED WITH BEDRAILS: A LITTLE
DRESSING REGULAR UPPER BODY CLOTHING: TOTAL
WALKING IN HOSPITAL ROOM: TOTAL
MOBILITY SCORE: 7
PERSONAL GROOMING: TOTAL
HELP NEEDED FOR BATHING: TOTAL
STANDING UP FROM CHAIR USING ARMS: TOTAL
DRESSING REGULAR LOWER BODY CLOTHING: TOTAL
DAILY ACTIVITIY SCORE: 8
DRESSING REGULAR LOWER BODY CLOTHING: TOTAL
WALKING IN HOSPITAL ROOM: TOTAL
EATING MEALS: A LITTLE

## 2024-08-05 ASSESSMENT — LIFESTYLE VARIABLES
HOW MANY STANDARD DRINKS CONTAINING ALCOHOL DO YOU HAVE ON A TYPICAL DAY: PATIENT DOES NOT DRINK
HOW OFTEN DO YOU HAVE 6 OR MORE DRINKS ON ONE OCCASION: NEVER
AUDIT-C TOTAL SCORE: 0
SKIP TO QUESTIONS 9-10: 1
HOW OFTEN DO YOU HAVE A DRINK CONTAINING ALCOHOL: NEVER
AUDIT-C TOTAL SCORE: 0

## 2024-08-05 ASSESSMENT — ENCOUNTER SYMPTOMS
FEVER: 0
DIZZINESS: 0
CONSTIPATION: 0
HEADACHES: 0
CHILLS: 0
NAUSEA: 0
ABDOMINAL PAIN: 0
VOMITING: 0
SHORTNESS OF BREATH: 0
CHEST TIGHTNESS: 0
DYSURIA: 0
DIARRHEA: 0
COUGH: 0

## 2024-08-05 ASSESSMENT — ACTIVITIES OF DAILY LIVING (ADL)
HEARING - RIGHT EAR: FUNCTIONAL
LACK_OF_TRANSPORTATION: NO
PATIENT'S MEMORY ADEQUATE TO SAFELY COMPLETE DAILY ACTIVITIES?: YES
HEARING - LEFT EAR: FUNCTIONAL
LACK_OF_TRANSPORTATION: NO
ASSISTIVE_DEVICE: WHEELCHAIR;OTHER (COMMENT)
WALKS IN HOME: DEPENDENT
GROOMING: DEPENDENT
ADEQUATE_TO_COMPLETE_ADL: YES
JUDGMENT_ADEQUATE_SAFELY_COMPLETE_DAILY_ACTIVITIES: YES
DRESSING YOURSELF: DEPENDENT
BATHING: DEPENDENT
FEEDING YOURSELF: DEPENDENT
TOILETING: DEPENDENT

## 2024-08-05 ASSESSMENT — COLUMBIA-SUICIDE SEVERITY RATING SCALE - C-SSRS
2. HAVE YOU ACTUALLY HAD ANY THOUGHTS OF KILLING YOURSELF?: NO
6. HAVE YOU EVER DONE ANYTHING, STARTED TO DO ANYTHING, OR PREPARED TO DO ANYTHING TO END YOUR LIFE?: NO
1. IN THE PAST MONTH, HAVE YOU WISHED YOU WERE DEAD OR WISHED YOU COULD GO TO SLEEP AND NOT WAKE UP?: NO

## 2024-08-05 ASSESSMENT — PAIN - FUNCTIONAL ASSESSMENT
PAIN_FUNCTIONAL_ASSESSMENT: 0-10

## 2024-08-05 ASSESSMENT — PATIENT HEALTH QUESTIONNAIRE - PHQ9
2. FEELING DOWN, DEPRESSED OR HOPELESS: NOT AT ALL
SUM OF ALL RESPONSES TO PHQ9 QUESTIONS 1 & 2: 0
1. LITTLE INTEREST OR PLEASURE IN DOING THINGS: NOT AT ALL

## 2024-08-05 ASSESSMENT — PAIN DESCRIPTION - LOCATION: LOCATION: BACK

## 2024-08-05 NOTE — CARE PLAN
The patient's goals for the shift include      The clinical goals for the shift include patient remain safe during shift      Problem: Pain - Adult  Goal: Verbalizes/displays adequate comfort level or baseline comfort level  Outcome: Progressing     Problem: Safety - Adult  Goal: Free from fall injury  Outcome: Progressing     Problem: Discharge Planning  Goal: Discharge to home or other facility with appropriate resources  Outcome: Progressing     Problem: Chronic Conditions and Co-morbidities  Goal: Patient's chronic conditions and co-morbidity symptoms are monitored and maintained or improved  Outcome: Progressing

## 2024-08-05 NOTE — PROGRESS NOTES
08/05/24 1230   Discharge Planning   Living Arrangements Alone   Support Systems Family members;Friends/neighbors;Home care staff   Assistance Needed Patient is from home alone, has cerebral palsy and is non-ambulatory. Patient is A&O X3 and on room air at baseline (currently requiring O2). Per patient she is WC bound at baseline (has a motorized WC), has a hospital bed at home and transfers from hospital bed to  with a lift. Patient states she has aide services twice daily for a total of 6-7 hours (2 hours in the AM and 4-5 hours in the evening). Patient states a nurse comes in to change her suprapubic catheter, should be changed 2X per month but insurance will only provide supplies for 1X per moth changes of suprapubic per patient. Patient denies any further home going needs at time of DC other than transport home. Patient does not currently have her motorized WC here with her so she will need a stretcher home.   Type of Residence Private residence   Number of Stairs to Enter Residence 0   Number of Stairs Within Residence 0   Do you have animals or pets at home? Yes   Type of Animals or Pets 1 dog, 1 cat, 2 Guinea pigs and 1 frog   Who is requesting discharge planning? Provider   Home or Post Acute Services None   Expected Discharge Disposition Home   Does the patient need discharge transport arranged? Yes   RoundTrip coordination needed? Yes   Has discharge transport been arranged? No   Financial Resource Strain   How hard is it for you to pay for the very basics like food, housing, medical care, and heating? Not very   Housing Stability   In the last 12 months, was there a time when you were not able to pay the mortgage or rent on time? N   At any time in the past 12 months, were you homeless or living in a shelter (including now)? N   Transportation Needs   In the past 12 months, has lack of transportation kept you from medical appointments or from getting medications? no   In the past 12 months, has lack  of transportation kept you from meetings, work, or from getting things needed for daily living? No

## 2024-08-05 NOTE — H&P
History Of Present Illness  Rita Sabillon is a 54 y.o. female with PMH of nonambulatory cerebral palsy with chronic suprapubic catheter, asthma, and IBS that presented to the ED to have suprapubic catheter exchanged. Patient reports she usually changes her catheter twice per month but her insurance stopped covering her supplies so she came to the ED for exchange. Patient reports that she is on daily cephalexin for prophylaxis and has been compliant with her medications without any urinary symptoms. She denies any other complaints at this time and reports she was sent to [facility] because of abnormal CT.    In the ED, vital signs were within normal limits. Laboratory analysis was unremarkable. CT abd/pelvis showed 1.5cm cyst in right kidney and ill-defined finding in liver so CT was repeated with contrast which showed possible new lesion in the right kidney and left hepatic lobe lesion stable in comparison and may be fatty infiltration. MRI was recommended for kidney evaluation but she has metal and is therefore unable, so sent for urology evaluation.      Past Medical History  She has a past medical history of Abnormal urine sediment (06/11/2019), Asthma ([facility]), Cellulitis of right leg (08/01/2019), Cerebral palsy ([facility]), Conjunctivitis (12/05/2023), Dyspnea (12/05/2023), Blanco catheter present (12/12/2020), Jaskaran hematuria (12/05/2023), Hemorrhagic cystitis (10/14/2021), Left ankle sprain (02/04/2020), Low back pain, unspecified (12/05/2023), Neuropathy, Osteoarthritis, Urinary pain (03/09/2020), and Urinary tract infection (09/17/2020).    Surgical History  She has a past surgical history that includes Other surgical history (07/13/2022); Other surgical history (07/13/2022); and Other surgical history (07/20/2022).     Social History  She reports that she has never smoked. She has never used smokeless tobacco. She reports that she does not drink alcohol and does not use drugs.    Family History  No family  history on file.     Allergies  Patient has no known allergies.    Review of Systems   Constitutional:  Negative for chills and fever.   HENT:  Negative for congestion.    Eyes:  Negative for visual disturbance.   Respiratory:  Negative for cough, chest tightness and shortness of breath.    Cardiovascular:  Negative for chest pain.   Gastrointestinal:  Negative for abdominal pain, constipation, diarrhea, nausea and vomiting.   Genitourinary:  Negative for dysuria.   Neurological:  Negative for dizziness and headaches.        Physical Exam  Constitutional: alert and oriented x 3, awake, cooperative, no acute distress  Skin: warm and dry  Head/Neck: Normocephalic, atraumatic  Eyes: clear sclera  ENMT: mucous membranes moist  Cardio: Regular rate and rhythm  Resp: CTA bilaterally, good respiratory effort  Gastrointestinal: Soft, nontender, nondistended, BSx4  Musculoskeletal: some contracture at baseline  Neuro: alert and oriented x 3  Psychological: Appropriate mood and behavior    Last Recorded Vitals  BP (!) 147/99   Pulse 106   Temp 36.3 °C (97.3 °F) (Temporal)   Resp 19   Wt 85.2 kg (187 lb 12.8 oz)   SpO2 96%     Relevant Results  Scheduled medications  [START ON 8/6/2024] aspirin, 81 mg, oral, Daily  atorvastatin, 20 mg, oral, Nightly  baclofen, 20 mg, oral, TID  cephalexin, 500 mg, oral, Daily  [START ON 8/6/2024] cetirizine, 10 mg, oral, Daily  diclofenac, 50 mg, oral, BID  docusate sodium, 100 mg, oral, BID  doxepin, 50 mg, oral, Nightly  [START ON 8/6/2024] famotidine, 40 mg, oral, Daily  [START ON 8/6/2024] ferrous sulfate (325 mg ferrous sulfate), 1 tablet, oral, Daily  gabapentin, 600 mg, oral, TID  heparin (porcine), 7,500 Units, subcutaneous, q8h FIFI  insulin lispro, 0-10 Units, subcutaneous, TID  [START ON 8/6/2024] metoprolol succinate XL, 25 mg, oral, Daily  montelukast, 10 mg, oral, Nightly  nystatin, , Topical, BID  oxybutynin, 5 mg, oral, TID  [START ON 8/6/2024] pantoprazole, 40 mg, oral,  Daily before breakfast  [START ON 8/6/2024] spironolactone, 25 mg, oral, Daily  [START ON 8/6/2024] venlafaxine XR, 75 mg, oral, Daily      Continuous medications  sodium chloride 0.9%, 75 mL/hr, Last Rate: 75 mL/hr (08/05/24 1623)      PRN medications  PRN medications: acetaminophen **OR** acetaminophen **OR** acetaminophen, albuterol, cyclobenzaprine, dextrose, dextrose, glucagon, glucagon, ondansetron ODT **OR** ondansetron, oxyCODONE-acetaminophen    Results for orders placed or performed during the hospital encounter of 08/05/24 (from the past 24 hour(s))   CBC   Result Value Ref Range    WBC 9.5 4.4 - 11.3 x10*3/uL    nRBC 0.0 0.0 - 0.0 /100 WBCs    RBC 4.76 4.00 - 5.20 x10*6/uL    Hemoglobin 14.2 12.0 - 16.0 g/dL    Hematocrit 42.5 36.0 - 46.0 %    MCV 89 80 - 100 fL    MCH 29.8 26.0 - 34.0 pg    MCHC 33.4 32.0 - 36.0 g/dL    RDW 13.2 11.5 - 14.5 %    Platelets 411 150 - 450 x10*3/uL   Comprehensive Metabolic Panel   Result Value Ref Range    Glucose 264 (H) 74 - 99 mg/dL    Sodium 137 136 - 145 mmol/L    Potassium 4.2 3.5 - 5.3 mmol/L    Chloride 97 (L) 98 - 107 mmol/L    Bicarbonate 28 21 - 32 mmol/L    Anion Gap 16 10 - 20 mmol/L    Urea Nitrogen 8 6 - 23 mg/dL    Creatinine 0.33 (L) 0.50 - 1.05 mg/dL    eGFR >90 >60 mL/min/1.73m*2    Calcium 9.3 8.6 - 10.3 mg/dL    Albumin 3.7 3.4 - 5.0 g/dL    Alkaline Phosphatase 130 (H) 33 - 110 U/L    Total Protein 6.9 6.4 - 8.2 g/dL    AST 31 9 - 39 U/L    Bilirubin, Total 0.4 0.0 - 1.2 mg/dL    ALT 41 7 - 45 U/L   Magnesium   Result Value Ref Range    Magnesium 1.86 1.60 - 2.40 mg/dL   POCT GLUCOSE   Result Value Ref Range    POCT Glucose 276 (H) 74 - 99 mg/dL     ECG 12 lead    Result Date: 8/5/2024  Sinus tachycardia Low voltage QRS Borderline ECG When compared with ECG of 17-JUL-2024 12:57, No significant change was found See ED provider note for full interpretation and clinical correlation Confirmed by Jazmin Huntley ([id number]) on 8/5/2024 5:34:55 PM    CT  abdomen pelvis w IV contrast    Result Date: 8/4/2024  Interpreted By:  Haile Nazario, STUDY: CT ABDOMEN PELVIS W IV CONTRAST;  8/4/2024 8:40 pm   INDICATION: Signs/Symptoms:liver abnormality on previous ct advised.   COMPARISON: CT 4/19/2019, CT chest 7/17/2024   ACCESSION NUMBER(S): [id number]   ORDERING CLINICIAN: BETH CAMPA   TECHNIQUE: Contiguous axial images of the abdomen and pelvis were obtained after the intravenous administration of  contrast. Coronal and sagittal reformatted images were obtained from the axial images.   FINDINGS: No basilar airspace disease. No pleural effusion.   The examination is limited secondary to patient body habitus, motion, and beam hardening artifact secondary thoracolumbar fusion hardware.   There is hepatic steatosis. There are geographic areas of low attenuation in the left hepatic lobe which are new in comparison to the 4/19/2019 CT abdomen/pelvis examination, however stable in comparison to the 7/17/2024 CT chest examination and may correspond to geographic areas of fatty infiltration. The gallbladder is surgically absent. The portal and intrahepatic veins are grossly patent.   Fatty atrophy of the pancreas.   No splenomegaly.   The adrenal glands appear unremarkable.   Symmetric enhancement of the kidneys. 1.7 cm lesion in the anterior aspect of the right kidney demonstrates internal complexity/hypodensity inferiorly. No hydronephrosis.   No evidence of bowel obstruction.   Fat containing supraumbilical hernia.   Suprapubic urinary bladder catheter. There is underdistention versus urinary bladder wall thickening.   There is extensive fusion hardware       1.   1.7 cm lesion in the anterior right kidney demonstrates internal hyperdensity/complexity inferiorly which may correspond to layering hemorrhagic content, however follow-up renal protocol MRI is recommended to internal enhancement and neoplasm.   2.  Geographic areas of low attenuation in the left hepatic lobe  which are new in comparison to the 4/19/2019 CT abdomen/pelvis examination, however stable in comparison to the 7/17/2024 CT chest examination and may correspond to geographic areas of fatty infiltration.   3.   Fatty atrophy of the pancreas.   4.  Suprapubic urinary bladder catheter. Underdistention versus urinary bladder wall thickening; please correlate urinalysis to exclude cystitis.   MACRO: Critical Finding:  New lesion in the kidney. Notification was initiated on 8/4/2024 at 9:10 pm by  Haile Nazario.  (**-YCF-**) Instructions:  MR Abdomen w and wo IV contrast.   Signed by: Haile Nazario 8/4/2024 9:11 PM Dictation workstation:   [id number]    CT abdomen pelvis wo IV contrast    Result Date: 8/4/2024  Interpreted By:  Haiel Nazario, STUDY: CT ABDOMEN PELVIS WO IV CONTRAST;  8/4/2024 6:50 pm   INDICATION: Signs/Symptoms:abd pain bloating.   COMPARISON: 4/19/2019   ACCESSION NUMBER(S): [id number]   ORDERING CLINICIAN: BETH CAMPA   TECHNIQUE: Contiguous axial images of the abdomen and pelvis were obtained without intravenous contrast. Coronal and sagittal reformatted images were obtained from the axial images.   FINDINGS: No basilar airspace disease. No pleural effusion.   Evaluation of the abdominal viscera is limited secondary lack of intravenous contrast and beam hardening artifact secondary to thoracolumbar fusion hardware. Heterogeneity of the liver in the left hepatic lobe with areas of low attenuation. In the gallbladder surgically absent.   Diffuse fatty atrophy of the pancreas.   No splenomegaly.   The adrenal glands appear unremarkable.   No evidence of renal calculus or hydronephrosis. 1.5 cm cyst in the right kidney. No evidence of renal calculus or hydronephrosis.   Defect in the midline abdominal wall with fat containing hernia.   No evidence of bowel obstruction or acute appendicitis.   Suprapubic urinary bladder catheter. The urinary bladder is underdistended and not well evaluated.   Limited  evaluation of the uterus and adnexa.   Extensive thoracolumbar fusion hardware.       Evaluation of the liver is limited secondary to lack of intravenous contrast and beam hardening artifact, however there are heterogeneous areas of low attenuation in the left hepatic lobe which are not seen on the prior examination, and CT abdomen/pelvis with contrast is recommended for further evaluation to exclude mass or other acute pathology.   Diffuse fatty atrophy of the pancreas.   MACRO: None   Signed by: Haile Nazario 8/4/2024 7:46 PM Dictation workstation:   [id number]          Assessment/Plan   Principal Problem:    Renal lesion    53yo CF with PMH of nonambulatory cerebral palsy with chronic suprapubic catheter, asthma, IBS, and recent COVID19 that presented to the ED to have suprapubic catheter exchanged and was transferred urological evaluation of renal lesion.    Right kidney lesion  - not able to get MRI of kidney due to metal in back per patient  - urology consulted, rec renal ultrasound but can be outpt workup beyond that  - UA obtained from suprapubic catheter so not reliable to diagnose UTI    UTI ruled out  - no renal symptoms and no leukocytosis or left shift  - urine culture obtained in ED, can follow  - continue prophylactic cephalexin daily  - suprapubic catheter exchanged in ED 8/4    Hyperglycemia  - noted to have elevated BG and glucosuria  - pt denies hx of diabetes  - HbA1c ordered  - start on ISS due to sugars >300 in ED, did not receive insulin prior to transfer    CP, Asthma, Recent COVID19  - continue home inhalers  - pt already has full assistance and reports she feels comfortable with current plan at home, no need for PT/OT evaluation at this time    DVT Proph: SCDs    Dispo: Patient admitted to observation for imaging and urological evaluation who reports no further need inpt pending renal ultrasound, anticipate discharge tomorrow.    Unique Fong, DO

## 2024-08-05 NOTE — DISCHARGE INSTRUCTIONS
Patient transfer has been accepted by Dr. Aguiar, hospitalist at [facility] they will obtain Dr. Park urologist consult at arrival there.  Tomorrow she has been under care of Dr. Park group.  Patient agreed.  She has been on IV antibiotic.

## 2024-08-05 NOTE — PROGRESS NOTES
This patient was seen evaluated and treated by [facility] prior to my arrival, patient has suprapubic catheter, history of cerebral palsy and multiple other comorbidities she is being treated for UTI suprapubic catheter was replaced but still has symptoms as result CT of the abdomen pelvis done which showed right renal hemorrhagic lesion and MRI was recommended, patient has hardware in the spine due to back surgery and not able to have MRI done.  We do not have urology capability at this facility I recommended transfer to a facility with urologist.  To be evaluated while she is getting treatment for UTI.  Patient agreed with transfer to the [facility].  Dr. Barrios, hospitalist at [facility] accepted patient transfer.  Patient is waiting to be transferred she will need a urology consult at [facility].          Assessment/Plan   Active Problems:    Urinary tract infection associated with indwelling urethral catheter (CMS-HCC)    Kidney lesion, native, right    Suprapubic catheter dysfunction (CMS-HCC)           I spent 20 minutes in the professional and overall care of this patient.      Christos Walker MD

## 2024-08-06 ENCOUNTER — PHARMACY VISIT (OUTPATIENT)
Dept: PHARMACY | Facility: CLINIC | Age: 54
End: 2024-08-06
Payer: COMMERCIAL

## 2024-08-06 PROBLEM — I73.9 PERIPHERAL VASCULAR DISEASE, UNSPECIFIED (CMS-HCC): Status: ACTIVE | Noted: 2024-08-06

## 2024-08-06 PROBLEM — G89.4 CHRONIC PAIN SYNDROME: Status: RESOLVED | Noted: 2021-05-12 | Resolved: 2024-08-06

## 2024-08-06 PROBLEM — E11.65 TYPE 2 DIABETES MELLITUS WITH HYPERGLYCEMIA, WITHOUT LONG-TERM CURRENT USE OF INSULIN (MULTI): Status: ACTIVE | Noted: 2024-08-06

## 2024-08-06 LAB
ANION GAP SERPL CALC-SCNC: 12 MMOL/L (ref 10–20)
BACTERIA UR CULT: ABNORMAL
BUN SERPL-MCNC: 8 MG/DL (ref 6–23)
CALCIUM SERPL-MCNC: 9.1 MG/DL (ref 8.6–10.3)
CHLORIDE SERPL-SCNC: 103 MMOL/L (ref 98–107)
CO2 SERPL-SCNC: 28 MMOL/L (ref 21–32)
CREAT SERPL-MCNC: 0.26 MG/DL (ref 0.5–1.05)
EGFRCR SERPLBLD CKD-EPI 2021: >90 ML/MIN/1.73M*2
ERYTHROCYTE [DISTWIDTH] IN BLOOD BY AUTOMATED COUNT: 13.2 % (ref 11.5–14.5)
EST. AVERAGE GLUCOSE BLD GHB EST-MCNC: 255 MG/DL
GLUCOSE BLD MANUAL STRIP-MCNC: 194 MG/DL (ref 74–99)
GLUCOSE BLD MANUAL STRIP-MCNC: 215 MG/DL (ref 74–99)
GLUCOSE BLD MANUAL STRIP-MCNC: 228 MG/DL (ref 74–99)
GLUCOSE BLD MANUAL STRIP-MCNC: 231 MG/DL (ref 74–99)
GLUCOSE SERPL-MCNC: 214 MG/DL (ref 74–99)
HBA1C MFR BLD: 10.5 %
HCT VFR BLD AUTO: 41 % (ref 36–46)
HGB BLD-MCNC: 13.3 G/DL (ref 12–16)
MCH RBC QN AUTO: 30 PG (ref 26–34)
MCHC RBC AUTO-ENTMCNC: 32.4 G/DL (ref 32–36)
MCV RBC AUTO: 92 FL (ref 80–100)
NRBC BLD-RTO: 0 /100 WBCS (ref 0–0)
PLATELET # BLD AUTO: 370 X10*3/UL (ref 150–450)
POTASSIUM SERPL-SCNC: 3.8 MMOL/L (ref 3.5–5.3)
RBC # BLD AUTO: 4.44 X10*6/UL (ref 4–5.2)
SODIUM SERPL-SCNC: 139 MMOL/L (ref 136–145)
WBC # BLD AUTO: 9.6 X10*3/UL (ref 4.4–11.3)

## 2024-08-06 PROCEDURE — G0378 HOSPITAL OBSERVATION PER HR: HCPCS

## 2024-08-06 PROCEDURE — 82947 ASSAY GLUCOSE BLOOD QUANT: CPT

## 2024-08-06 PROCEDURE — 2500000004 HC RX 250 GENERAL PHARMACY W/ HCPCS (ALT 636 FOR OP/ED): Performed by: FAMILY MEDICINE

## 2024-08-06 PROCEDURE — 85027 COMPLETE CBC AUTOMATED: CPT | Performed by: FAMILY MEDICINE

## 2024-08-06 PROCEDURE — 2500000001 HC RX 250 WO HCPCS SELF ADMINISTERED DRUGS (ALT 637 FOR MEDICARE OP): Performed by: FAMILY MEDICINE

## 2024-08-06 PROCEDURE — 2500000002 HC RX 250 W HCPCS SELF ADMINISTERED DRUGS (ALT 637 FOR MEDICARE OP, ALT 636 FOR OP/ED): Performed by: FAMILY MEDICINE

## 2024-08-06 PROCEDURE — 99222 1ST HOSP IP/OBS MODERATE 55: CPT | Performed by: INTERNAL MEDICINE

## 2024-08-06 PROCEDURE — 96372 THER/PROPH/DIAG INJ SC/IM: CPT | Performed by: FAMILY MEDICINE

## 2024-08-06 PROCEDURE — 99222 1ST HOSP IP/OBS MODERATE 55: CPT | Performed by: UROLOGY

## 2024-08-06 PROCEDURE — 2500000002 HC RX 250 W HCPCS SELF ADMINISTERED DRUGS (ALT 637 FOR MEDICARE OP, ALT 636 FOR OP/ED): Performed by: INTERNAL MEDICINE

## 2024-08-06 PROCEDURE — 99239 HOSP IP/OBS DSCHRG MGMT >30: CPT | Performed by: STUDENT IN AN ORGANIZED HEALTH CARE EDUCATION/TRAINING PROGRAM

## 2024-08-06 PROCEDURE — 36415 COLL VENOUS BLD VENIPUNCTURE: CPT | Performed by: FAMILY MEDICINE

## 2024-08-06 PROCEDURE — 82310 ASSAY OF CALCIUM: CPT | Performed by: FAMILY MEDICINE

## 2024-08-06 PROCEDURE — RXMED WILLOW AMBULATORY MEDICATION CHARGE

## 2024-08-06 RX ORDER — DEXTROSE 4 G
TABLET,CHEWABLE ORAL
Qty: 1 EACH | Refills: 0 | OUTPATIENT
Start: 2024-08-06

## 2024-08-06 RX ORDER — METFORMIN HYDROCHLORIDE 500 MG/1
500 TABLET ORAL
Qty: 60 TABLET | Refills: 1 | Status: SHIPPED | OUTPATIENT
Start: 2024-08-06 | End: 2024-10-05

## 2024-08-06 RX ORDER — BLOOD-GLUCOSE,RECEIVER,CONT
EACH MISCELLANEOUS
Qty: 1 EACH | Refills: 0 | Status: SHIPPED | OUTPATIENT
Start: 2024-08-06

## 2024-08-06 RX ORDER — METFORMIN HYDROCHLORIDE 500 MG/1
500 TABLET ORAL
Status: DISCONTINUED | OUTPATIENT
Start: 2024-08-06 | End: 2024-08-07 | Stop reason: HOSPADM

## 2024-08-06 RX ORDER — LANCETS
EACH MISCELLANEOUS
Qty: 100 EACH | Refills: 0 | Status: SHIPPED | OUTPATIENT
Start: 2024-08-06

## 2024-08-06 RX ORDER — BLOOD-GLUCOSE SENSOR
EACH MISCELLANEOUS
Qty: 6 EACH | Refills: 0 | Status: SHIPPED | OUTPATIENT
Start: 2024-08-06

## 2024-08-06 RX ORDER — GLIMEPIRIDE 4 MG/1
2 TABLET ORAL
Status: DISCONTINUED | OUTPATIENT
Start: 2024-08-07 | End: 2024-08-07 | Stop reason: HOSPADM

## 2024-08-06 RX ORDER — GLIMEPIRIDE 2 MG/1
2 TABLET ORAL
Qty: 30 TABLET | Refills: 1 | Status: SHIPPED | OUTPATIENT
Start: 2024-08-07 | End: 2024-10-06

## 2024-08-06 ASSESSMENT — COGNITIVE AND FUNCTIONAL STATUS - GENERAL
DRESSING REGULAR LOWER BODY CLOTHING: TOTAL
EATING MEALS: A LITTLE
TURNING FROM BACK TO SIDE WHILE IN FLAT BAD: TOTAL
STANDING UP FROM CHAIR USING ARMS: TOTAL
WALKING IN HOSPITAL ROOM: TOTAL
DRESSING REGULAR UPPER BODY CLOTHING: TOTAL
PERSONAL GROOMING: TOTAL
HELP NEEDED FOR BATHING: TOTAL
DRESSING REGULAR LOWER BODY CLOTHING: TOTAL
PERSONAL GROOMING: TOTAL
DRESSING REGULAR UPPER BODY CLOTHING: TOTAL
MOVING FROM LYING ON BACK TO SITTING ON SIDE OF FLAT BED WITH BEDRAILS: A LOT
MOVING TO AND FROM BED TO CHAIR: TOTAL
TURNING FROM BACK TO SIDE WHILE IN FLAT BAD: TOTAL
WALKING IN HOSPITAL ROOM: TOTAL
TOILETING: TOTAL
EATING MEALS: A LITTLE
MOVING TO AND FROM BED TO CHAIR: TOTAL
CLIMB 3 TO 5 STEPS WITH RAILING: TOTAL
DAILY ACTIVITIY SCORE: 8
TOILETING: TOTAL
MOBILITY SCORE: 7
HELP NEEDED FOR BATHING: TOTAL
STANDING UP FROM CHAIR USING ARMS: TOTAL
MOVING FROM LYING ON BACK TO SITTING ON SIDE OF FLAT BED WITH BEDRAILS: A LOT
CLIMB 3 TO 5 STEPS WITH RAILING: TOTAL

## 2024-08-06 ASSESSMENT — PAIN SCALES - GENERAL: PAINLEVEL_OUTOF10: 0 - NO PAIN

## 2024-08-06 ASSESSMENT — ENCOUNTER SYMPTOMS
ABDOMINAL PAIN: 0
ENDOCRINE COMMENTS: AS ABOVE
WEAKNESS: 1
SHORTNESS OF BREATH: 0
UNEXPECTED WEIGHT CHANGE: 0

## 2024-08-06 NOTE — DISCHARGE SUMMARY
Discharge Diagnosis  Right renal cyst, new diagnosis of T2DM    Issues Requiring Follow-Up  Follow up with PCP in 1 week  Follow up with urology to monitor cyst  Started on metformin, glimepiride for DM    Discharge Meds     Your medication list        START taking these medications        Instructions Last Dose Given Next Dose Due   FreeStyle Hernandez 3 [place] misc  Generic drug: blood-glucose meter,continuous      Use as instructed       FreeStyle Hernandez 3 Sensor device  Generic drug: blood-glucose sensor      For continuous glucose monitoring.  Change every 14 days.       glimepiride 2 mg tablet  Commonly known as: Amaryl  Start taking on: August 7, 2024      Take 1 tablet (2 mg) by mouth once daily with breakfast. Do not fill before August 7, 2024.       metFORMIN 500 mg tablet  Commonly known as: Glucophage      Take 1 tablet (500 mg) by mouth 2 times a day before meals.              CHANGE how you take these medications        Instructions Last Dose Given Next Dose Due   Arnuity Ellipta 200 mcg/actuation inhaler  Generic drug: fluticasone furoate  What changed: See the new instructions.      INHALE ONE BLISTER WITH DEVICE ONCE A DAY              CONTINUE taking these medications        Instructions Last Dose Given Next Dose Due   albuterol 90 mcg/actuation inhaler      INHALE 2 PUFFS BY MOUTH EVERY 6 HOURS AS NEEDED FOR WHEEZING       albuterol 90 mcg/actuation inhaler      Inhale 2 puffs every 4 hours if needed for wheezing.       aspirin 81 mg EC tablet           atorvastatin 20 mg tablet  Commonly known as: Lipitor           baclofen 20 mg tablet  Commonly known as: Lioresal      1 TAB BY MOUTH THREE TIMES A DAY       buprenorphine 5 mcg/hour patch  Commonly known as: Butrans           cephalexin 500 mg capsule  Commonly known as: Keflex      Take 1 capsule (500 mg) by mouth 2 times a day for 10 days.       cetirizine 10 mg tablet  Commonly known as: ZyrTEC      1 TAB BY MOUTH ONCE A DAY       cyclobenzaprine 5  mg tablet  Commonly known as: Flexeril      1 TAB BY MOUTH EVERY 8 HOURS AS NEEDED       diclofenac 50 mg EC tablet  Commonly known as: Voltaren           docusate sodium 283 mg/5 mL enema  Commonly known as: Enemeez      1 RECTALLY ONCE A DAY       doxepin 50 mg capsule  Commonly known as: SINEquan      1 CAP BY MOUTH DAILY AT BEDTIME       ergocalciferol 1.25 MG (27419 UT) capsule  Commonly known as: Vitamin D-2      1 CAP BY MOUTH WEEKLY       famotidine 40 mg tablet  Commonly known as: Pepcid           FeroSuL tablet  Generic drug: ferrous sulfate (325 mg ferrous sulfate)      1 TAB BY MOUTH ONCE A DAY       fluconazole 150 mg tablet  Commonly known as: Diflucan           fluticasone 50 mcg/actuation nasal spray  Commonly known as: Flonase      2 SPRAYS NASALLY ONCE A DAY AS DIRECTED       gabapentin 600 mg tablet  Commonly known as: Neurontin      1 TAB BY MOUTH THREE TIMES A DAY       metoprolol succinate XL 25 mg 24 hr tablet  Commonly known as: Toprol-XL           montelukast 10 mg tablet  Commonly known as: Singulair      1 TAB BY MOUTH ONCE A DAY       Motegrity 2 mg tablet  Generic drug: prucalopride           Myrbetriq 25 mg tablet extended release 24 hr 24 hr tablet  Generic drug: mirabegron           naloxone 4 mg/0.1 mL nasal spray  Commonly known as: Narcan           naratriptan 2.5 mg tablet  Commonly known as: Amerge      1 TAB BY MOUTH AS NEEDED AT ONSET OF MIGRAINE.;MAY REPEAT DOSE ONCE AFTER 4 HOURS IF NEEDED       nystatin 100,000 unit/gram powder  Commonly known as: Mycostatin      APPLY TOPICALLY TO AFFECTED AREA TWICE A DAY AS DIRECTED       oxybutynin 5 mg tablet  Commonly known as: Ditropan           oxyCODONE-acetaminophen 7.5-325 mg tablet  Commonly known as: Percocet      Take 1 tablet by mouth every 8 hours if needed for severe pain (7 - 10). Do not fill before July 25, 2024.       pantoprazole 40 mg EC tablet  Commonly known as: ProtoNix           polyethylene glycol 17 gram/dose  powder  Commonly known as: Glycolax, Miralax           spironolactone 25 mg tablet  Commonly known as: Aldactone           SSD 1 % cream  Generic drug: silver sulfADIAZINE           venlafaxine XR 75 mg 24 hr capsule  Commonly known as: Effexor-XR      1 CAP BY MOUTH ONCE A DAY                 Where to Get Your Medications        These medications were sent to [facility]  [address] [place] [address]      Hours: 9 AM to 5 PM Mon-Fri Phone: [phone]   FreeStyle Hernandez 3 [place] misc  FreeStyle Hernandez 3 Sensor device  glimepiride 2 mg tablet  metFORMIN 500 mg tablet         Test Results Pending At Discharge  Pending Labs       No current pending labs.            Hospital Course   From [facility]:  Rita Sabillon is a 54 y.o. female with PMH of nonambulatory cerebral palsy with chronic suprapubic catheter, asthma, and IBS that presented to the ED to have suprapubic catheter exchanged. Patient reports she usually changes her catheter twice per month but her insurance stopped covering her supplies so she came to the ED for exchange. Patient reports that she is on daily cephalexin for prophylaxis and has been compliant with her medications without any urinary symptoms. She denies any other complaints at this time and reports she was sent to [facility] because of abnormal CT.     In the ED, vital signs were within normal limits. Laboratory analysis was unremarkable. CT abd/pelvis showed 1.5cm cyst in right kidney and ill-defined finding in liver so CT was repeated with contrast which showed possible new lesion in the right kidney and left hepatic lobe lesion stable in comparison and may be fatty infiltration. MRI was recommended for kidney evaluation but she has metal and is therefore unable, so sent for urology evaluation.  US shows right renal cyst, Urology will likely follow this outpatient.      New onset DM, A1C 10.5%.  Seen by endocrine who started her on oral anti-glycemics and sent in Freestyle  Hernandez.  Also seen by DM educator and RD.  On day of discharge, patient denied CP, SOB, n/v/diarrhea/constipation, was tolerating diet and ambulating without issue.  Patient appeared hemodynamically stable at time of discharge. Discussed with attending physician who agreed with discharge plan.      Pertinent Physical Exam At Time of Discharge  Physical Exam  Gen: NAD  Eyes:  EOM intact  ENT: MMM  Neck: No JVD  Respiratory: CTAB, no wheezes/rhonchi  Cardiac: RRR, no murmurs rubs or gallops  Abdomen: soft, NT, +BS  : SPC draining yellow urine to collection bag  Extremities: no edema or cyanosis  Neuro: No focal deficits, alert and oriented x 3  Psych:  appropriate mood and behavior  Outpatient Follow-Up  Future Appointments   Date Time Provider Department Center   8/14/2024  1:30 PM Jazmin Cortez, APRN-CNP [id number]         Ximena Rothmna PA-C

## 2024-08-06 NOTE — CONSULTS
Inpatient consult to Endocrinology  Consult performed by: Jared Dalton MD  Consult ordered by: Ximena Rothman PA-C      History Of Present Illness  Rita Sabillon is a 54 y.o. female admitted yesterday with renal mass, new diagnosis diabetes mellitus    History of prediabetes diagnosed 5 years ago    COVID-19 infection last month    Dry mouth  Denies polyuria.  Suprapubic catheter.     Patient is non-ambulatory due to cerebral palsy  Limited dexterity, no use of left hand  Preexisting neuropathy    Medications    Current Facility-Administered Medications:     acetaminophen (Tylenol) tablet 650 mg, 650 mg, oral, q6h PRN **OR** acetaminophen (Tylenol) oral liquid 650 mg, 650 mg, oral, q6h PRN **OR** acetaminophen (Tylenol) suppository 650 mg, 650 mg, rectal, q6h PRN, Unique P Ajit, DO    albuterol 90 mcg/actuation inhaler 2 puff, 2 puff, inhalation, q6h PRN, Unique P Ajit, DO    aspirin EC tablet 81 mg, 81 mg, oral, Daily, Unique P Ajit, DO, 81 mg at 08/06/24 0804    atorvastatin (Lipitor) tablet 20 mg, 20 mg, oral, Nightly, Unique P Ajit, DO, 20 mg at 08/05/24 2109    baclofen (Lioresal) tablet 20 mg, 20 mg, oral, TID, Unique P Ajit, DO, 20 mg at 08/06/24 0804    buprenorphine (Butrans) 5 mcg/hour patch 1 patch, 1 patch, transdermal, Every Tuesday, Unique P Ajit, DO    cephalexin (Keflex) capsule 500 mg, 500 mg, oral, Daily, Unique P Ajit, DO, 500 mg at 08/06/24 0803    cetirizine (ZyrTEC) tablet 10 mg, 10 mg, oral, Daily, Unique P Ajit, DO, 10 mg at 08/06/24 0804    cyclobenzaprine (Flexeril) tablet 5 mg, 5 mg, oral, TID PRN, Unique P Ajit, DO    dextrose 50 % injection 12.5 g, 12.5 g, intravenous, q15 min PRN, Unique P Ajit, DO    dextrose 50 % injection 25 g, 25 g, intravenous, q15 min PRN, Unique P Ajit, DO    diclofenac (Voltaren) EC tablet 50 mg, 50 mg, oral, BID, Unique P Ajit, DO, 50 mg at 08/06/24 0804    docusate sodium (Colace) capsule  100 mg, 100 mg, oral, BID, Unique P Ajit, DO, 100 mg at 08/06/24 0804    doxepin (SINEquan) capsule 50 mg, 50 mg, oral, Nightly, Unique P Ajit, DO, 50 mg at 08/05/24 2109    famotidine (Pepcid) tablet 40 mg, 40 mg, oral, Daily, Unique P Ajit, DO, 40 mg at 08/06/24 0804    ferrous sulfate (325 mg ferrous sulfate) tablet 1 tablet, 1 tablet, oral, Daily, Unique P Ajit, DO, 1 tablet at 08/06/24 0804    gabapentin (Neurontin) capsule 600 mg, 600 mg, oral, TID, Unique P Aijt, DO, 600 mg at 08/06/24 0804    glucagon (Glucagen) injection 1 mg, 1 mg, intramuscular, q15 min PRN, Unique P Ajit, DO    glucagon (Glucagen) injection 1 mg, 1 mg, intramuscular, q15 min PRN, Unique P Ajit, DO    heparin (porcine) injection 7,500 Units, 7,500 Units, subcutaneous, q8h FIFI, Unique P Ajit, DO, 7,500 Units at 08/06/24 0755    insulin lispro (HumaLOG) injection 0-10 Units, 0-10 Units, subcutaneous, TID, Unique P Ajit, DO, 4 Units at 08/06/24 1155    metoprolol succinate XL (Toprol-XL) 24 hr tablet 25 mg, 25 mg, oral, Daily, Unique P Ajit, DO, 25 mg at 08/06/24 0804    montelukast (Singulair) tablet 10 mg, 10 mg, oral, Nightly, Unique P Ajit, DO, 10 mg at 08/05/24 2108    nystatin (Mycostatin) 100,000 unit/gram powder, , Topical, BID, Unique P Ajit, DO, Given at 08/06/24 0900    ondansetron ODT (Zofran-ODT) disintegrating tablet 4 mg, 4 mg, oral, q8h PRN **OR** ondansetron (Zofran) injection 4 mg, 4 mg, intravenous, q8h PRN, Unique P Ajit, DO    oxybutynin (Ditropan) tablet 5 mg, 5 mg, oral, TID, Unique P Ajit, DO, 5 mg at 08/06/24 0804    oxyCODONE-acetaminophen (Percocet) 5-325 mg per tablet 1.5 tablet, 1.5 tablet, oral, q8h PRN, Unique P Ajit, DO    pantoprazole (ProtoNix) EC tablet 40 mg, 40 mg, oral, Daily before breakfast, Unique P Ajit, DO, 40 mg at 08/06/24 0626    spironolactone (Aldactone) tablet 25 mg, 25 mg, oral, Daily, Unique P Ajit, DO, 25  "mg at 08/06/24 0804    venlafaxine XR (Effexor-XR) 24 hr capsule 75 mg, 75 mg, oral, Daily, Unique Fong DO, 75 mg at 08/06/24 0803     Past Medical History  She has a past medical history of Abnormal urine sediment (06/11/2019), Asthma ([facility]), Cellulitis of right leg (08/01/2019), Cerebral palsy (Multi), Chronic pain syndrome (05/12/2021), Conjunctivitis (12/05/2023), Dyspnea (12/05/2023), Blanco catheter present (12/12/2020), Jaskaran hematuria (12/05/2023), Hemorrhagic cystitis (10/14/2021), Left ankle sprain (02/04/2020), Low back pain, unspecified (12/05/2023), Neuropathy, Osteoarthritis, Urinary pain (03/09/2020), and Urinary tract infection (09/17/2020).    Surgical History  She has a past surgical history that includes Other surgical history (07/13/2022); Other surgical history (07/13/2022); and Other surgical history (07/20/2022).     Social History  She reports that she has never smoked. She has never used smokeless tobacco. She reports that she does not drink alcohol and does not use drugs.    Family History  No family history on file.    Allergies  Patient has no known allergies.    Review of Systems   Constitutional:  Negative for unexpected weight change.   Eyes:  Negative for visual disturbance.   Respiratory:  Negative for shortness of breath.    Cardiovascular:  Negative for chest pain.   Gastrointestinal:  Negative for abdominal pain.   Endocrine:        As above   Genitourinary:         As above   Neurological:  Positive for weakness.        Spasticity         Last Recorded Vitals  Blood pressure 149/57, pulse 102, temperature 37.9 °C (100.2 °F), temperature source Temporal, resp. rate 18, height 1.448 m (4' 9\"), weight 85.2 kg (187 lb 12.8 oz), SpO2 93%.    Physical Exam  Constitutional:       General: She is not in acute distress.  HENT:      Head: Normocephalic.      Mouth/Throat:      Mouth: Mucous membranes are moist.   Eyes:      Extraocular Movements: Extraocular movements intact. "   Neck:      Thyroid: No thyromegaly.   Abdominal:      Tenderness: There is no abdominal tenderness.   Musculoskeletal:      Right foot: Deformity present.      Left foot: Deformity present.   Neurological:      Mental Status: She is alert.   Psychiatric:         Mood and Affect: Affect normal.          Relevant Results  Lab Results   Component Value Date    POCGLU 228 (H) 08/06/2024    POCGLU 215 (H) 08/06/2024    POCGLU 231 (H) 08/05/2024    POCGLU 276 (H) 08/05/2024    POCGLU 136 (H) 07/17/2024    GLUCOSE 214 (H) 08/06/2024    GLUCOSE 264 (H) 08/05/2024    GLUCOSE 362 (H) 08/04/2024    GLUCOSE 273 (H) 07/17/2024    GLUCOSE 233 (H) 10/08/2021      Latest Reference Range & Units 08/04/24 18:35 08/05/24 12:41 08/06/24 05:21   GLUCOSE 74 - 99 mg/dL 362 (H) 264 (H) 214 (H)   SODIUM 136 - 145 mmol/L 133 (L) 137 139   POTASSIUM 3.5 - 5.3 mmol/L 4.3 4.2 3.8   CHLORIDE 98 - 107 mmol/L 96 (L) 97 (L) 103   Bicarbonate 21 - 32 mmol/L 27 28 28   Anion Gap 10 - 20 mmol/L 14 16 12   Blood Urea Nitrogen 6 - 23 mg/dL 13 8 8   Creatinine 0.50 - 1.05 mg/dL 0.64 0.33 (L) 0.26 (L)   EGFR >60 mL/min/1.73m*2 >90 >90 >90   Calcium 8.6 - 10.3 mg/dL 9.0 9.3 9.1   Albumin 3.4 - 5.0 g/dL 3.7 3.7    Alkaline Phosphatase 33 - 110 U/L 121 (H) 130 (H)    ALT 7 - 45 U/L 38 41    AST 9 - 39 U/L 24 31    Bilirubin Total 0.0 - 1.2 mg/dL 0.4 0.4       Latest Reference Range & Units 08/05/24 12:41   Hemoglobin A1C see below % 10.5 (H)       IMPRESSION  TYPE 2 DIABETES MELLITUS WITH HYPERGLYCEMIA  Diabetes of unknown duration   Diabetic-range hyperglycemia last month when she presented with COVID-19 infection  Patient does not have the dexterity to do fingerstick testing    RECOMMENDATIONS  Continue lispro scale while admitted  Appreciate diabetes education  Appreciate dietitian consult  Metformin 500 mg BID with meals, advised side effects of bloating, diarrhea  Glimepiride 2 mg QAM, advised risk of hypoglycemia  Recommend FreeStyle Hernandez 3 for  glucose testing given inability to do fingerstick.  She has adequate assitance to replace the Hernandez sensor every 14 days.         Jared Dalton MD

## 2024-08-06 NOTE — DISCHARGE INSTR - DIET
Pt instructed to choose 3 Carb choices at meals, 1-2 Carb choices at snack; do not skip meals, eat every 4-5 hours. Bedtime snack if breakfast is longer than 12 hours. Call for any questions on diet after discharge.

## 2024-08-06 NOTE — PROGRESS NOTES
08/06/24 1602   Discharge Planning   [facility] Needed [facility] transport for 9:30 PM. Bedside nurse notified.

## 2024-08-06 NOTE — CONSULTS
"Reason For Consult  Diabetes Education    History Of Present Illness  Rita Sabillon is a 54 y.o. female presenting to the ED to have suprapubic catheter exchanged. .     Past Medical History  She has a past medical history of Abnormal urine sediment (06/11/2019), Asthma ([facility]), Cellulitis of right leg (08/01/2019), Cerebral palsy (Multi), Chronic pain syndrome (05/12/2021), Conjunctivitis (12/05/2023), Dyspnea (12/05/2023), Blanco catheter present (12/12/2020), Jaskaran hematuria (12/05/2023), Hemorrhagic cystitis (10/14/2021), Left ankle sprain (02/04/2020), Low back pain, unspecified (12/05/2023), Neuropathy, Osteoarthritis, Urinary pain (03/09/2020), and Urinary tract infection (09/17/2020).    Surgical History  She has a past surgical history that includes Other surgical history (07/13/2022); Other surgical history (07/13/2022); and Other surgical history (07/20/2022).     Social History  She reports that she has never smoked. She has never used smokeless tobacco. She reports that she does not drink alcohol and does not use drugs.    Family History  No family history on file.     Allergies  Patient has no known allergies.     Last Recorded Vitals  Blood pressure 149/57, pulse 102, temperature 37.9 °C (100.2 °F), temperature source Temporal, resp. rate 18, height 1.448 m (4' 9\"), weight 85.2 kg (187 lb 12.8 oz), SpO2 93%.    Relevant Results  A1C-10.5, [id number]     Assessment/Plan     Rita Sabillon is a 54 y.o. female, presented for suprapubic catheter exchanged. Admitted with renal mass, new diagnosis diabetes mellitus. Met with Rita to discuss her diabetes, educational opportunities, and possible recommendations that may be helpful in the care of her diabetes.    She reports that she has never smoked. She has never used smokeless tobacco. She reports that she does not drink alcohol and does not use drugs. Patient is non-ambulatory due to cerebral palsy. Limited dexterity, no use of left hand. " Preexisting neuropathy. Patient does not have the dexterity to do finger stick testing. Denies drinking regular soda or other sugary drinks. She states that she avoids sugar but does consume high carb foods.     Patient Teaching: We discussed common diabetes health complications that can occur when diabetes is uncontrolled. Carb counting education provided. The patient states that she should be able to adjust as she has always had to be mindful of ingredients and portion sizes d/t cerebral palsy. We reviewed and she can identify signs and symptoms of hypoglycemia and hyperglycemia. Expectation to record blood sugar levels before breakfast and 2 hours after dinner. Normal expected results and the importance of recording results for physician appointment follow-up. Medication education provided on metformin and glimepiride including dose, frequency, timing, side effects, and adverse reactions to watch for.         Refugio Black RN

## 2024-08-06 NOTE — CONSULTS
"Nutrition Assessment Note  Nutrition Assessment      Reason for Assessment  Reason for Assessment: Provider consult order (new onset DM diet education)    Anthropometrics:  Height: 144.8 cm (4' 9\")  Weight: 85.2 kg (187 lb 12.8 oz)  BMI (Calculated): 40.63          Education Documentation  DM and Carb Counting; 1500 calorie samplme 5 day menu; Label Reading Tips for Diabetes, taught by Nickie Tello RD at 8/6/2024  1:18 PM.  Learner: Patient  Readiness: Acceptance  Method: Explanation, Handout  Response: Verbalizes Understanding, Needs Reinforcement  Comment: Pt instructed to choose 3 Carb choices at meals, 1-2 Carb choices at snack; do not skip meals, eat every 4-5 hours. Bedtime snack if breakfast is longer than 12 hours. Call for any questions on diet after discharge.           Follow Up  Time Spent (min): 30 minutes  Last Date of Nutrition Visit: 08/06/24  Nutrition Follow-Up Needed?: Dietitian to reassess per policy       "

## 2024-08-06 NOTE — CARE PLAN
Problem: Skin  Goal: Decreased wound size/increased tissue granulation at next dressing change  Flowsheets (Taken 8/5/2024 4526)  Decreased wound size/increased tissue granulation at next dressing change: Protective dressings over bony prominences  Goal: Participates in plan/prevention/treatment measures  Flowsheets (Taken 8/5/2024 1252 by Jam Sanders RN)  Participates in plan/prevention/treatment measures: Elevate heels   The patient's goals for the shift include      The clinical goals for the shift include Pt. will remain free from S/S of infection throughout this shift.

## 2024-08-06 NOTE — PROGRESS NOTES
08/06/24 1400   Discharge Planning   Assistance Needed Patient cannot check her own blood sugar and informed this TCC that she isn't sure her aide is allowed to check blood sugars because they are not trained on it. Per pharmacy freestyle manjit needs prior auth and will likely be denied because she is discharging on insulin. MD and bedside nurse notified by TCC. Diabetic educator and endocrinology to establish a plan for blood sugar testing prior to DC.

## 2024-08-06 NOTE — PROGRESS NOTES
Rita Sabillon is a 54 y.o. female on day 0 of admission presenting with Renal lesion.      Subjective   Denies pain, CP, SOB.  Has never been diagnosed with DM but admits to not having her labs checked in a long time.  She tries to avoid sugary foods.  She is hoping to go home today.       Objective     Last Recorded Vitals  /66 (BP Location: Right arm, Patient Position: Lying)   Pulse 85   Temp 36.3 °C (97.3 °F) (Temporal)   Resp 18   Wt 85.2 kg (187 lb 12.8 oz)   SpO2 93%   Intake/Output last 3 Shifts:    Intake/Output Summary (Last 24 hours) at 8/6/2024 1024  Last data filed at 8/6/2024 0642  Gross per 24 hour   Intake 546.25 ml   Output 2900 ml   Net -2353.75 ml       Admission Weight  Weight: 85.2 kg (187 lb 12.8 oz) (08/05/24 1158)    Daily Weight  08/05/24 : 85.2 kg (187 lb 12.8 oz)    Image Results  US renal complete  Narrative: Interpreted By:  Sunil Almendarez,   STUDY:  US RENAL COMPLETE  8/5/2024 7:25 pm      INDICATION:  53 y/o   F with  Signs/Symptoms:lesion of right kidney seen on CT.      COMPARISON:  Correlation with CT scan from 08/04/2024.      ACCESSION NUMBER(S):  [id number]      ORDERING CLINICIAN:  DINORA MEDINA      TECHNIQUE:  Grayscale imaging and color Doppler were utilized.      FINDINGS:  RIGHT KIDNEY:  The right kidney measures 12.3 cm  in length. This is within the  limits of normal. There was normal right renal echogenicity.  No shadowing stone, hydronephrosis, or perinephric edema.  No gross right renal mass. There is an exophytic anteromedial lower  pole cyst measuring 18 x 12 x 14 mm. Adjacent to the right kidney,  the imaged liver showed increased echogenicity.          LEFT KIDNEY:  The left kidney measures 10.7 cm in length.. This is within the  limits of normal. There was normal left renal echogenicity.  No shadowing stone, hydronephrosis, or perinephric edema.  No gross left renal mass.      BLADDER:  Empty and collapsed.                  Impression:  Anteromedial lower pole right renal cyst.      Urinary bladder was empty and collapsed.      Remainder of the exam was negative.      MACRO:  None      Signed by: Sunil Almendarez 8/5/2024 8:49 PM  Dictation workstation:   [id number]  ECG 12 lead  Sinus tachycardia  Low voltage QRS  Borderline ECG  When compared with ECG of 17-JUL-2024 12:57,  No significant change was found  See ED provider note for full interpretation and clinical correlation  Confirmed by Jazmin Huntley ([id number]) on 8/5/2024 5:34:55 PM      Physical Exam  Physical Exam  Gen: NAD  Eyes:  EOM intact  ENT: MMM  Neck: No JVD  Respiratory: CTAB, no wheezes/rhonchi  Cardiac: RRR, no murmurs rubs or gallops  Abdomen: soft, NT, +BS  : SPC draining yellow urine to collection bag  Extremities: no edema or cyanosis  Neuro: No focal deficits, alert and oriented x 3  Psych:  appropriate mood and behavior    Assessment/Plan      Principal Problem:    Renal lesion  55yo CF with PMH of nonambulatory cerebral palsy with chronic suprapubic catheter, asthma, IBS, and recent COVID19 that presented to the ED to have suprapubic catheter exchanged and was transferred urological evaluation of renal lesion.     Right kidney lesion  - not able to get MRI of kidney due to metal in back per patient  - urology consulted, rec renal ultrasound but can be outpt workup beyond that  - UA obtained from suprapubic catheter so not reliable to diagnose UTI  - US shows right renal cyst  - Urology will likely follow this outpatient     UTI ruled out  - no renal symptoms and no leukocytosis or left shift  - urine culture obtained in ED, can follow  - continue prophylactic cephalexin daily  - suprapubic catheter exchanged in ED 8/4     New onset T2DM with Hyperglycemia  - noted to have elevated BG and glucosuria  - pt denies hx of diabetes  - HbA1c 10.5%  - continue ISS due to sugars >300 in ED, did not receive insulin prior to transfer  - Endocrinology consult  - Diabetic educator  consult  - RD consult     CP, Asthma, Recent COVID19  - continue home inhalers  - pt already has full assistance and reports she feels comfortable with current plan at home, no need for PT/OT evaluation at this time     Dispo:  endocrine consult for new DM, stable for discharge today  D/w Dr. Patricio Rothman, PA-C

## 2024-08-06 NOTE — CONSULTS
"Reason For Consult  Right renal lesion    History Of Present Illness  Rita Sabillon is a 54 y.o. female presenting with a neurogenic bladder, requiring change of her suprapubic tube.  Had a distended abdomen and a CT was ordered.  A 1.7 cm exophytic right renal lesion was identified on CT.  Her suprapubic tube is changed every 2 weeks.  She has no complaints today.  Her SP tube was changed in the emergency department.     Past Medical History  She has a past medical history of Abnormal urine sediment (06/11/2019), Asthma ([facility]), Cellulitis of right leg (08/01/2019), Cerebral palsy (Multi), Chronic pain syndrome (05/12/2021), Conjunctivitis (12/05/2023), Dyspnea (12/05/2023), Blanco catheter present (12/12/2020), Jaskaran hematuria (12/05/2023), Hemorrhagic cystitis (10/14/2021), Left ankle sprain (02/04/2020), Low back pain, unspecified (12/05/2023), Neuropathy, Osteoarthritis, Urinary pain (03/09/2020), and Urinary tract infection (09/17/2020).    Surgical History  She has a past surgical history that includes Other surgical history (07/13/2022); Other surgical history (07/13/2022); and Other surgical history (07/20/2022).     Social History  She reports that she has never smoked. She has never used smokeless tobacco. She reports that she does not drink alcohol and does not use drugs.    Family History  No family history on file.     Allergies  Patient has no known allergies.    Review of Systems  Neurogenic bladder     Physical Exam  Alert, oriented  Normal respiratory effort  Abdomen soft nontender non distended  Suprapubic tube draining clear     Last Recorded Vitals  Blood pressure 149/57, pulse 102, temperature 37.9 °C (100.2 °F), temperature source Temporal, resp. rate 18, height 1.448 m (4' 9\"), weight 85.2 kg (187 lb 12.8 oz), SpO2 93%.    Relevant Results      CT scan viewed     Assessment/Plan     54-year-old has a 1.7 cm exophytic lesion on the right kidney.  This was likely hemorrhagic cyst on CT.  "   Ultrasound was performed at [facility] and the lesion was consistent with a cyst.  Follow-up with repeat imaging recommended in 4 to 6 months.  She sees Dr. Sinha and we will see her in the office.            Nicolas Park MD

## 2024-08-07 VITALS
TEMPERATURE: 96.8 F | OXYGEN SATURATION: 95 % | SYSTOLIC BLOOD PRESSURE: 147 MMHG | RESPIRATION RATE: 18 BRPM | WEIGHT: 187.8 LBS | BODY MASS INDEX: 40.51 KG/M2 | HEIGHT: 57 IN | DIASTOLIC BLOOD PRESSURE: 99 MMHG | HEART RATE: 110 BPM

## 2024-08-07 LAB
ANION GAP SERPL CALC-SCNC: 16 MMOL/L (ref 10–20)
BUN SERPL-MCNC: 12 MG/DL (ref 6–23)
CALCIUM SERPL-MCNC: 8.8 MG/DL (ref 8.6–10.3)
CHLORIDE SERPL-SCNC: 101 MMOL/L (ref 98–107)
CO2 SERPL-SCNC: 25 MMOL/L (ref 21–32)
CREAT SERPL-MCNC: 0.31 MG/DL (ref 0.5–1.05)
EGFRCR SERPLBLD CKD-EPI 2021: >90 ML/MIN/1.73M*2
ERYTHROCYTE [DISTWIDTH] IN BLOOD BY AUTOMATED COUNT: 13.2 % (ref 11.5–14.5)
GLUCOSE BLD MANUAL STRIP-MCNC: 223 MG/DL (ref 74–99)
GLUCOSE SERPL-MCNC: 251 MG/DL (ref 74–99)
HCT VFR BLD AUTO: 42.8 % (ref 36–46)
HGB BLD-MCNC: 14.1 G/DL (ref 12–16)
MCH RBC QN AUTO: 29.9 PG (ref 26–34)
MCHC RBC AUTO-ENTMCNC: 32.9 G/DL (ref 32–36)
MCV RBC AUTO: 91 FL (ref 80–100)
NRBC BLD-RTO: 0 /100 WBCS (ref 0–0)
PLATELET # BLD AUTO: 389 X10*3/UL (ref 150–450)
POTASSIUM SERPL-SCNC: 4 MMOL/L (ref 3.5–5.3)
RBC # BLD AUTO: 4.72 X10*6/UL (ref 4–5.2)
SODIUM SERPL-SCNC: 138 MMOL/L (ref 136–145)
WBC # BLD AUTO: 10.7 X10*3/UL (ref 4.4–11.3)

## 2024-08-07 PROCEDURE — G0378 HOSPITAL OBSERVATION PER HR: HCPCS

## 2024-08-07 PROCEDURE — 85027 COMPLETE CBC AUTOMATED: CPT | Performed by: FAMILY MEDICINE

## 2024-08-07 PROCEDURE — 2500000001 HC RX 250 WO HCPCS SELF ADMINISTERED DRUGS (ALT 637 FOR MEDICARE OP): Performed by: FAMILY MEDICINE

## 2024-08-07 PROCEDURE — 82947 ASSAY GLUCOSE BLOOD QUANT: CPT

## 2024-08-07 PROCEDURE — 2500000002 HC RX 250 W HCPCS SELF ADMINISTERED DRUGS (ALT 637 FOR MEDICARE OP, ALT 636 FOR OP/ED): Performed by: INTERNAL MEDICINE

## 2024-08-07 PROCEDURE — 2500000002 HC RX 250 W HCPCS SELF ADMINISTERED DRUGS (ALT 637 FOR MEDICARE OP, ALT 636 FOR OP/ED): Performed by: FAMILY MEDICINE

## 2024-08-07 PROCEDURE — 2500000001 HC RX 250 WO HCPCS SELF ADMINISTERED DRUGS (ALT 637 FOR MEDICARE OP): Performed by: INTERNAL MEDICINE

## 2024-08-07 PROCEDURE — 96372 THER/PROPH/DIAG INJ SC/IM: CPT | Performed by: FAMILY MEDICINE

## 2024-08-07 PROCEDURE — 2500000004 HC RX 250 GENERAL PHARMACY W/ HCPCS (ALT 636 FOR OP/ED): Performed by: FAMILY MEDICINE

## 2024-08-07 PROCEDURE — 36415 COLL VENOUS BLD VENIPUNCTURE: CPT | Performed by: FAMILY MEDICINE

## 2024-08-07 PROCEDURE — 80048 BASIC METABOLIC PNL TOTAL CA: CPT | Performed by: FAMILY MEDICINE

## 2024-08-07 PROCEDURE — 99232 SBSQ HOSP IP/OBS MODERATE 35: CPT | Performed by: STUDENT IN AN ORGANIZED HEALTH CARE EDUCATION/TRAINING PROGRAM

## 2024-08-07 ASSESSMENT — ACTIVITIES OF DAILY LIVING (ADL): LACK_OF_TRANSPORTATION: NO

## 2024-08-07 ASSESSMENT — PAIN - FUNCTIONAL ASSESSMENT: PAIN_FUNCTIONAL_ASSESSMENT: 0-10

## 2024-08-07 ASSESSMENT — PAIN SCALES - GENERAL: PAINLEVEL_OUTOF10: 0 - NO PAIN
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
## 2024-08-07 NOTE — NURSING NOTE
0824 Discharge instructions reviewed with pt. All questions and concerns addressed. No IV present to remove.
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
## 2024-08-07 NOTE — NURSING NOTE
"2340 Patient requesting updated ETA for [facility] to pick her up, stating \"I need to call my caregiver and let her know when I will be coming home and I don't want to have to call in the middle of the night.\" Patient states she would rather leave in the morning now that it is so late. Spoke with [facility], new ETA 0200, asked them to reschedule for morning. New ETA now 08:30 am tomorrow.     2351 Dr Camacho notified and aware patient does not have IV access.  "

## 2024-08-08 ENCOUNTER — DOCUMENTATION (OUTPATIENT)
Dept: PRIMARY CARE | Facility: CLINIC | Age: 54
End: 2024-08-08
Payer: COMMERCIAL

## 2024-08-08 ENCOUNTER — PATIENT OUTREACH (OUTPATIENT)
Dept: PRIMARY CARE | Facility: CLINIC | Age: 54
End: 2024-08-08
Payer: COMMERCIAL

## 2024-08-08 NOTE — PROGRESS NOTES
Discharge Facility: [facility]  Discharge Diagnosis: Right renal cyst, new diagnosis of type 2 diabetes mellitus  Admission Date: 08/05/2024  Discharge Date: 08/07/2024    PCP Appointment Date: Tasked to office, no contact made  Specialist Appointment Date: Pain Med 08/14/2024  Hospital Encounter and Summary Linked: Yes      Two attempts were made to reach patient within two business days after discharge. Voicemail left with contact information for patient to call back with any non-emergent questions or concerns.

## 2024-08-12 DIAGNOSIS — R03.0 ELEVATED BLOOD PRESSURE READING: ICD-10-CM

## 2024-08-12 DIAGNOSIS — E78.5 HYPERLIPIDEMIA, UNSPECIFIED HYPERLIPIDEMIA TYPE: ICD-10-CM

## 2024-08-12 DIAGNOSIS — N32.89 BLADDER SPASM: ICD-10-CM

## 2024-08-12 DIAGNOSIS — I73.9 PERIPHERAL VASCULAR DISEASE, UNSPECIFIED (CMS-HCC): ICD-10-CM

## 2024-08-14 RX ORDER — METOPROLOL SUCCINATE 25 MG/1
25 TABLET, EXTENDED RELEASE ORAL DAILY
Qty: 28 TABLET | Refills: 11 | Status: SHIPPED | OUTPATIENT
Start: 2024-08-14

## 2024-08-14 RX ORDER — SILVER SULFADIAZINE 10 G/1000G
CREAM TOPICAL
Qty: 85 G | Refills: 11 | Status: SHIPPED | OUTPATIENT
Start: 2024-08-14

## 2024-08-14 RX ORDER — SPIRONOLACTONE 25 MG/1
25 TABLET ORAL DAILY
Qty: 28 TABLET | Refills: 11 | Status: SHIPPED | OUTPATIENT
Start: 2024-08-14

## 2024-08-14 RX ORDER — ATORVASTATIN CALCIUM 20 MG/1
20 TABLET, FILM COATED ORAL DAILY
Qty: 28 TABLET | Refills: 11 | Status: SHIPPED | OUTPATIENT
Start: 2024-08-14

## 2024-08-14 RX ORDER — OXYBUTYNIN CHLORIDE 5 MG/1
TABLET ORAL
Qty: 84 TABLET | Refills: 11 | Status: SHIPPED | OUTPATIENT
Start: 2024-08-14

## 2024-08-14 RX ORDER — ASPIRIN 81 MG/1
81 TABLET ORAL DAILY
Qty: 28 TABLET | Refills: 11 | Status: SHIPPED | OUTPATIENT
Start: 2024-08-14

## 2024-08-22 ENCOUNTER — OFFICE VISIT (OUTPATIENT)
Dept: PRIMARY CARE | Facility: CLINIC | Age: 54
End: 2024-08-22
Payer: COMMERCIAL

## 2024-08-22 VITALS
BODY MASS INDEX: 61.27 KG/M2 | HEIGHT: 57 IN | SYSTOLIC BLOOD PRESSURE: 124 MMHG | OXYGEN SATURATION: 93 % | WEIGHT: 284 LBS | DIASTOLIC BLOOD PRESSURE: 72 MMHG | HEART RATE: 88 BPM

## 2024-08-22 DIAGNOSIS — T83.511D URINARY TRACT INFECTION ASSOCIATED WITH INDWELLING URETHRAL CATHETER, SUBSEQUENT ENCOUNTER: ICD-10-CM

## 2024-08-22 DIAGNOSIS — N39.0 URINARY TRACT INFECTION ASSOCIATED WITH INDWELLING URETHRAL CATHETER, SUBSEQUENT ENCOUNTER: ICD-10-CM

## 2024-08-22 DIAGNOSIS — E11.65 TYPE 2 DIABETES MELLITUS WITH HYPERGLYCEMIA, WITHOUT LONG-TERM CURRENT USE OF INSULIN (MULTI): Primary | ICD-10-CM

## 2024-08-22 DIAGNOSIS — K21.9 GASTROESOPHAGEAL REFLUX DISEASE WITHOUT ESOPHAGITIS: ICD-10-CM

## 2024-08-22 PROCEDURE — 99213 OFFICE O/P EST LOW 20 MIN: CPT | Performed by: PHYSICIAN ASSISTANT

## 2024-08-22 PROCEDURE — 3008F BODY MASS INDEX DOCD: CPT | Performed by: PHYSICIAN ASSISTANT

## 2024-08-22 PROCEDURE — 3078F DIAST BP <80 MM HG: CPT | Performed by: PHYSICIAN ASSISTANT

## 2024-08-22 PROCEDURE — 3074F SYST BP LT 130 MM HG: CPT | Performed by: PHYSICIAN ASSISTANT

## 2024-08-22 PROCEDURE — 3046F HEMOGLOBIN A1C LEVEL >9.0%: CPT | Performed by: PHYSICIAN ASSISTANT

## 2024-08-22 ASSESSMENT — PATIENT HEALTH QUESTIONNAIRE - PHQ9
SUM OF ALL RESPONSES TO PHQ9 QUESTIONS 1 AND 2: 0
1. LITTLE INTEREST OR PLEASURE IN DOING THINGS: NOT AT ALL
2. FEELING DOWN, DEPRESSED OR HOPELESS: NOT AT ALL

## 2024-08-22 ASSESSMENT — ENCOUNTER SYMPTOMS
CHEST TIGHTNESS: 0
APPETITE CHANGE: 0
DIZZINESS: 0
POLYDIPSIA: 1
ACTIVITY CHANGE: 0
WOUND: 0
FREQUENCY: 0
LIGHT-HEADEDNESS: 0
SHORTNESS OF BREATH: 0

## 2024-08-22 ASSESSMENT — PAIN SCALES - GENERAL: PAINLEVEL: 0-NO PAIN

## 2024-08-22 ASSESSMENT — COLUMBIA-SUICIDE SEVERITY RATING SCALE - C-SSRS: 1. IN THE PAST MONTH, HAVE YOU WISHED YOU WERE DEAD OR WISHED YOU COULD GO TO SLEEP AND NOT WAKE UP?: NO

## 2024-08-22 NOTE — PROGRESS NOTES
"Subjective   Patient ID: Rita Sabillon is a 54 y.o. female who presents for Follow-up (Emergency room for Renal lesion./ [facility]).  Pt is here for hospital follow up with new dx of DM and kidney issues. Needed her cath replaced and found out she had an infection and Is an uncontrolled diabetic with an A1c of 10.5%.  Patient had been giving order several times to get blood work done but never did.  Last A1c was 3 years ago which was at 6%.  She was seen by Dr. Dalton in the hospital and was started on metformin and glimepiride.  She has been checking her blood sugars but is very difficult for her to do fingersticks.  She did have freestyle manjit 3 sensor sent in but currently her insurance does not cover it since she is not on insulin.  HPI     Review of Systems   Constitutional:  Negative for activity change and appetite change.   Eyes:  Negative for visual disturbance.   Respiratory:  Negative for chest tightness and shortness of breath.    Cardiovascular:  Negative for chest pain and leg swelling.   Endocrine: Positive for polydipsia and polyuria.   Genitourinary:  Negative for frequency and urgency.   Skin:  Negative for rash and wound.   Neurological:  Negative for dizziness, syncope and light-headedness.       Objective   /72   Pulse 88   Ht 1.448 m (4' 9\")   Wt 129 kg (284 lb)   LMP  (LMP Unknown)   SpO2 93%   BMI 61.46 kg/m²     Physical Exam  Constitutional:       Appearance: She is obese.      Comments: In motorized wheel chair   Cardiovascular:      Rate and Rhythm: Normal rate and regular rhythm.      Pulses: Normal pulses.   Pulmonary:      Effort: Pulmonary effort is normal.   Musculoskeletal:      Right lower leg: No edema.      Left lower leg: No edema.   Feet:      Right foot:      Skin integrity: No skin breakdown.      Left foot:      Skin integrity: No skin breakdown.   Neurological:      General: No focal deficit present.      Mental Status: She is alert. Mental status is at " baseline.   Psychiatric:         Mood and Affect: Mood normal.         Behavior: Behavior normal.         Thought Content: Thought content normal.         Judgment: Judgment normal.         Assessment/Plan   Diagnoses and all orders for this visit:  Type 2 diabetes mellitus with hyperglycemia, without long-term current use of insulin (Multi)  Gastroesophageal reflux disease without esophagitis  Urinary tract infection associated with indwelling urethral catheter, subsequent encounter    She will continue watching her diet. Discussed briefly how to adjust it. Gave her 2 samples of Dexcom 7 sensors to use for now. It is too difficult for her check sugars with a fingerstick. Will follow up with Dr. Dalton.

## 2024-08-27 ENCOUNTER — PATIENT OUTREACH (OUTPATIENT)
Dept: PRIMARY CARE | Facility: CLINIC | Age: 54
End: 2024-08-27
Payer: COMMERCIAL

## 2024-08-27 NOTE — PROGRESS NOTES
Call regarding appt. with PCP on 08/22/2024 after hospitalization.  At time of outreach call the patient feels as if their condition has improved since last visit.  Reviewed the PCP appointment with the pt and addressed any questions or concerns.

## 2024-08-29 ENCOUNTER — TELEPHONE (OUTPATIENT)
Dept: PRIMARY CARE | Facility: CLINIC | Age: 54
End: 2024-08-29
Payer: COMMERCIAL

## 2024-08-29 DIAGNOSIS — E11.65 TYPE 2 DIABETES MELLITUS WITH HYPERGLYCEMIA, WITHOUT LONG-TERM CURRENT USE OF INSULIN (MULTI): ICD-10-CM

## 2024-08-29 RX ORDER — GLIMEPIRIDE 2 MG/1
2 TABLET ORAL
Qty: 30 TABLET | Refills: 2 | Status: SHIPPED | OUTPATIENT
Start: 2024-08-29 | End: 2024-10-28

## 2024-08-29 NOTE — TELEPHONE ENCOUNTER
Refill Metformin 500 mg, and Glimepiride 2 mg to [facility]. Patient has a few left, almost out.

## 2024-09-05 ENCOUNTER — APPOINTMENT (OUTPATIENT)
Dept: PAIN MEDICINE | Facility: CLINIC | Age: 54
End: 2024-09-05
Payer: COMMERCIAL

## 2024-09-05 DIAGNOSIS — G80.0 SPASTIC QUADRIPLEGIC CEREBRAL PALSY (MULTI): Primary | ICD-10-CM

## 2024-09-05 RX ORDER — OXYCODONE AND ACETAMINOPHEN 5; 325 MG/1; MG/1
1 TABLET ORAL EVERY 8 HOURS PRN
Qty: 90 TABLET | Refills: 0 | Status: SHIPPED | OUTPATIENT
Start: 2024-09-05 | End: 2024-10-05

## 2024-09-05 RX ORDER — BUPRENORPHINE 5 UG/H
1 PATCH TRANSDERMAL
Qty: 4 PATCH | Refills: 0 | Status: SHIPPED | OUTPATIENT
Start: 2024-09-08

## 2024-09-05 RX ORDER — OXYCODONE AND ACETAMINOPHEN 5; 325 MG/1; MG/1
TABLET ORAL
COMMUNITY
End: 2024-09-05 | Stop reason: SDUPTHER

## 2024-09-05 NOTE — TELEPHONE ENCOUNTER
Patient must be seen for any additional refills after this and needs to attend her scheduled appt next week.

## 2024-09-10 ENCOUNTER — TELEPHONE (OUTPATIENT)
Dept: PRIMARY CARE | Facility: CLINIC | Age: 54
End: 2024-09-10
Payer: COMMERCIAL

## 2024-09-10 DIAGNOSIS — E11.65 TYPE 2 DIABETES MELLITUS WITH HYPERGLYCEMIA, WITHOUT LONG-TERM CURRENT USE OF INSULIN (MULTI): ICD-10-CM

## 2024-09-10 RX ORDER — GLIMEPIRIDE 2 MG/1
2 TABLET ORAL
Qty: 30 TABLET | Refills: 2 | OUTPATIENT
Start: 2024-09-10 | End: 2024-11-09

## 2024-09-11 ENCOUNTER — TELEPHONE (OUTPATIENT)
Dept: PRIMARY CARE | Facility: CLINIC | Age: 54
End: 2024-09-11
Payer: COMMERCIAL

## 2024-09-11 DIAGNOSIS — E11.65 TYPE 2 DIABETES MELLITUS WITH HYPERGLYCEMIA, WITHOUT LONG-TERM CURRENT USE OF INSULIN (MULTI): ICD-10-CM

## 2024-09-11 DIAGNOSIS — E11.65 TYPE 2 DIABETES MELLITUS WITH HYPERGLYCEMIA, UNSPECIFIED WHETHER LONG TERM INSULIN USE (MULTI): ICD-10-CM

## 2024-09-11 NOTE — TELEPHONE ENCOUNTER
Patient called back and said that her endocrinologist does not do refills. She is out of Glimepiride 2mg. Needs this sent to [facility] in [place]. Aware that [facility] is out the rest of today.

## 2024-09-12 ENCOUNTER — OFFICE VISIT (OUTPATIENT)
Dept: PAIN MEDICINE | Facility: CLINIC | Age: 54
End: 2024-09-12
Payer: COMMERCIAL

## 2024-09-12 VITALS
OXYGEN SATURATION: 96 % | HEIGHT: 57 IN | DIASTOLIC BLOOD PRESSURE: 86 MMHG | BODY MASS INDEX: 61.27 KG/M2 | WEIGHT: 284 LBS | SYSTOLIC BLOOD PRESSURE: 143 MMHG | HEART RATE: 102 BPM

## 2024-09-12 DIAGNOSIS — G80.0 SPASTIC QUADRIPARESIS SECONDARY TO CEREBRAL PALSY (MULTI): ICD-10-CM

## 2024-09-12 DIAGNOSIS — G80.0 SPASTIC QUADRIPLEGIC CEREBRAL PALSY (MULTI): ICD-10-CM

## 2024-09-12 DIAGNOSIS — G82.50 SPASTIC QUADRIPLEGIA (MULTI): Primary | ICD-10-CM

## 2024-09-12 PROCEDURE — 3077F SYST BP >= 140 MM HG: CPT | Performed by: NURSE PRACTITIONER

## 2024-09-12 PROCEDURE — 3008F BODY MASS INDEX DOCD: CPT | Performed by: NURSE PRACTITIONER

## 2024-09-12 PROCEDURE — 3046F HEMOGLOBIN A1C LEVEL >9.0%: CPT | Performed by: NURSE PRACTITIONER

## 2024-09-12 PROCEDURE — 99214 OFFICE O/P EST MOD 30 MIN: CPT | Performed by: NURSE PRACTITIONER

## 2024-09-12 PROCEDURE — 3079F DIAST BP 80-89 MM HG: CPT | Performed by: NURSE PRACTITIONER

## 2024-09-12 RX ORDER — BUPRENORPHINE 5 UG/H
1 PATCH TRANSDERMAL
Qty: 4 PATCH | Refills: 2 | Status: SHIPPED | OUTPATIENT
Start: 2024-10-04

## 2024-09-12 RX ORDER — OXYCODONE AND ACETAMINOPHEN 7.5; 325 MG/1; MG/1
1 TABLET ORAL EVERY 8 HOURS PRN
Qty: 90 TABLET | Refills: 0 | Status: SHIPPED | OUTPATIENT
Start: 2024-09-12 | End: 2024-09-12

## 2024-09-12 RX ORDER — OXYCODONE AND ACETAMINOPHEN 7.5; 325 MG/1; MG/1
1 TABLET ORAL EVERY 8 HOURS PRN
Qty: 90 TABLET | Refills: 0 | Status: SHIPPED | OUTPATIENT
Start: 2024-09-12 | End: 2024-10-12

## 2024-09-12 RX ORDER — OXYCODONE AND ACETAMINOPHEN 7.5; 325 MG/1; MG/1
1 TABLET ORAL EVERY 8 HOURS PRN
Qty: 90 TABLET | Refills: 0 | Status: SHIPPED | OUTPATIENT
Start: 2024-10-11 | End: 2024-11-10

## 2024-09-12 ASSESSMENT — PAIN - FUNCTIONAL ASSESSMENT: PAIN_FUNCTIONAL_ASSESSMENT: 0-10

## 2024-09-12 ASSESSMENT — PAIN DESCRIPTION - DESCRIPTORS: DESCRIPTORS: ACHING

## 2024-09-12 ASSESSMENT — PAIN SCALES - GENERAL
PAINLEVEL_OUTOF10: 7
PAINLEVEL: 7

## 2024-09-12 NOTE — PROGRESS NOTES
MEDICATION NAME: Oxycodone  STRENGTH: 5-325 mg  LAST FILL DATE: 90  DATE LAST TAKEN: 24  QUANTITY FILLED: 24  QUANTITY REMAININ  COUNT COMPLETED BY: JOHNNY AVINA MA and ANA LUISA BERGMAN    UDS LAST COMPLETED:   CONTROLLED SUBSTANCES AGREEMENT LAST SIGNED:   ORT LAST COMPLETED:  Modified Oswestry disability form filled out annually.    DESTROYED 75 PERCOCET 5-325MG TABLETS as this was an error.

## 2024-09-12 NOTE — PROGRESS NOTES
Subjective   Patient ID: Rita Sabillon is a 54 y.o. female who presents for Back Pain.    HPI 52 YO Female with a PMHx significant for cerebral palsy, asthma, chronic neck pain, muscle spasms cervicalgia and chronic pain syndrome. She presents for a pain management follow-up and medication refill.  Her current pain regimen consists of Percocet 7.5-325 mg every 8 hours as needed and Buprenorphine 5mcg/hr patch every 7 days. She reports that her pain is well-controlled. She has been able to participate in more ADLs as well as actually participate in some fun activities. She denies any adverse effects from the medication regimen.     Review of Systems Unless noted in the HPI all other systems have been reviewed and are negative for complaint.     Objective   Physical Exam  General- No acute distress, well appearing and well nourished. Obese  Eyes Conjunctiva and lids: No erythema, swelling or discharge  Neck - Supple, no cervical lymphadenopathy.   Pulmonary - Respiratory effort: Normal respiration.   Cardiovascular - Normal rate and rhythm.  Examination of extremities for edema and/or varicosities: No peripheral edema  Abdomen: Soft, Non-tender, non-distended, no abdominal masses.   Musculoskeletal - spine with reduced ROM; especially cervical area. Bilateral shoulders with significantly reduced ROM. BLE with atrophy noted.   Skin - Skin and subcutaneous tissue: Normal without rashes or lesions.  Neurologic - Abnormal gait; use of motorized wheelchair for mobility. A&O x3.   Psychiatric - Orientation to person, place, and time: Normal. Mood and affect: Normal.    Assessment/Plan   Assessment & Plan  Spastic quadriplegia (Multi)    Orders:    oxyCODONE-acetaminophen (Percocet) 7.5-325 mg tablet; Take 1 tablet by mouth every 8 hours if needed for severe pain (7 - 10). Do not fill before October 11, 2024.    buprenorphine (Butrans) 5 mcg/hour patch; Place 1 patch on the skin 1 (one) time per week. Do not fill before  October 4, 2024.    oxyCODONE-acetaminophen (Percocet) 7.5-325 mg tablet; Take 1 tablet by mouth every 8 hours if needed for severe pain (7 - 10). Destroyed 75 Percocet 5-325mg tablets in office as she was supposed to be 7.5-325mg. This was an error in transcribing. Please fill new RX today 9/12. Call office if any questions    Spastic quadriplegic cerebral palsy (Multi)    Orders:    oxyCODONE-acetaminophen (Percocet) 7.5-325 mg tablet; Take 1 tablet by mouth every 8 hours if needed for severe pain (7 - 10). Do not fill before October 11, 2024.    buprenorphine (Butrans) 5 mcg/hour patch; Place 1 patch on the skin 1 (one) time per week. Do not fill before October 4, 2024.    oxyCODONE-acetaminophen (Percocet) 7.5-325 mg tablet; Take 1 tablet by mouth every 8 hours if needed for severe pain (7 - 10). Destroyed 75 Percocet 5-325mg tablets in office as she was supposed to be 7.5-325mg. This was an error in transcribing. Please fill new RX today 9/12. 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 Do not fill before October 11, 2024.    buprenorphine (Butrans) 5 mcg/hour patch; Place 1 patch on the skin 1 (one) time per week. Do not fill before October 4, 2024.    oxyCODONE-acetaminophen (Percocet) 7.5-325 mg tablet; Take 1 tablet by mouth every 8 hours if needed for severe pain (7 - 10). Destroyed 75 Percocet 5-325mg tablets in office as she was supposed to be 7.5-325mg. This was an error in transcribing. Please fill new RX today 9/12. Call office if any questions    TREATMENT PLAN:  I had a nice discussion with the patient today and our plan will be as follows:  Radiology: No new imaging to review at this time.   Physically: Encouraged patient to continue with increased physical activity as able.   Psychologically: No acute  psychological needs at this time.    Medication: I will refill the patient's opioids today for [ 2 ] months.  The patient continues to see benefit and improvement in their quality of life and ability to maintain ADLs. Patient educated about the risks of taking opioids and operating a motor vehicle. Patient reports no adverse side effects to current medication regimen.  Current regimen does allow patient to maintain ADLs.  Patient reports no new neurologic symptoms, new pain areas, or exacerbation in pain today.  Patient reports they are happy with current treatment care path.    Patient has been educated on the risks, benefits, and alternatives of controlled substances as well as the proper way to store these medications. The patient and I discussed the nature of this medication and its side effects.  We discussed tolerance, physical dependence, psychological dependence, addiction and opioid-induced hyperalgesia.  We discussed the potential need to wean from this medication.  We discussed the availability of programs that can help with this process if necessary.  We discussed safety issues related to opioids including safe storage.  We discussed the fact that the patient should not drive an automobile or operate heavy machinery while taking this medication.  A prescription for naloxone was offered to the patient.  The patient will be re-evaluated for the need to continue opioid therapy in 60-90 days.  A PDMP report was reviewed today and was consistent with reported prescribing.  Tox screen is up-to-date and consistent with prescribing history.  Duration: Chronic/ongoing.  Intervention: Nothing at this time.

## 2024-09-12 NOTE — TELEPHONE ENCOUNTER
Spoke with patient. She did say their automated message as well as when she spoke to someone says they refused refills and don't do them in general. There was a script sent to [facility] on 8/29 for 30 days with 2 refills though so I asked her to contact them and see when the next delivery is.

## 2024-09-13 NOTE — ASSESSMENT & PLAN NOTE
Orders:    oxyCODONE-acetaminophen (Percocet) 7.5-325 mg tablet; Take 1 tablet by mouth every 8 hours if needed for severe pain (7 - 10). Do not fill before October 11, 2024.    buprenorphine (Butrans) 5 mcg/hour patch; Place 1 patch on the skin 1 (one) time per week. Do not fill before October 4, 2024.    oxyCODONE-acetaminophen (Percocet) 7.5-325 mg tablet; Take 1 tablet by mouth every 8 hours if needed for severe pain (7 - 10). Destroyed 75 Percocet 5-325mg tablets in office as she was supposed to be 7.5-325mg. This was an error in transcribing. Please fill new RX today 9/12. Call office if any questions

## 2024-09-18 ENCOUNTER — TELEPHONE (OUTPATIENT)
Dept: PRIMARY CARE | Facility: CLINIC | Age: 54
End: 2024-09-18
Payer: COMMERCIAL

## 2024-09-18 DIAGNOSIS — E11.65 TYPE 2 DIABETES MELLITUS WITH HYPERGLYCEMIA, WITHOUT LONG-TERM CURRENT USE OF INSULIN: ICD-10-CM

## 2024-09-18 NOTE — TELEPHONE ENCOUNTER
Patient's Endocrinologist will not refill Patient's Metformin and told Patient to have PCP refill medication. The original prescriber was the Hospital and they will not refill medication.    Refill needed for Metformin 500 MG Patient is totally out of Medication    Pharmacy is [facility] is [place]    Patient can be reached at [phone]

## 2024-09-19 RX ORDER — METFORMIN HYDROCHLORIDE 500 MG/1
500 TABLET ORAL
Qty: 60 TABLET | Refills: 1 | Status: SHIPPED | OUTPATIENT
Start: 2024-09-19 | End: 2024-11-18

## 2024-09-24 ENCOUNTER — TELEPHONE (OUTPATIENT)
Dept: PRIMARY CARE | Facility: CLINIC | Age: 54
End: 2024-09-24
Payer: COMMERCIAL

## 2024-09-24 ENCOUNTER — PATIENT OUTREACH (OUTPATIENT)
Dept: PRIMARY CARE | Facility: CLINIC | Age: 54
End: 2024-09-24
Payer: COMMERCIAL

## 2024-09-24 DIAGNOSIS — E11.65 TYPE 2 DIABETES MELLITUS WITH HYPERGLYCEMIA, UNSPECIFIED WHETHER LONG TERM INSULIN USE (MULTI): ICD-10-CM

## 2024-09-24 RX ORDER — LANCETS
EACH MISCELLANEOUS
Qty: 200 EACH | Refills: 11 | Status: SHIPPED | OUTPATIENT
Start: 2024-09-24

## 2024-09-24 RX ORDER — IBUPROFEN 200 MG
CAPSULE ORAL
Qty: 200 EACH | Refills: 11 | Status: SHIPPED | OUTPATIENT
Start: 2024-09-24

## 2024-09-24 NOTE — TELEPHONE ENCOUNTER
Patient called in, [phone], she needs her diabetic needles & test strips called into [facility]

## 2024-09-24 NOTE — TELEPHONE ENCOUNTER
[facility] has a question on test strips that say test twice a day and lancets that say test 3 times a day.  Please call  back to clarify at [phone].

## 2024-10-23 ENCOUNTER — PATIENT OUTREACH (OUTPATIENT)
Dept: PRIMARY CARE | Facility: CLINIC | Age: 54
End: 2024-10-23
Payer: COMMERCIAL

## 2024-11-02 DIAGNOSIS — J45.20 MILD INTERMITTENT ASTHMA WITHOUT COMPLICATION (HHS-HCC): Primary | ICD-10-CM

## 2024-11-02 DIAGNOSIS — G80.0 SPASTIC QUADRIPLEGIC CEREBRAL PALSY (MULTI): ICD-10-CM

## 2024-11-02 DIAGNOSIS — E11.65 TYPE 2 DIABETES MELLITUS WITH HYPERGLYCEMIA, WITHOUT LONG-TERM CURRENT USE OF INSULIN: ICD-10-CM

## 2024-11-02 DIAGNOSIS — G82.50 SPASTIC QUADRIPLEGIA (MULTI): ICD-10-CM

## 2024-11-02 DIAGNOSIS — G89.4 CHRONIC PAIN SYNDROME: ICD-10-CM

## 2024-11-05 RX ORDER — BACLOFEN 20 MG/1
TABLET ORAL
Qty: 84 TABLET | Refills: 10 | Status: SHIPPED | OUTPATIENT
Start: 2024-11-05

## 2024-11-05 RX ORDER — METFORMIN HYDROCHLORIDE 500 MG/1
TABLET ORAL
Qty: 56 TABLET | Refills: 10 | OUTPATIENT
Start: 2024-11-05

## 2024-11-05 RX ORDER — ALBUTEROL SULFATE 90 UG/1
2 INHALANT RESPIRATORY (INHALATION) EVERY 6 HOURS PRN
Qty: 8.5 G | Refills: 10 | Status: SHIPPED | OUTPATIENT
Start: 2024-11-05

## 2024-11-05 RX ORDER — DOXEPIN HYDROCHLORIDE 50 MG/1
CAPSULE ORAL
Qty: 28 CAPSULE | Refills: 10 | Status: SHIPPED | OUTPATIENT
Start: 2024-11-05

## 2024-11-05 RX ORDER — GABAPENTIN 600 MG/1
TABLET ORAL
Qty: 84 TABLET | Refills: 2 | Status: SHIPPED | OUTPATIENT
Start: 2024-11-05

## 2024-11-07 ENCOUNTER — APPOINTMENT (OUTPATIENT)
Dept: PAIN MEDICINE | Facility: CLINIC | Age: 54
End: 2024-11-07
Payer: COMMERCIAL

## 2024-11-11 NOTE — PROGRESS NOTES
[facility]  Follow Up Office Visit Note 2024    Patient Information: Rita Sabillon, MRN: [id number], : 1970   Primary Care/Referring Physician: Michelle Griggs PA-C, [address] [facility] [address] / [place]*     Chief Complaint: Neck pain  Interval History: At her last office visit no changes were made    Today she reports no changes since last seen. She continues to report improvement in pain and function with minimal side effects from current Percocet and Belbuca    Brief History of Pain: Ms. Rita Sabillon is a 54 y.o. female with a PMHx of cerebral palsy, asthma who presents today for follow up regarding chronic neck pain and muscle spasms.            Current Pain Medications: Percocet 7.5/325 mg q8h PRN, Butrans patch 5 mcg/hr, Baclofen 20 mg TID, Cyclobenzaprine 5 mg q8h PRN, Diclofenac 50 mg BID, Gabapentin 600 mg TID  Previously Tried Pain Medications:    Relevant Surgeries: Leg muscle release surgeries in the , extensive spine fusion in the   Injections: She reports getting injections in her neck in the past but they stopped working.  Physical/Occupational Therapy: The patient does not wish to pursue PT/OT at this time.    Medications:   Current Outpatient Medications   Medication Instructions    albuterol 90 mcg/actuation inhaler 2 puffs, inhalation, Every 6 hours PRN    albuterol 90 mcg/actuation inhaler 2 puffs, inhalation, Every 4 hours PRN    albuterol 90 mcg/actuation inhaler 2 puffs, inhalation, Every 6 hours PRN    Arnuity Ellipta 200 mcg/actuation inhaler INHALE ONE BLISTER WITH DEVICE ONCE A DAY    aspirin 81 mg, oral, Daily    atorvastatin (LIPITOR) 20 mg, oral, Daily    baclofen (Lioresal) 20 mg tablet 1 TAB BY MOUTH THREE TIMES A DAY    blood sugar diagnostic (Blood Glucose Test) strip Use to check your blood sugar twice daily    blood-glucose meter misc use to test blood sugar three times a day    [START ON 2024]  buprenorphine (Butrans) 5 mcg/hour patch 1 patch, transdermal, Once Weekly    cetirizine (ZYRTEC) 10 mg, oral, Daily    cyclobenzaprine (Flexeril) 5 mg tablet 1 TAB BY MOUTH EVERY 8 HOURS AS NEEDED    diclofenac (Voltaren) 50 mg EC tablet Take 1 tablet (50 mg) by mouth once daily.    docusate sodium (Enemeez) 283 mg/5 mL enema 1 RECTALLY ONCE A DAY    doxepin (SINEquan) 50 mg capsule 1 CAP BY MOUTH DAILY AT BEDTIME    ergocalciferol (VITAMIN D-2) 1,250 mcg, oral, Once Weekly    famotidine (Pepcid) 40 mg tablet Take 1 tablet (40 mg) by mouth 2 times a day.    FeroSuL tablet 1 tablet, oral, Daily    fluconazole (DIFLUCAN) 150 mg, Once    fluticasone (Flonase) 50 mcg/actuation nasal spray 2 SPRAYS NASALLY ONCE A DAY AS DIRECTED    FreeStyle Hernandez 3 [place] misc Use as instructed    FreeStyle Hernandez 3 Sensor device For continuous glucose monitoring.  Change every 14 days.    gabapentin (Neurontin) 600 mg tablet 1 TAB BY MOUTH THREE TIMES A DAY    glimepiride (AMARYL) 2 mg, oral, Daily with breakfast    lancets misc use to test blood sugar three times a day    metFORMIN (GLUCOPHAGE) 500 mg, oral, 2 times daily before meals    metoprolol succinate XL (TOPROL-XL) 25 mg, oral, Daily    montelukast (SINGULAIR) 10 mg, oral, Daily    Motegrity 2 mg, 2 times daily    Myrbetriq 25 mg tablet extended release 24 hr 24 hr tablet TAKE ONE TABLET BY MOUTH ONCE DAILY Oral for 30    naloxone (Narcan) 4 mg/0.1 mL nasal spray ADMINISTER A SINGLE SPRAY INTRANASALLY INTO ONE NOSTRIL. CALL 911. MAY REPEAT X 1.    naratriptan (Amerge) 2.5 mg tablet 1 TAB BY MOUTH AS NEEDED AT ONSET OF MIGRAINE.;MAY REPEAT DOSE ONCE AFTER 4 HOURS IF NEEDED    nystatin (Mycostatin) 100,000 unit/gram powder APPLY TOPICALLY TO AFFECTED AREA TWICE A DAY AS DIRECTED    oxybutynin (Ditropan) 5 mg tablet 1 TAB BY MOUTH THREE TIMES A DAY    [START ON 12/14/2024] oxyCODONE-acetaminophen (Percocet) 7.5-325 mg tablet 1 tablet, oral, Every 8 hours PRN    [START ON  11/14/2024] oxyCODONE-acetaminophen (Percocet) 7.5-325 mg tablet 1 tablet, oral, Every 8 hours PRN    pantoprazole (ProtoNix) 40 mg EC tablet Every 24 hours    polyethylene glycol (Glycolax, Miralax) 17 gram/dose powder     spironolactone (ALDACTONE) 25 mg, oral, Daily    SSD 1 % cream APPLY TO AFFECTED AREAS ONCE A DAY    venlafaxine XR (EFFEXOR-XR) 75 mg, oral, Daily      Allergies:   No Known Allergies      Past Medical & Surgical History:  Past Medical History:   Diagnosis Date    Abnormal urine sediment 06/11/2019    Asthma     Cellulitis of right leg 08/01/2019    Cerebral palsy     Chronic pain syndrome 05/12/2021    Conjunctivitis 12/05/2023    Dyspnea 12/05/2023    Blanco catheter present 12/12/2020    Jaskaran hematuria 12/05/2023    Hemorrhagic cystitis 10/14/2021    Left ankle sprain 02/04/2020    Low back pain, unspecified 12/05/2023    Neuropathy     HANDS AND FEET    Osteoarthritis     Type 2 diabetes mellitus     Urinary pain 03/09/2020    Urinary tract infection 09/17/2020      Past Surgical History:   Procedure Laterality Date    OTHER SURGICAL HISTORY  07/13/2022    Back surgery    OTHER SURGICAL HISTORY  07/13/2022    Gallbladder surgery    OTHER SURGICAL HISTORY  07/20/2022    Suprapubic catheter insertion       No family history on file.  Social History     Socioeconomic History    Marital status: Single     Spouse name: Not on file    Number of children: Not on file    Years of education: Not on file    Highest education level: Not on file   Occupational History    Not on file   Tobacco Use    Smoking status: Never    Smokeless tobacco: Never   Vaping Use    Vaping status: Never Used   Substance and Sexual Activity    Alcohol use: Never    Drug use: Never    Sexual activity: Not on file   Other Topics Concern    Not on file   Social History Narrative    Not on file     Social Drivers of Health     Financial Resource Strain: Low Risk  (8/7/2024)    Overall Financial Resource Strain (CARDIA)      "Difficulty of Paying Living Expenses: Not very hard   Food Insecurity: Not on file   Transportation Needs: No Transportation Needs (8/7/2024)    PRAPARE - Transportation     Lack of Transportation (Medical): No     Lack of Transportation (Non-Medical): No   Physical Activity: Not on file   Stress: Not on file   Social Connections: Not on file   Intimate Partner Violence: Not on file   Housing Stability: Low Risk  (8/7/2024)    Housing Stability Vital Sign     Unable to Pay for Housing in the Last Year: No     Number of Times Moved in the Last Year: 1     Homeless in the Last Year: No       Problems, Past medical history, past surgical history, Medications, allergies, social and family history reviewed and as per the electronic medical record from today's encounter    Review of Systems:  CONST: No fever, chills, fatigue, weight changes  EYES: No loss of vision  ENT: No hearing loss, tinnitus  CV: No chest pain, palpitations  RESP: No dyspnea, shortness of breath, cough  GI: No stool incontinence, nausea, vomiting  : No urinary incontinence  MSK: No joint swelling  SKIN: No rash, no hives  NEURO: No headache, dizziness  PSYCH: No anxiety, depression or suicidal ideation  HEM/LYMPH: No easy bruising or bleeding  All other systems reviewed are negative     Physical Exam:  Vitals: /66   Pulse 110   Resp 20   Ht 1.448 m (4' 9\")   Wt 129 kg (284 lb)   LMP  (LMP Unknown)   SpO2 97%   BMI 61.46 kg/m²   General: No apparent distress. Alert, appropriate, oriented x 3. Mood generally positive, affect congruent. Speaking in full sentences.   HENT: Normocephalic, atraumatic. Hearing intact.  Eyes: Pupils equal and round  Neck: Supple, trachea midline  Lungs: Symmetric respiratory excursion on visual exam, nonlabored breathing.   Extremities: No cyanosis noted in extremities.  Skin: No rashes, lesions noted.  Neuro: Alert and appropriate. Using a motorized wheelchair.    Laboratory Data:  The following laboratory " data were reviewed during this visit:   Lab Results   Component Value Date    WBC 10.7 2024    RBC 4.72 2024    HGB 14.1 2024    HCT 42.8 2024     2024      Lab Results   Component Value Date    INR 1.0 10/08/2021     Lab Results   Component Value Date    CREATININE 0.31 (L) 2024    HGBA1C 10.5 (H) 2024       Imaging:  The following imaging impressions were reviewed by me during this visit:    - Imagin CT cervical spine shows DDD at C5-C6 with no significant canal stenosis seen. Mild cervical spondylosis resulting in mild narrowing of left-sided neural foramina at C5-C6 and C6-C7. Postoperative changes of the included upper thoracic spine.    I also personally reviewed the images from the above studies myself. These images and my interpretation of them contributed to the management and decision making of the patient's medical plan.    ASSESSMENT:  Ms. Rita Sabillon is a 54 y.o. female with neck pain that is consistent with:    1. 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 Long-term current use of opiate analgesic        PLAN:  Radiology: No new diagnostics at this time    Physically: No changes at this time    Psychologically: No needs at this time    Medication: No changes to medication made today. Refills for Percocet and Butrans provided today.     Duration: Multiple years    Intervention: No plans for intervention.    I will refill the patient's opioids today for 2 month.  The patient continues to see benefit and improvement in their quality of life and ability to maintain ADLs.  Patient educated about the risks of taking opioids and operating a motor vehicle.  Patient reports no adverse side effects to current medication regimen.  Current regimen does allow patient to maintain ADLs.  Patient reports no new neurologic symptoms, new pain areas, or exacerbation in pain today.  Patient reports they are happy with current treatment care  path.    [place] was reviewed and was consistent with the history.    Patient has been educated on the risks, benefits, and alternatives of controlled substances as well as the proper way to store these medications.  The patient and I discussed the nature of this medication and its side effects.  We discussed tolerance, physical dependence, psychological dependence, addiction and opioid-induced hyperalgesia.  We discussed the potential need to wean from this medication.  We discussed the availability of programs that can help with this process if necessary.  We discussed safety issues related to opioids including safe storage.  We discussed the fact that the patient should not drive an automobile or operate heavy machinery while taking this medication.  A prescription for naloxone was offered to the patient.  The patient will be re-evaluated for the need to continue opioid therapy in 60-90 days.  A Pill Count of Percocet was completed today but she forgot her Butrans box. Reiterated that she needs to bring this to every visit. New oral toxicology ordered today      Sincerely,  Sunil Morrow MD  [facility] - [place]

## 2024-11-12 ENCOUNTER — OFFICE VISIT (OUTPATIENT)
Dept: PAIN MEDICINE | Facility: CLINIC | Age: 54
End: 2024-11-12
Payer: COMMERCIAL

## 2024-11-12 VITALS
RESPIRATION RATE: 20 BRPM | BODY MASS INDEX: 61.27 KG/M2 | SYSTOLIC BLOOD PRESSURE: 126 MMHG | OXYGEN SATURATION: 97 % | HEIGHT: 57 IN | HEART RATE: 110 BPM | DIASTOLIC BLOOD PRESSURE: 66 MMHG | WEIGHT: 284 LBS

## 2024-11-12 DIAGNOSIS — G80.0 SPASTIC QUADRIPARESIS SECONDARY TO CEREBRAL PALSY (MULTI): Primary | ICD-10-CM

## 2024-11-12 DIAGNOSIS — Z79.891 LONG-TERM CURRENT USE OF OPIATE ANALGESIC: ICD-10-CM

## 2024-11-12 PROCEDURE — 1036F TOBACCO NON-USER: CPT | Performed by: STUDENT IN AN ORGANIZED HEALTH CARE EDUCATION/TRAINING PROGRAM

## 2024-11-12 PROCEDURE — 3008F BODY MASS INDEX DOCD: CPT | Performed by: STUDENT IN AN ORGANIZED HEALTH CARE EDUCATION/TRAINING PROGRAM

## 2024-11-12 PROCEDURE — 3046F HEMOGLOBIN A1C LEVEL >9.0%: CPT | Performed by: STUDENT IN AN ORGANIZED HEALTH CARE EDUCATION/TRAINING PROGRAM

## 2024-11-12 PROCEDURE — 3078F DIAST BP <80 MM HG: CPT | Performed by: STUDENT IN AN ORGANIZED HEALTH CARE EDUCATION/TRAINING PROGRAM

## 2024-11-12 PROCEDURE — 99214 OFFICE O/P EST MOD 30 MIN: CPT | Performed by: STUDENT IN AN ORGANIZED HEALTH CARE EDUCATION/TRAINING PROGRAM

## 2024-11-12 PROCEDURE — 3074F SYST BP LT 130 MM HG: CPT | Performed by: STUDENT IN AN ORGANIZED HEALTH CARE EDUCATION/TRAINING PROGRAM

## 2024-11-12 PROCEDURE — 99417 PROLNG OP E/M EACH 15 MIN: CPT | Performed by: STUDENT IN AN ORGANIZED HEALTH CARE EDUCATION/TRAINING PROGRAM

## 2024-11-12 RX ORDER — BUPRENORPHINE 5 UG/H
1 PATCH TRANSDERMAL
Qty: 4 PATCH | Refills: 1 | Status: SHIPPED | OUTPATIENT
Start: 2024-11-14 | End: 2025-01-09

## 2024-11-12 RX ORDER — OXYCODONE AND ACETAMINOPHEN 7.5; 325 MG/1; MG/1
1 TABLET ORAL EVERY 8 HOURS PRN
Qty: 90 TABLET | Refills: 0 | Status: SHIPPED | OUTPATIENT
Start: 2024-11-14 | End: 2024-12-14

## 2024-11-12 RX ORDER — OXYCODONE AND ACETAMINOPHEN 7.5; 325 MG/1; MG/1
1 TABLET ORAL EVERY 8 HOURS PRN
Qty: 90 TABLET | Refills: 0 | Status: SHIPPED | OUTPATIENT
Start: 2024-12-14 | End: 2025-01-13

## 2024-11-12 ASSESSMENT — PATIENT HEALTH QUESTIONNAIRE - PHQ9
10. IF YOU CHECKED OFF ANY PROBLEMS, HOW DIFFICULT HAVE THESE PROBLEMS MADE IT FOR YOU TO DO YOUR WORK, TAKE CARE OF THINGS AT HOME, OR GET ALONG WITH OTHER PEOPLE: SOMEWHAT DIFFICULT
SUM OF ALL RESPONSES TO PHQ9 QUESTIONS 1 AND 2: 2
2. FEELING DOWN, DEPRESSED OR HOPELESS: SEVERAL DAYS
1. LITTLE INTEREST OR PLEASURE IN DOING THINGS: SEVERAL DAYS

## 2024-11-12 ASSESSMENT — PAIN - FUNCTIONAL ASSESSMENT: PAIN_FUNCTIONAL_ASSESSMENT: 0-10

## 2024-11-12 ASSESSMENT — PAIN SCALES - GENERAL
PAINLEVEL_OUTOF10: 7
PAINLEVEL_OUTOF10: 7

## 2024-11-12 ASSESSMENT — PAIN DESCRIPTION - DESCRIPTORS: DESCRIPTORS: ACHING;SHARP

## 2024-11-12 NOTE — PROGRESS NOTES
MEDICATION NAME: Percocet  STRENGTH: 7.5/325mg  LAST FILL DATE: 10/15/24  DATE LAST TAKEN: 24  QUANTITY FILLED: 90  QUANTITY REMAININ  COUNT COMPLETED BY: ROULA GRANADO RN and SONNY LEHMAN     MEDICATION NAME: Buprenorphine  Pt. Stated that she grabbed the wrong box. Box pt. Has stated filled date 24.      UDS LAST COMPLETED:   CONTROLLED SUBSTANCES AGREEMENT LAST SIGNED:   ORT LAST COMPLETED:  Modified [employer] disability form filled out annually.         UDS LAST COMPLETED:   CONTROLLED SUBSTANCES AGREEMENT LAST SIGNED:   ORT LAST COMPLETED:  Modified [employer] disability form filled out annually.

## 2024-11-18 ENCOUNTER — APPOINTMENT (OUTPATIENT)
Dept: ENDOCRINOLOGY | Facility: CLINIC | Age: 54
End: 2024-11-18
Payer: COMMERCIAL

## 2024-11-18 DIAGNOSIS — E11.9 TYPE 2 DIABETES MELLITUS WITHOUT COMPLICATION, WITHOUT LONG-TERM CURRENT USE OF INSULIN (MULTI): ICD-10-CM

## 2024-11-18 LAB
POC HEMOGLOBIN A1C: 5.8 % (ref 4.2–6.5)
SCAN RESULT: NORMAL

## 2024-11-18 PROCEDURE — 99214 OFFICE O/P EST MOD 30 MIN: CPT | Performed by: INTERNAL MEDICINE

## 2024-11-18 PROCEDURE — 1036F TOBACCO NON-USER: CPT | Performed by: INTERNAL MEDICINE

## 2024-11-18 PROCEDURE — 83036 HEMOGLOBIN GLYCOSYLATED A1C: CPT | Performed by: INTERNAL MEDICINE

## 2024-11-18 PROCEDURE — G2211 COMPLEX E/M VISIT ADD ON: HCPCS | Performed by: INTERNAL MEDICINE

## 2024-11-18 PROCEDURE — 3046F HEMOGLOBIN A1C LEVEL >9.0%: CPT | Performed by: INTERNAL MEDICINE

## 2024-11-18 RX ORDER — BLOOD-GLUCOSE,RECEIVER,CONT
EACH MISCELLANEOUS
Qty: 1 EACH | Refills: 0 | Status: SHIPPED | OUTPATIENT
Start: 2024-11-18

## 2024-11-18 RX ORDER — BLOOD-GLUCOSE SENSOR
EACH MISCELLANEOUS
Qty: 9 EACH | Refills: 3 | Status: SHIPPED | OUTPATIENT
Start: 2024-11-18

## 2024-11-18 ASSESSMENT — ENCOUNTER SYMPTOMS
NAUSEA: 0
POLYDIPSIA: 0
SORE THROAT: 0
HEADACHES: 0
VOMITING: 0
NERVOUS/ANXIOUS: 0
CONSTIPATION: 0
COUGH: 0
SHORTNESS OF BREATH: 0
FEVER: 0
ARTHRALGIAS: 0
DIARRHEA: 0
ABDOMINAL PAIN: 0
FREQUENCY: 0
APPETITE CHANGE: 0

## 2024-11-18 NOTE — LETTER
November 18, 2024     No Recipients    Patient: Rita Sabillon   YOB: 1970   Date of Visit: 11/18/2024       Dear Dr. Oh Recipients:    Thank you for referring Rita Sabillon to me for evaluation. Below are my notes for this consultation.  If you have questions, please do not hesitate to call me. I look forward to following your patient along with you.       Sincerely,     Jared Dalton MD      CC: No Recipients  ______________________________________________________________________________________    History Of Present Illness  Rita Sabillon is a 54 y.o. female     Duration of type 2 diabetes mellitus:  4 months  Complications:  {Diagnoses; complications diabetes:1215}    Patient is non-ambulatory due to cerebral palsy  Limited dexterity, no use of left hand  Preexisting neuropathy    Metformin 500 mg BID  Glimepiride 2 mg/day    Patient is not testing glucose due to lack of dexterity  FreeStyle Hernandez denied by insurance  She was offered a DexCom trial.  Her aides will need to certify to use a medical device however.     Last eye exam:      Past Medical History  She has a past medical history of Abnormal urine sediment (06/11/2019), Asthma, Cellulitis of right leg (08/01/2019), Cerebral palsy, Chronic pain syndrome (05/12/2021), Conjunctivitis (12/05/2023), Dyspnea (12/05/2023), Blanco catheter present (12/12/2020), Jaskaran hematuria (12/05/2023), Hemorrhagic cystitis (10/14/2021), Left ankle sprain (02/04/2020), Low back pain, unspecified (12/05/2023), Neuropathy, Osteoarthritis, Type 2 diabetes mellitus, Urinary pain (03/09/2020), and Urinary tract infection (09/17/2020).    Surgical History  She has a past surgical history that includes Other surgical history (07/13/2022); Other surgical history (07/13/2022); and Other surgical history (07/20/2022).     Social History  She reports that she has never smoked. She has never used smokeless tobacco. She reports that she does not drink  alcohol and does not use drugs.    Family History  No family history on file.    Medications  Current Outpatient Medications   Medication Instructions   • albuterol 90 mcg/actuation inhaler 2 puffs, inhalation, Every 6 hours PRN   • albuterol 90 mcg/actuation inhaler 2 puffs, inhalation, Every 4 hours PRN   • albuterol 90 mcg/actuation inhaler 2 puffs, inhalation, Every 6 hours PRN   • Arnuity Ellipta 200 mcg/actuation inhaler INHALE ONE BLISTER WITH DEVICE ONCE A DAY   • aspirin 81 mg, oral, Daily   • atorvastatin (LIPITOR) 20 mg, oral, Daily   • baclofen (Lioresal) 20 mg tablet 1 TAB BY MOUTH THREE TIMES A DAY   • blood sugar diagnostic (Blood Glucose Test) strip Use to check your blood sugar twice daily   • blood-glucose meter misc use to test blood sugar three times a day   • buprenorphine (Butrans) 5 mcg/hour patch 1 patch, transdermal, Once Weekly   • cetirizine (ZYRTEC) 10 mg, oral, Daily   • cyclobenzaprine (Flexeril) 5 mg tablet 1 TAB BY MOUTH EVERY 8 HOURS AS NEEDED   • diclofenac (Voltaren) 50 mg EC tablet Take 1 tablet (50 mg) by mouth once daily.   • docusate sodium (Enemeez) 283 mg/5 mL enema 1 RECTALLY ONCE A DAY   • doxepin (SINEquan) 50 mg capsule 1 CAP BY MOUTH DAILY AT BEDTIME   • ergocalciferol (VITAMIN D-2) 1,250 mcg, oral, Once Weekly   • famotidine (Pepcid) 40 mg tablet Take 1 tablet (40 mg) by mouth 2 times a day.   • FeroSuL tablet 1 tablet, oral, Daily   • fluconazole (DIFLUCAN) 150 mg, Once   • fluticasone (Flonase) 50 mcg/actuation nasal spray 2 SPRAYS NASALLY ONCE A DAY AS DIRECTED   • gabapentin (Neurontin) 600 mg tablet 1 TAB BY MOUTH THREE TIMES A DAY   • glimepiride (AMARYL) 2 mg, oral, Daily with breakfast   • lancets misc use to test blood sugar three times a day   • metFORMIN (GLUCOPHAGE) 500 mg, oral, 2 times daily before meals   • metoprolol succinate XL (TOPROL-XL) 25 mg, oral, Daily   • montelukast (SINGULAIR) 10 mg, oral, Daily   • Motegrity 2 mg, 2 times daily   • Myrbetriq  "25 mg tablet extended release 24 hr 24 hr tablet TAKE ONE TABLET BY MOUTH ONCE DAILY Oral for 30   • naloxone (Narcan) 4 mg/0.1 mL nasal spray ADMINISTER A SINGLE SPRAY INTRANASALLY INTO ONE NOSTRIL. CALL 911. MAY REPEAT X 1.   • naratriptan (Amerge) 2.5 mg tablet 1 TAB BY MOUTH AS NEEDED AT ONSET OF MIGRAINE.;MAY REPEAT DOSE ONCE AFTER 4 HOURS IF NEEDED   • nystatin (Mycostatin) 100,000 unit/gram powder APPLY TOPICALLY TO AFFECTED AREA TWICE A DAY AS DIRECTED   • oxybutynin (Ditropan) 5 mg tablet 1 TAB BY MOUTH THREE TIMES A DAY   • [START ON 12/14/2024] oxyCODONE-acetaminophen (Percocet) 7.5-325 mg tablet 1 tablet, oral, Every 8 hours PRN   • oxyCODONE-acetaminophen (Percocet) 7.5-325 mg tablet 1 tablet, oral, Every 8 hours PRN   • pantoprazole (ProtoNix) 40 mg EC tablet Every 24 hours   • polyethylene glycol (Glycolax, Miralax) 17 gram/dose powder    • spironolactone (ALDACTONE) 25 mg, oral, Daily   • SSD 1 % cream APPLY TO AFFECTED AREAS ONCE A DAY   • venlafaxine XR (EFFEXOR-XR) 75 mg, oral, Daily       Allergies  Patient has no known allergies.    Review of Systems      Last Recorded Vitals  There were no vitals taken for this visit.    Physical Exam     Relevant Results  Glucose (mg/dL)   Date Value   08/07/2024 251 (H)   08/06/2024 214 (H)   08/05/2024 264 (H)     Hemoglobin A1C (%)   Date Value   08/05/2024 10.5 (H)   05/11/2021 6.0     Bicarbonate (mmol/L)   Date Value   08/07/2024 25   08/06/2024 28   08/05/2024 28     Urea Nitrogen (mg/dL)   Date Value   08/07/2024 12   08/06/2024 8   08/05/2024 8     Creatinine (mg/dL)   Date Value   08/07/2024 0.31 (L)   08/06/2024 0.26 (L)   08/05/2024 0.33 (L)     No components found for: \"[id number]\"  Lab Results   Component Value Date    CHOL 151 05/11/2021    CHOL 255 (H) 02/16/2018     Lab Results   Component Value Date    HDL 32 (L) 05/11/2021    HDL 34 (L) 02/16/2018     Lab Results   Component Value Date    LDLCALC 66 05/11/2021    LDLCALC 179 (H) " "02/16/2018     Lab Results   Component Value Date    TRIG 265 (H) 05/11/2021    TRIG 209 (H) 02/16/2018     No components found for: \"CHOLHDL\"   Lab Results   Component Value Date    TSH 1.98 05/11/2021     No results found for: \"ALBUR\", \"[id number]\"       IMPRESSION  ***    A1c 5.8%    RECOMMENDATIONS  Stop glimepiride    Continue metformin 500 mg twice daily    DexCom G7  Review DexCom epi at all appointments    Follow up 6 months      "

## 2024-11-18 NOTE — PROGRESS NOTES
History Of Present Illness  Rita Sbaillon is a 54 y.o. female     Duration of type 2 diabetes mellitus:  4 months  Complications:  none    Patient is non-ambulatory due to cerebral palsy  Limited dexterity, no use of left hand  Preexisting neuropathy    Metformin 500 mg BID  Glimepiride 2 mg/day    Patient is not testing glucose due to lack of dexterity  FreeStyle Hernandez denied by insurance  She was offered a DexCom trial.  Her aides will need to certify to use a medical device however.     Last eye exam:      Past Medical History  She has a past medical history of Abnormal urine sediment (06/11/2019), Asthma, Cellulitis of right leg (08/01/2019), Cerebral palsy, Chronic pain syndrome (05/12/2021), Conjunctivitis (12/05/2023), Dyspnea (12/05/2023), Blanco catheter present (12/12/2020), Jaskaran hematuria (12/05/2023), Hemorrhagic cystitis (10/14/2021), Left ankle sprain (02/04/2020), Low back pain, unspecified (12/05/2023), Neuropathy, Osteoarthritis, Type 2 diabetes mellitus, Urinary pain (03/09/2020), and Urinary tract infection (09/17/2020).    Surgical History  She has a past surgical history that includes Other surgical history (07/13/2022); Other surgical history (07/13/2022); and Other surgical history (07/20/2022).     Social History  She reports that she has never smoked. She has never used smokeless tobacco. She reports that she does not drink alcohol and does not use drugs.    Family History  No family history on file.    Medications  Current Outpatient Medications   Medication Instructions    albuterol 90 mcg/actuation inhaler 2 puffs, inhalation, Every 6 hours PRN    albuterol 90 mcg/actuation inhaler 2 puffs, inhalation, Every 4 hours PRN    albuterol 90 mcg/actuation inhaler 2 puffs, inhalation, Every 6 hours PRN    Arnuity Ellipta 200 mcg/actuation inhaler INHALE ONE BLISTER WITH DEVICE ONCE A DAY    aspirin 81 mg, oral, Daily    atorvastatin (LIPITOR) 20 mg, oral, Daily    baclofen (Lioresal) 20 mg  tablet 1 TAB BY MOUTH THREE TIMES A DAY    blood sugar diagnostic (Blood Glucose Test) strip Use to check your blood sugar twice daily    blood-glucose meter misc use to test blood sugar three times a day    buprenorphine (Butrans) 5 mcg/hour patch 1 patch, transdermal, Once Weekly    cetirizine (ZYRTEC) 10 mg, oral, Daily    cyclobenzaprine (Flexeril) 5 mg tablet 1 TAB BY MOUTH EVERY 8 HOURS AS NEEDED    diclofenac (Voltaren) 50 mg EC tablet Take 1 tablet (50 mg) by mouth once daily.    docusate sodium (Enemeez) 283 mg/5 mL enema 1 RECTALLY ONCE A DAY    doxepin (SINEquan) 50 mg capsule 1 CAP BY MOUTH DAILY AT BEDTIME    ergocalciferol (VITAMIN D-2) 1,250 mcg, oral, Once Weekly    famotidine (Pepcid) 40 mg tablet Take 1 tablet (40 mg) by mouth 2 times a day.    FeroSuL tablet 1 tablet, oral, Daily    fluconazole (DIFLUCAN) 150 mg, Once    fluticasone (Flonase) 50 mcg/actuation nasal spray 2 SPRAYS NASALLY ONCE A DAY AS DIRECTED    gabapentin (Neurontin) 600 mg tablet 1 TAB BY MOUTH THREE TIMES A DAY    glimepiride (AMARYL) 2 mg, oral, Daily with breakfast    lancets misc use to test blood sugar three times a day    metFORMIN (GLUCOPHAGE) 500 mg, oral, 2 times daily before meals    metoprolol succinate XL (TOPROL-XL) 25 mg, oral, Daily    montelukast (SINGULAIR) 10 mg, oral, Daily    Motegrity 2 mg, 2 times daily    Myrbetriq 25 mg tablet extended release 24 hr 24 hr tablet TAKE ONE TABLET BY MOUTH ONCE DAILY Oral for 30    naloxone (Narcan) 4 mg/0.1 mL nasal spray ADMINISTER A SINGLE SPRAY INTRANASALLY INTO ONE NOSTRIL. CALL 911. MAY REPEAT X 1.    naratriptan (Amerge) 2.5 mg tablet 1 TAB BY MOUTH AS NEEDED AT ONSET OF MIGRAINE.;MAY REPEAT DOSE ONCE AFTER 4 HOURS IF NEEDED    nystatin (Mycostatin) 100,000 unit/gram powder APPLY TOPICALLY TO AFFECTED AREA TWICE A DAY AS DIRECTED    oxybutynin (Ditropan) 5 mg tablet 1 TAB BY MOUTH THREE TIMES A DAY    [START ON 12/14/2024] oxyCODONE-acetaminophen (Percocet) 7.5-325  mg tablet 1 tablet, oral, Every 8 hours PRN    oxyCODONE-acetaminophen (Percocet) 7.5-325 mg tablet 1 tablet, oral, Every 8 hours PRN    pantoprazole (ProtoNix) 40 mg EC tablet Every 24 hours    polyethylene glycol (Glycolax, Miralax) 17 gram/dose powder     spironolactone (ALDACTONE) 25 mg, oral, Daily    SSD 1 % cream APPLY TO AFFECTED AREAS ONCE A DAY    venlafaxine XR (EFFEXOR-XR) 75 mg, oral, Daily       Allergies  Patient has no known allergies.    Review of Systems   Constitutional:  Negative for appetite change and fever.   HENT:  Negative for sore throat.         Denies dry mouth   Eyes:  Negative for visual disturbance.   Respiratory:  Negative for cough and shortness of breath.    Cardiovascular:  Negative for chest pain.   Gastrointestinal:  Negative for abdominal pain, constipation, diarrhea, nausea and vomiting.   Endocrine: Negative for polydipsia and polyuria.   Genitourinary:  Negative for frequency.   Musculoskeletal:  Negative for arthralgias.   Skin:  Negative for rash.   Neurological:  Negative for headaches.   Psychiatric/Behavioral:  The patient is not nervous/anxious.          Last Recorded Vitals  There were no vitals taken for this visit.    Physical Exam  Constitutional:       General: She is not in acute distress.     Comments: Examined in wheelchair   HENT:      Head: Normocephalic.      Mouth/Throat:      Mouth: Mucous membranes are moist.   Eyes:      Extraocular Movements: Extraocular movements intact.   Cardiovascular:      Pulses:           Radial pulses are 2+ on the right side and 2+ on the left side.   Musculoskeletal:      Right lower leg: No edema.      Left lower leg: No edema.   Neurological:      Mental Status: She is alert.      Motor: No tremor.   Psychiatric:         Mood and Affect: Affect normal.          Relevant Results  Glucose (mg/dL)   Date Value   08/07/2024 251 (H)   08/06/2024 214 (H)   08/05/2024 264 (H)     Hemoglobin A1C (%)   Date Value   08/05/2024 10.5 (H)    05/11/2021 6.0     Bicarbonate (mmol/L)   Date Value   08/07/2024 25   08/06/2024 28   08/05/2024 28     Urea Nitrogen (mg/dL)   Date Value   08/07/2024 12   08/06/2024 8   08/05/2024 8     Creatinine (mg/dL)   Date Value   08/07/2024 0.31 (L)   08/06/2024 0.26 (L)   08/05/2024 0.33 (L)     Lab Results   Component Value Date    CHOL 151 05/11/2021    CHOL 255 (H) 02/16/2018     Lab Results   Component Value Date    HDL 32 (L) 05/11/2021    HDL 34 (L) 02/16/2018     Lab Results   Component Value Date    LDLCALC 66 05/11/2021    LDLCALC 179 (H) 02/16/2018     Lab Results   Component Value Date    TRIG 265 (H) 05/11/2021    TRIG 209 (H) 02/16/2018     Lab Results   Component Value Date    TSH 1.98 05/11/2021       IMPRESSION  TYPE 2 DIABETES MELLITUS  Rapid A1c 5.8%  A1c much improved since diagnosis  Denies hypoglycemia  Patient is unable to perform her own fingerstick, unable to use left hand  She has health aides available several hours per day      RECOMMENDATIONS  Stop glimepiride    Continue metformin 500 mg twice daily    DexCom G7  Review DexCom epi at all appointments    Follow up 6 months

## 2024-11-18 NOTE — PATIENT INSTRUCTIONS
A1c 5.8%    RECOMMENDATIONS  Stop glimepiride    Continue metformin 500 mg twice daily    DexCom G7  Review DexCom epi at all appointments    Follow up 6 months

## 2024-11-18 NOTE — LETTER
November 18, 2024     Michelle Griggs PA-C  [address] [place] [address]  [facility]  [address]  [place] [address]    Patient: Rita Sabillon   YOB: 1970   Date of Visit: 11/18/2024       Dear Dr. Michelle Griggs PA-C:    Thank you for referring Rita Sabillon to me for evaluation. Below are my notes for this consultation.  If you have questions, please do not hesitate to call me. I look forward to following your patient along with you.       Sincerely,     Jared Dalton MD      CC: No Recipients  ______________________________________________________________________________________    History Of Present Illness  Rita Sabillon is a 54 y.o. female     Duration of type 2 diabetes mellitus:  4 months  Complications:  none    Patient is non-ambulatory due to cerebral palsy  Limited dexterity, no use of left hand  Preexisting neuropathy    Metformin 500 mg BID  Glimepiride 2 mg/day    Patient is not testing glucose due to lack of dexterity  FreeStyle Hernandez denied by insurance  She was offered a DexCom trial.  Her aides will need to certify to use a medical device however.     Last eye exam:      Past Medical History  She has a past medical history of Abnormal urine sediment (06/11/2019), Asthma, Cellulitis of right leg (08/01/2019), Cerebral palsy, Chronic pain syndrome (05/12/2021), Conjunctivitis (12/05/2023), Dyspnea (12/05/2023), Blanco catheter present (12/12/2020), Jaskaran hematuria (12/05/2023), Hemorrhagic cystitis (10/14/2021), Left ankle sprain (02/04/2020), Low back pain, unspecified (12/05/2023), Neuropathy, Osteoarthritis, Type 2 diabetes mellitus, Urinary pain (03/09/2020), and Urinary tract infection (09/17/2020).    Surgical History  She has a past surgical history that includes Other surgical history (07/13/2022); Other surgical history (07/13/2022); and Other surgical history (07/20/2022).     Social History  She reports that she has never smoked. She has never used  smokeless tobacco. She reports that she does not drink alcohol and does not use drugs.    Family History  No family history on file.    Medications  Current Outpatient Medications   Medication Instructions   • albuterol 90 mcg/actuation inhaler 2 puffs, inhalation, Every 6 hours PRN   • albuterol 90 mcg/actuation inhaler 2 puffs, inhalation, Every 4 hours PRN   • albuterol 90 mcg/actuation inhaler 2 puffs, inhalation, Every 6 hours PRN   • Arnuity Ellipta 200 mcg/actuation inhaler INHALE ONE BLISTER WITH DEVICE ONCE A DAY   • aspirin 81 mg, oral, Daily   • atorvastatin (LIPITOR) 20 mg, oral, Daily   • baclofen (Lioresal) 20 mg tablet 1 TAB BY MOUTH THREE TIMES A DAY   • blood sugar diagnostic (Blood Glucose Test) strip Use to check your blood sugar twice daily   • blood-glucose meter misc use to test blood sugar three times a day   • buprenorphine (Butrans) 5 mcg/hour patch 1 patch, transdermal, Once Weekly   • cetirizine (ZYRTEC) 10 mg, oral, Daily   • cyclobenzaprine (Flexeril) 5 mg tablet 1 TAB BY MOUTH EVERY 8 HOURS AS NEEDED   • diclofenac (Voltaren) 50 mg EC tablet Take 1 tablet (50 mg) by mouth once daily.   • docusate sodium (Enemeez) 283 mg/5 mL enema 1 RECTALLY ONCE A DAY   • doxepin (SINEquan) 50 mg capsule 1 CAP BY MOUTH DAILY AT BEDTIME   • ergocalciferol (VITAMIN D-2) 1,250 mcg, oral, Once Weekly   • famotidine (Pepcid) 40 mg tablet Take 1 tablet (40 mg) by mouth 2 times a day.   • FeroSuL tablet 1 tablet, oral, Daily   • fluconazole (DIFLUCAN) 150 mg, Once   • fluticasone (Flonase) 50 mcg/actuation nasal spray 2 SPRAYS NASALLY ONCE A DAY AS DIRECTED   • gabapentin (Neurontin) 600 mg tablet 1 TAB BY MOUTH THREE TIMES A DAY   • glimepiride (AMARYL) 2 mg, oral, Daily with breakfast   • lancets misc use to test blood sugar three times a day   • metFORMIN (GLUCOPHAGE) 500 mg, oral, 2 times daily before meals   • metoprolol succinate XL (TOPROL-XL) 25 mg, oral, Daily   • montelukast (SINGULAIR) 10 mg, oral,  Daily   • Motegrity 2 mg, 2 times daily   • Myrbetriq 25 mg tablet extended release 24 hr 24 hr tablet TAKE ONE TABLET BY MOUTH ONCE DAILY Oral for 30   • naloxone (Narcan) 4 mg/0.1 mL nasal spray ADMINISTER A SINGLE SPRAY INTRANASALLY INTO ONE NOSTRIL. CALL 911. MAY REPEAT X 1.   • naratriptan (Amerge) 2.5 mg tablet 1 TAB BY MOUTH AS NEEDED AT ONSET OF MIGRAINE.;MAY REPEAT DOSE ONCE AFTER 4 HOURS IF NEEDED   • nystatin (Mycostatin) 100,000 unit/gram powder APPLY TOPICALLY TO AFFECTED AREA TWICE A DAY AS DIRECTED   • oxybutynin (Ditropan) 5 mg tablet 1 TAB BY MOUTH THREE TIMES A DAY   • [START ON 12/14/2024] oxyCODONE-acetaminophen (Percocet) 7.5-325 mg tablet 1 tablet, oral, Every 8 hours PRN   • oxyCODONE-acetaminophen (Percocet) 7.5-325 mg tablet 1 tablet, oral, Every 8 hours PRN   • pantoprazole (ProtoNix) 40 mg EC tablet Every 24 hours   • polyethylene glycol (Glycolax, Miralax) 17 gram/dose powder    • spironolactone (ALDACTONE) 25 mg, oral, Daily   • SSD 1 % cream APPLY TO AFFECTED AREAS ONCE A DAY   • venlafaxine XR (EFFEXOR-XR) 75 mg, oral, Daily       Allergies  Patient has no known allergies.    Review of Systems   Constitutional:  Negative for appetite change and fever.   HENT:  Negative for sore throat.         Denies dry mouth   Eyes:  Negative for visual disturbance.   Respiratory:  Negative for cough and shortness of breath.    Cardiovascular:  Negative for chest pain.   Gastrointestinal:  Negative for abdominal pain, constipation, diarrhea, nausea and vomiting.   Endocrine: Negative for polydipsia and polyuria.   Genitourinary:  Negative for frequency.   Musculoskeletal:  Negative for arthralgias.   Skin:  Negative for rash.   Neurological:  Negative for headaches.   Psychiatric/Behavioral:  The patient is not nervous/anxious.          Last Recorded Vitals  There were no vitals taken for this visit.    Physical Exam  Constitutional:       General: She is not in acute distress.     Comments: Examined  in wheelchair   HENT:      Head: Normocephalic.      Mouth/Throat:      Mouth: Mucous membranes are moist.   Eyes:      Extraocular Movements: Extraocular movements intact.   Cardiovascular:      Pulses:           Radial pulses are 2+ on the right side and 2+ on the left side.   Musculoskeletal:      Right lower leg: No edema.      Left lower leg: No edema.   Neurological:      Mental Status: She is alert.      Motor: No tremor.   Psychiatric:         Mood and Affect: Affect normal.          Relevant Results  Glucose (mg/dL)   Date Value   08/07/2024 251 (H)   08/06/2024 214 (H)   08/05/2024 264 (H)     Hemoglobin A1C (%)   Date Value   08/05/2024 10.5 (H)   05/11/2021 6.0     Bicarbonate (mmol/L)   Date Value   08/07/2024 25   08/06/2024 28   08/05/2024 28     Urea Nitrogen (mg/dL)   Date Value   08/07/2024 12   08/06/2024 8   08/05/2024 8     Creatinine (mg/dL)   Date Value   08/07/2024 0.31 (L)   08/06/2024 0.26 (L)   08/05/2024 0.33 (L)     Lab Results   Component Value Date    CHOL 151 05/11/2021    CHOL 255 (H) 02/16/2018     Lab Results   Component Value Date    HDL 32 (L) 05/11/2021    HDL 34 (L) 02/16/2018     Lab Results   Component Value Date    LDLCALC 66 05/11/2021    LDLCALC 179 (H) 02/16/2018     Lab Results   Component Value Date    TRIG 265 (H) 05/11/2021    TRIG 209 (H) 02/16/2018     Lab Results   Component Value Date    TSH 1.98 05/11/2021       IMPRESSION  TYPE 2 DIABETES MELLITUS  Rapid A1c 5.8%  A1c much improved since diagnosis  Denies hypoglycemia  Patient is unable to perform her own fingerstick, unable to use left hand  She has health aides available several hours per day      RECOMMENDATIONS  Stop glimepiride    Continue metformin 500 mg twice daily    DexCom G7  Review DexCom epi at all appointments    Follow up 6 months

## 2024-11-22 ENCOUNTER — APPOINTMENT (OUTPATIENT)
Dept: PRIMARY CARE | Facility: CLINIC | Age: 54
End: 2024-11-22
Payer: COMMERCIAL

## 2024-11-22 VITALS — WEIGHT: 160 LBS | OXYGEN SATURATION: 96 % | BODY MASS INDEX: 34.52 KG/M2 | HEIGHT: 57 IN | HEART RATE: 110 BPM

## 2024-11-22 DIAGNOSIS — G80.0 SPASTIC QUADRIPLEGIC CEREBRAL PALSY (MULTI): Primary | ICD-10-CM

## 2024-11-22 DIAGNOSIS — R29.898 BILATERAL ARM WEAKNESS: ICD-10-CM

## 2024-11-22 PROCEDURE — G2211 COMPLEX E/M VISIT ADD ON: HCPCS | Performed by: PHYSICIAN ASSISTANT

## 2024-11-22 PROCEDURE — 90656 IIV3 VACC NO PRSV 0.5 ML IM: CPT | Performed by: PHYSICIAN ASSISTANT

## 2024-11-22 PROCEDURE — 3046F HEMOGLOBIN A1C LEVEL >9.0%: CPT | Performed by: PHYSICIAN ASSISTANT

## 2024-11-22 PROCEDURE — 3008F BODY MASS INDEX DOCD: CPT | Performed by: PHYSICIAN ASSISTANT

## 2024-11-22 PROCEDURE — G0008 ADMIN INFLUENZA VIRUS VAC: HCPCS | Performed by: PHYSICIAN ASSISTANT

## 2024-11-22 PROCEDURE — 99212 OFFICE O/P EST SF 10 MIN: CPT | Performed by: PHYSICIAN ASSISTANT

## 2024-11-22 ASSESSMENT — ENCOUNTER SYMPTOMS
WEAKNESS: 1
SHORTNESS OF BREATH: 0
JOINT SWELLING: 0
NECK PAIN: 1
FATIGUE: 1
NUMBNESS: 1
ARTHRALGIAS: 1
MYALGIAS: 1
BACK PAIN: 1

## 2024-11-22 ASSESSMENT — PAIN SCALES - GENERAL: PAINLEVEL_OUTOF10: 4

## 2024-11-22 NOTE — PROGRESS NOTES
"Subjective   Patient ID: Rita Sabillon is a 54 y.o. female who presents for Follow-up (Discuss need for new wheelchair ).    HPI   Pt needs a new chair. Her foot pedals don't raise and lower, which is causing sores on the back of her left leg.  Her tray broke. She is a quadrapalegic. She is unable to push the chair manually. Her seating is uneven and her left side is lower. She is not able to stay supported on the left side. Her current chair is 5 years old.  [employer]. Fax [phone]  Requesting PT for both arms. Has issues with weakness with both.  Review of Systems   Constitutional:  Positive for fatigue.   Respiratory:  Negative for shortness of breath.    Musculoskeletal:  Positive for arthralgias, back pain, myalgias and neck pain. Negative for joint swelling.   Neurological:  Positive for weakness and numbness.       Objective   Pulse 110   Ht 1.448 m (4' 9\")   Wt 72.6 kg (160 lb)   LMP  (LMP Unknown)   SpO2 96%   BMI 34.62 kg/m²     Physical Exam  Constitutional:       Comments: In her motorized wheelchair   Neurological:      Mental Status: She is alert. Mental status is at baseline.   Psychiatric:         Mood and Affect: Mood normal.         Behavior: Behavior normal.         Thought Content: Thought content normal.         Judgment: Judgment normal.         Assessment/Plan   Diagnoses and all orders for this visit:  Spastic quadriplegic cerebral palsy (Multi)  -     Referral to Physical Therapy; Future  Bilateral arm weakness  -     Referral to Physical Therapy; Future  Other orders  -     Flu vaccine, trivalent, preservative free, age 6 months and greater (Fluarix/Fluzone/Flulaval)    Patient requested to go to physical therapy to help with some arm weakness she has noticed lately.  She does have rotator cuff issues but wishes to improve her strength so as to not lose complete mobility.     "

## 2024-11-22 NOTE — LETTER
To Whom it may concern,   Rita Sabillon is a patient of mine.  She is a quadriplegic who suffers from muscle spasticity and cerebral palsy.  She is in need of a new motorized wheelchair due to several factors: Her tray has actually broken, her seat sits unevenly which is causing sores on the back of her left leg, and she is not able to stay supported evenly in her chair due to this issue.  Her foot paddles are also broken and do not raise and lower either.  She is unable to push her chair manually.  Her current chair is 5 years old and is in need of repair.           Thank you for your time,    Sincerely,        Michelle Griggs PA-C

## 2024-12-09 ENCOUNTER — APPOINTMENT (OUTPATIENT)
Dept: PHYSICAL THERAPY | Facility: CLINIC | Age: 54
End: 2024-12-09
Payer: COMMERCIAL

## 2024-12-09 ENCOUNTER — EVALUATION (OUTPATIENT)
Dept: PHYSICAL THERAPY | Facility: CLINIC | Age: 54
End: 2024-12-09
Payer: COMMERCIAL

## 2024-12-09 DIAGNOSIS — G80.0 SPASTIC QUADRIPLEGIC CEREBRAL PALSY (MULTI): Primary | ICD-10-CM

## 2024-12-09 DIAGNOSIS — R29.898 BILATERAL ARM WEAKNESS: ICD-10-CM

## 2024-12-09 PROCEDURE — 97162 PT EVAL MOD COMPLEX 30 MIN: CPT | Mod: GP

## 2024-12-09 NOTE — PROGRESS NOTES
"Physical Therapy Evaluation and Treatment Note    Patient Name: Rita Sabillon  MRN: [id number]  Evaluating Clinician: CATALINA Clayton PT  Encounter date:  12/9/2024  Time Calculation  Start Time: 0100  Stop Time: 0200  Time Calculation (min): 60 min            INSURANCE:  Visit Number: 1  [place]ved Visits: [place]  Insurance Info: 1) [facility] MEDICARE DUAL COMPLETE FULL - NO AUTH - $240 DEDUCT not met / $8850 OOP not met / 80% coins / [facility] [id number]    2) [place] MEDICAID - Active / 100% contracted rate / Per RTE / ds 12/6/24.     Referral: spastic quadriplegia, bilateral arm weakness, 11/22/24 Michelle Griggs PA-C    PRECAUTIONS/SPECIAL CONCERNS/RELEVANT PMHX: neuropathy, cerebral palsy, DM2, LBP, spastic quadriplegia, osteoporosis, spina bifida, PVD, suprapubic catheter dysfunction, history \"rotator cuff issues\" \"I have rotator cuff tears on both sides, Migraine, muscle tension pain, Remote childhood history of adductor and bilateral hamstring \"release\", extensive spinal fusion thoracolumbar 1985, chronic pain syndrome seeing pain management,     Meds: baclofen, albuterol, lipitor, butrans, zyrtec, flexeril, gabapentin, metformin, toprol, singulaire, naloxone (narcan), effexor,       PT CLINICAL PROBLEM: decreased arm and shoulder strength and difficulty to roll in bed and left hip pain 5/10 at present.       Chief Medical Dx code:   1. Spastic quadriplegic cerebral palsy (Multi)  Referral to Physical Therapy    Follow Up In Physical Therapy      2. Bilateral arm weakness  Referral to Physical Therapy    Follow Up In Physical Therapy          SUBJECTIVE: Having more trouble turning in bed. Left lateral hip pain. Lives alone. Owns a home. Gets daily aides 2 hours in am and 6 hours in the evening. She has a retired service dog (10 years old) and is working on getting a new service dog. She sleeps on a hospital bed. Presents in a motorized  power scooter and is trying to make arrangements for a new updated " "powerscooter. She has a lift to get in and out of the bed and shower chair. She uses a lift to get out of her power chair. Goals to be able to roll in bed better to help her aides and have better arm strength. She has a handicap bathroom, ramps and accessible home situation. She has an electric . Her aides get her food. She enjoys cooking and baking. She did walk some until she was 12 years old. Is non ambulatory and requires assist/drew lift. \"Getting my arms and shoulders stronger would be helpful\" She uses [facility] for medical appointments. She does get left hip pain when being transferred by her aides. Her left hip has been hurting for the past 6 months no trauma. She uses a catheter. Her powerscooter does elevate, lean backwards, and can recline to almost horizontal for re-distribution of body weight pressure sore prevention.     Symptoms/NPRS before Rx: left lateral hip 5/10  Symptoms/NPRS after Rx: 5      TREATMENT: prom to bilateral arms and shoulders as a part of the evaluation process      PRIOR EDUCATION AND HEP (listed throughout episode/continuum of care):       OBJECTIVE:  STEADI Fall Risk: 0 (score of 4+ indicates fall risk)   OUTCOME TOOL:  PSFS 10%  Right shoulder elevation 90, Elbow flexion 100 and elbow extension -25 and shoulder strength 3+ with noted spasticity  Left shoulder elevation 120, elbow flexion 110 and elbow extension -20 and shoulder strength 3+ and spasticity  Not able to sit independently needs back rest  Fully reliant on powerscooter and drew lift transfers  Not able to transfer  Not able to stand and/or walk  Able to roll somewhat to the left side only from back lying  Has > 90 degrees neck rotational rom  Independent with use of powerscooter  Supination 45 degrees each  Does have functional finger and hand dexterity and able to operate scooter with hands  Good medical historian and conversant and appropriate  Right knee has passive motion only 60 to -30  Right hip " 20-30 degrees motion and okay  Left hip seated 10 degrees of hip motion with notable lateral hip pain  Left knee rom 40 - 50 ( 10 degrees only)   Has trace movement in the feet  Feet and legs are atrophic/small due to disuse state of paralysis    ASSESSMENT: spastic CP related quadriplegia LE more affected than Ue's and has good home adaptations to environment and assistive care. Presents with drew lift sling on back of scooter which is needed for any transfers or bodily mobility. She would like to have stronger arms and/or ability to better be able to roll herself when in bed. She also would like to have less left lateral hip pain which has been a problem for about 6 months. She may benefit from a trial of PT to address the UE per referral and to the extent able we will address the left hip.      Patient presents with INT tissue reactivity,  INT condition irritability, and LOW subject reactivity with impairments noted below and decreased level of functional abilities and would benefit from skilled PT to address limitations and achieve goals noted below.       PLAN: PT once per week for rom to Ue's, UE exercises, left hip rom while in scooter and any other measures that would be of help functionally or symptomatically with the aim of improving bed mobility to roll to the left side to off weight her pressure points in an effort to prevent pressure sores and enable some enhanced basic functional mobility.      Patient/parent/caregiver agreed and consented to plan of care for skilled physical therapy at 1 times per week for 6-12 weeks. Physical Therapy to include modalities prn as indicated, therapeutic exercise, manual therapy, neuromuscular re-education, gait and functional performance training, instruction and practice in a home exercise program (HEP) and self-management skills.       Complexity of Evaluation:   Based on the history including personal factors and/or comorbidities, examination of body systems  including body structures and function, activity limitations, and/or participation restrictions, as well as clinical presentation, patient meets criteria for a complexity evaluation.      CARE PLAN/GOALS: ( A = Achieved, NA = Not Achieved, P = Progressing toward goal needs additional skilled PT)    STG's: (      weeks /      visits )  1  2    LTG's  (   12   weeks /      visits )  1 able to roll backlying at home in her hospital bed to left side  2 able to elevate the shoulders > 120 degrees and UE strength will improve to 4-/5  3  4  5 The Global Rating of Change Score (GROC) will improve to 5/7 =  “a good deal better” or more.   6 Improve functional outcome tool score (FOTS: ARIADNA, NDI, ASES, ABC, etc.) by > 10 points for Minimally Important Clinical Difference (MICD).  7 Patient/client will be independent with a HEP and self-management skills to further enhance recovery and progress.  8 Patient/client will verbalize >75% symptom reduction for frequency and severity of symptoms and/or > two point reduction of VAS/NPRS.  9 The Patient Specific Functional Scale metric (PSFS) will improve from baseline value to greater than 80% functional.

## 2024-12-09 NOTE — LETTER
December 9, 2024    Jose Martin Clayton PT    Patient: Rita Sabillon   YOB: 1970   Date of Visit: 12/9/2024       Dear Michelle Griggs PA-C  [address] [place] [facility]  [address]  [place] [address]    The attached plan of care is being sent to you because your patient’s medical reimbursement requires that you certify the plan of care. Your signature is required to allow uninterrupted insurance coverage.      You may indicate your approval by signing below and faxing this form back to us at Dept Fax: [phone].    Please call Dept: [phone] with any questions or concerns.    Thank you for this referral,        Jose Martin Clayton PT  [facility]  [address] [facility] [id number] [phone]    Payer: Payor: [employer] DUAL COMPLETE / Plan: [employer] DUAL COMPLETE / Product Type: *No Product type* /                                                                         Date:     Dear Jose Martin Clayton PT,     Re: Ms. Rita Sabillon, MRN:[id number]    I certify that I have reviewed the attached plan of care and it is medically necessary for Ms. Rita Sabillon (1970) who is under my care.          ______________________________________                    _________________  Provider name and credentials                                           Date and time                                                                                           Plan of Care 12/9/24   Effective from: 12/9/2024  Effective to: 3/9/2025    Plan ID: [id number]            Participants as of Finalize on 12/9/2024    Name Type Comments Contact Info    Michelle Griggs PA-C PCP - General  [phone]    Jose Martin Clayton PT Physical Therapist         Last Plan Note     Author: Jose Martin Clayton PT Status: Incomplete Last edited: 12/9/2024  1:00 PM       Physical Therapy Evaluation and Treatment Note    Patient Name: Rita Sabillon  MRN:  [id number]  Evaluating Clinician: CATALINA Clayton PT  Encounter date:  12/9/2024  Time Calculation  Start Time: 0100  Stop Time: 0200  Time Calculation (min): 60 min            INSURANCE:  Visit Number: 1  Approved Visits: [place]  Insurance Info: 1) [facility] MEDICARE DUAL COMPLETE FULL - NO AUTH - $240 DEDUCT not met / $8850 OOP not met / 80% coins / [id number]    2) [place] MEDICAID - Active / 100% contracted rate / Per RTE / ds 12/6/24.     Referral: spastic quadriplegia, bilateral arm weakness, 11/22/24 Michelle Griggs PA-C    PRECAUTIONS/SPECIAL CONCERNS/RELEVANT PMHX: neuropathy, cerebral palsy, DM2, LBP, spastic quadriplegia, osteoporosis, spina bifida, PVD, suprapubic catheter dysfunction, history \"rotator cuff issues\" \"I have rotator cuff tears on both sides, Migraine, muscle tension pain, Remote childhood history of adductor and bilateral hamstring \"release\", extensive spinal fusion thoracolumbar 1985    Meds: baclofen, albuterol, lipitor, butrans, zyrtec, flexeril, gabapentin, metformin, toprol, singulaire, naloxone (narcan), effexor,       PT CLINICAL PROBLEM: decreased arm and shoulder strength and difficulty to roll in bed and left hip pain 5/10 at present.       Chief Medical Dx code:   1. Spastic quadriplegic cerebral palsy (Multi)  Referral to Physical Therapy    Follow Up In Physical Therapy      2. Bilateral arm weakness  Referral to Physical Therapy    Follow Up In Physical Therapy          SUBJECTIVE: Having more trouble turning in bed. Left lateral hip pain. Lives alone. Owns a home. Gets daily aides 2 hours in am and 6 hours in the evening. She has a retired service dog (10 years old) and is working on getting a new service dog. She sleeps on a hospital bed. Presents in a motorized  power scooter and is trying to make arrangements for a new updated powerscooter. She has a lift to get in and out of the bed and shower chair. She uses a lift to get out of her power chair. Goals to be able to " "roll in bed better to help her aides and have better arm strength. She has a handicap bathroom, ramps and accessible home situation. She has an electric . Her aides get her food. She enjoys cooking and baking. She did walk some until she was 12 years old. Is non ambulatory and requires assist/drew lift. \"Getting my arms and shoulders stronger would be helpful\" She uses [facility] for medical appointments. She does get left hip pain when being transferred by her aides. Her left hip has been hurting for the past 6 months no trauma. She uses a catheter. Her powerscooter does elevate, lean backwards, and can recline to almost horizontal for re-distribution of body weight pressure sore prevention.     Symptoms/NPRS before Rx: left lateral hip 5/10  Symptoms/NPRS after Rx: 5      TREATMENT: prom to bilateral arms and shoulders as a part of the evaluation process      PRIOR EDUCATION AND HEP (listed throughout episode/continuum of care):       OBJECTIVE:  JULIANNAADI Fall Risk: 0 (score of 4+ indicates fall risk)   OUTCOME TOOL:  PSFS 10%  Right shoulder elevation 90, Elbow flexion 100 and elbow extension -25 and shoulder strength 3+ with noted spasticity  Left shoulder elevation 120, elbow flexion 110 and elbow extension -20 and shoulder strength 3+ and spasticity  Not able to sit independently needs back rest  Fully reliant on powerscooter and drew lift transfers  Not able to transfer  Not able to stand and/or walk  Able to roll somewhat to the left side only from back lying  Has > 90 degrees neck rotational rom  Independent with use of powerscooter  Supination 45 degrees each  Does have functional finger and hand dexterity and able to operate scooter with hands  Good medical historian and conversant and appropriate  Right knee has passive motion only 60 to -30  Right hip 20-30 degrees motion and okay  Left hip seated 10 degrees of hip motion with notable lateral hip pain  Left knee rom 40 - 50 ( 10 degrees only) "   Has trace movement in the feet  Feet and legs are atrophic/small due to disuse state of paralysis    ASSESSMENT: spastic CP related quadriplegia LE more affected than Ue's and has good home adaptations to environment and assistive care. Presents with drew lift sling on back of scooter which is needed for any transfers or bodily mobility. She would like to have stronger arms and/or ability to better be able to roll herself when in bed. She also would like to have less left lateral hip pain which has been a problem for about 6 months. She may benefit from a trial of PT to address the UE per referral and to the extent able we will address the left hip.      Patient presents with INT tissue reactivity,  INT condition irritability, and LOW subject reactivity with impairments noted below and decreased level of functional abilities and would benefit from skilled PT to address limitations and achieve goals noted below.       PLAN: PT once per week for rom to Ue's, UE exercises, left hip rom while in scooter and any other measures that would be of help functionally or symptomatically with the aim of improving bed mobility to roll to the left side to off weight her pressure points in an effort to prevent pressure sores and enable some enhanced basic functional mobility.      Patient/parent/caregiver agreed and consented to plan of care for skilled physical therapy at 1 times per week for 6-12 weeks. Physical Therapy to include modalities prn as indicated, therapeutic exercise, manual therapy, neuromuscular re-education, gait and functional performance training, instruction and practice in a home exercise program (HEP) and self-management skills.       Complexity of Evaluation:   Based on the history including personal factors and/or comorbidities, examination of body systems including body structures and function, activity limitations, and/or participation restrictions, as well as clinical presentation, patient meets  criteria for a complexity evaluation.      CARE PLAN/GOALS: ( A = Achieved, NA = Not Achieved, P = Progressing toward goal needs additional skilled PT)    STG's: (      weeks /      visits )  1  2    LTG's  (   12   weeks /      visits )  1 able to roll backlying at home in her hospital bed to left side  2 able to elevate the shoulders > 120 degrees and UE strength will improve to 4-/5  3  4  5 The Global Rating of Change Score (GROC) will improve to 5/7 =  “a good deal better” or more.   6 Improve functional outcome tool score (FOTS: ARIADNA, NDI, ASES, ABC, etc.) by > 10 points for Minimally Important Clinical Difference (MICD).  7 Patient/client will be independent with a HEP and self-management skills to further enhance recovery and progress.  8 Patient/client will verbalize >75% symptom reduction for frequency and severity of symptoms and/or > two point reduction of VAS/NPRS.  9 The Patient Specific Functional Scale metric (PSFS) will improve from baseline value to greater than 80% functional.          Current Participants as of 12/9/2024    Name Type Comments Contact Info    Michelle Griggs PA-C PCP - General  [phone]    Signature pending    Jose Martin Clayton PT Physical Therapist      Signature pending

## 2024-12-11 ENCOUNTER — APPOINTMENT (OUTPATIENT)
Dept: PHYSICAL THERAPY | Facility: CLINIC | Age: 54
End: 2024-12-11
Payer: COMMERCIAL

## 2024-12-11 ENCOUNTER — TREATMENT (OUTPATIENT)
Dept: PHYSICAL THERAPY | Facility: CLINIC | Age: 54
End: 2024-12-11
Payer: COMMERCIAL

## 2024-12-11 DIAGNOSIS — R29.898 BILATERAL ARM WEAKNESS: ICD-10-CM

## 2024-12-11 DIAGNOSIS — G80.0 SPASTIC QUADRIPLEGIC CEREBRAL PALSY (MULTI): ICD-10-CM

## 2024-12-11 PROCEDURE — 97110 THERAPEUTIC EXERCISES: CPT | Mod: GP

## 2024-12-11 NOTE — PROGRESS NOTES
"Physical Therapy Treatment Note    Patient Name: Rita Sabillon  MRN: [id number]  Evaluating Clinician: CATALINA Clayton PT  Encounter date:  12/11/2024  Time Calculation  Start Time: 0230  Stop Time: 0318  Time Calculation (min): 48 min     PT Therapeutic Procedures Time Entry  Therapeutic Exercise Time Entry: 42      INSURANCE:  Visit Number: 2  Approved Visits: [place]  Insurance Info: 1) [facility] MEDICARE DUAL COMPLETE FULL - NO AUTH - $240 DEDUCT not met / $8850 OOP not met / 80% coins [id number]    2) [place] MEDICAID - Active / 100% contracted rate / Per RTE / ds 12/6/24.     Referral: spastic quadriplegia, bilateral arm weakness, 11/22/24 Michelle Griggs PA-C    PRECAUTIONS/SPECIAL CONCERNS/RELEVANT PMHX: neuropathy, cerebral palsy, DM2, LBP, spastic quadriplegia, osteoporosis, spina bifida, PVD, suprapubic catheter dysfunction, history \"rotator cuff issues\" \"I have rotator cuff tears on both sides, Migraine, muscle tension pain, Remote childhood history of adductor and bilateral hamstring \"release\", extensive spinal fusion thoracolumbar 1985, chronic pain syndrome seeing pain management,     Meds: baclofen, albuterol, lipitor, butrans, zyrtec, flexeril, gabapentin, metformin, toprol, singulaire, naloxone (narcan), effexor,       PT CLINICAL PROBLEM: decreased arm and shoulder strength and difficulty to roll in bed and left hip pain 5/10 at present.       Chief Medical Dx code:   1. Spastic quadriplegic cerebral palsy (Multi)  Follow Up In Physical Therapy      2. Bilateral arm weakness  Follow Up In Physical Therapy          SUBJECTIVE: Having more trouble turning in bed. Left lateral hip pain. Lives alone. Owns a home. Gets daily aides 2 hours in am and 6 hours in the evening. She has a retired service dog (10 years old) and is working on getting a new service dog. She sleeps on a hospital bed. Presents in a motorized  power scooter and is trying to make arrangements for a new updated " "powerscooter. She has a lift to get in and out of the bed and shower chair. She uses a lift to get out of her power chair. Goals to be able to roll in bed better to help her aides and have better arm strength. She has a handicap bathroom, ramps and accessible home situation. She has an electric . Her aides get her food. She enjoys cooking and baking. She did walk some until she was 12 years old. Is non ambulatory and requires assist/drew lift. \"Getting my arms and shoulders stronger would be helpful\" She uses [facility] for medical appointments. She does get left hip pain when being transferred by her aides. Her left hip has been hurting for the past 6 months no trauma. She uses a catheter. Her powerscooter does elevate, lean backwards, and can recline to almost horizontal for re-distribution of body weight pressure sore prevention.     Symptoms/NPRS before Rx: left lateral hip 5/10  Symptoms/NPRS after Rx: 5      TREATMENT: prom left knee and hip and LUE and RUE     Note sent to supervisor re: wheelchair therapist OT/PT for eval and to assist in process to update her powerscooter in concert with [employer]      PRIOR EDUCATION AND HEP (listed throughout episode/continuum of care):       OBJECTIVE:  STEADI Fall Risk: 0 (score of 4+ indicates fall risk)   OUTCOME TOOL:  PSFS 10%  Right shoulder elevation 90, Elbow flexion 100 and elbow extension -25 and shoulder strength 3+ with noted spasticity  Left shoulder elevation 120, elbow flexion 110 and elbow extension -20 and shoulder strength 3+ and spasticity  Not able to sit independently needs back rest  Fully reliant on powerscooter and drew lift transfers  Not able to transfer  Not able to stand and/or walk  Able to roll somewhat to the left side only from back lying  Has > 90 degrees neck rotational rom  Independent with use of powerscooter  Supination 45 degrees each  Does have functional finger and hand dexterity and able to operate " scooter with hands  Good medical historian and conversant and appropriate  Right knee has passive motion only 60 to -30  Right hip 20-30 degrees motion and okay  Left hip seated 10 degrees of hip motion with notable lateral hip pain  Left knee rom 40 - 50 ( 10 degrees only)   Has trace movement in the feet  Feet and legs are atrophic/small due to disuse state of paralysis    ASSESSMENT: very limited left knee and hip mobility and needs      PLAN: PT once per week for rom to Ue's, UE exercises, left hip rom while in scooter and any other measures that would be of help functionally or symptomatically with the aim of improving bed mobility to roll to the left side to off weight her pressure points in an effort to prevent pressure sores and enable some enhanced basic functional mobility.      Patient/parent/caregiver agreed and consented to plan of care for skilled physical therapy at 1 times per week for 6-12 weeks. Physical Therapy to include modalities prn as indicated, therapeutic exercise, manual therapy, neuromuscular re-education, gait and functional performance training, instruction and practice in a home exercise program (HEP) and self-management skills.       CARE PLAN/GOALS: ( A = Achieved, NA = Not Achieved, P = Progressing toward goal needs additional skilled PT)    STG's: (      weeks /      visits )  1  2    LTG's  (   12   weeks /      visits )  1 able to roll backlying at home in her hospital bed to left side  2 able to elevate the shoulders > 120 degrees and UE strength will improve to 4-/5  3  4  5 The Global Rating of Change Score (GROC) will improve to 5/7 =  “a good deal better” or more.   6 Improve functional outcome tool score (FOTS: ARIADNA, NDI, ASES, ABC, etc.) by > 10 points for Minimally Important Clinical Difference (MICD).  7 Patient/client will be independent with a HEP and self-management skills to further enhance recovery and progress.  8 Patient/client will verbalize >75% symptom reduction  for frequency and severity of symptoms and/or > two point reduction of VAS/NPRS.  9 The Patient Specific Functional Scale metric (PSFS) will improve from baseline value to greater than 80% functional.

## 2024-12-12 DIAGNOSIS — G80.0 SPASTIC QUADRIPLEGIC CEREBRAL PALSY (MULTI): Primary | ICD-10-CM

## 2024-12-12 DIAGNOSIS — G80.9 CEREBRAL PALSY, UNSPECIFIED TYPE (MULTI): ICD-10-CM

## 2024-12-18 ENCOUNTER — TREATMENT (OUTPATIENT)
Dept: PHYSICAL THERAPY | Facility: CLINIC | Age: 54
End: 2024-12-18
Payer: COMMERCIAL

## 2024-12-18 DIAGNOSIS — G80.0 SPASTIC QUADRIPLEGIC CEREBRAL PALSY (MULTI): ICD-10-CM

## 2024-12-18 DIAGNOSIS — R29.898 BILATERAL ARM WEAKNESS: ICD-10-CM

## 2024-12-18 PROCEDURE — 97110 THERAPEUTIC EXERCISES: CPT | Mod: GP

## 2024-12-18 NOTE — PROGRESS NOTES
"Physical Therapy Treatment Note    Patient Name: Rita Sabillon  MRN: [id number]  Evaluating Clinician: CATALINA Clayton PT  Encounter date:  12/18/2024  Time Calculation  Start Time: 0100  Stop Time: 0145  Time Calculation (min): 45 min     PT Therapeutic Procedures Time Entry  Therapeutic Exercise Time Entry: 41      INSURANCE:  Visit Number: 3  Approved Visits: [place]  Insurance Info: 1) [facility] MEDICARE DUAL COMPLETE FULL - NO AUTH - $240 DEDUCT not met / $8850 OOP not met / 80% coins / [id number]    2) [place] MEDICAID - Active / 100% contracted rate / Per RTE / ds 12/6/24.     Referral: spastic quadriplegia, bilateral arm weakness, 11/22/24 Michelle Griggs PA-C    PRECAUTIONS/SPECIAL CONCERNS/RELEVANT PMHX: neuropathy, cerebral palsy, DM2, LBP, spastic quadriplegia, osteoporosis, spina bifida, PVD, suprapubic catheter dysfunction, history \"rotator cuff issues\" \"I have rotator cuff tears on both sides, Migraine, muscle tension pain, Remote childhood history of adductor and bilateral hamstring \"release\", extensive spinal fusion thoracolumbar 1985, chronic pain syndrome seeing pain management,     Meds: baclofen, albuterol, lipitor, butrans, zyrtec, flexeril, gabapentin, metformin, toprol, singulaire, naloxone (narcan), effexor,       PT CLINICAL PROBLEM: decreased arm and shoulder strength and difficulty to roll in bed and left hip pain 5/10 at present.       Chief Medical Dx code:   1. Spastic quadriplegic cerebral palsy (Multi)  Follow Up In Physical Therapy      2. Bilateral arm weakness  Follow Up In Physical Therapy            SUBJECTIVE: Having more trouble turning in bed. Left lateral hip pain. Lives alone. Owns a home. Gets daily aides 2 hours in am and 6 hours in the evening. She has a retired service dog (10 years old) and is working on getting a new service dog. She sleeps on a hospital bed. Presents in a motorized  power scooter and is trying to make arrangements for a new updated " "powerscooter. She has a lift to get in and out of the bed and shower chair. She uses a lift to get out of her power chair. Goals to be able to roll in bed better to help her aides and have better arm strength. She has a handicap bathroom, ramps and accessible home situation. She has an electric . Her aides get her food. She enjoys cooking and baking. She did walk some until she was 12 years old. Is non ambulatory and requires assist/drew lift. \"Getting my arms and shoulders stronger would be helpful\" She uses [facility] for medical appointments. She does get left hip pain when being transferred by her aides. Her left hip has been hurting for the past 6 months no trauma. She uses a catheter. Her powerscooter does elevate, lean backwards, and can recline to almost horizontal for re-distribution of body weight pressure sore prevention.     12/18/24 did fine after last session.     Symptoms/NPRS before Rx: left lateral hip 5/10  Symptoms/NPRS after Rx: 5      TREATMENT:   Arrangements for wheelchair assessment reviewed in detail with patient  prom left knee and hip and LUE and RUE   Left shoulder IR/ER, elbow, wrist, forearm  Right shoulder IR ER elbow rom wrist and forearm    OBJECTIVE:  STEADI Fall Risk: 0 (score of 4+ indicates fall risk)   OUTCOME TOOL:  PSFS 10%  Right shoulder elevation 90, Elbow flexion 100 and elbow extension -25 and shoulder strength 3+ with noted spasticity  Left shoulder elevation 120, elbow flexion 110 and elbow extension -20 and shoulder strength 3+ and spasticity  Not able to sit independently needs back rest  Fully reliant on powerscooter and drew lift transfers  Not able to transfer  Not able to stand and/or walk  Able to roll somewhat to the left side only from back lying  Has > 90 degrees neck rotational rom  Independent with use of powerscooter  Supination 45 degrees each  Does have functional finger and hand dexterity and able to operate scooter with hands  Good medical " historian and conversant and appropriate  Right knee has passive motion only 60 to -30  Right hip 20-30 degrees motion and okay  Left hip seated 10 degrees of hip motion with notable lateral hip pain  Left knee rom 40 - 50 ( 10 degrees only)   Has trace movement in the feet  Feet and legs are atrophic/small due to disuse state of paralysis    12/18/24 left knee flex 20 and right knee 30 degrees    ASSESSMENT: very limited left knee and hip mobility and needs better left knee flexion rom to be able to sit more upright this is an essential need that requires skilled care.      PLAN: Arrangements being made for powerscooter/wheelchair assessment contacted [facility] and PCP and got an rx and pt. Provided needed contact information for [facility] to phone to arrange consult with both parties present hopefully in January. PT once per week for rom to Ue's, UE exercises, left hip rom while in scooter and any other measures that would be of help functionally or symptomatically with the aim of improving bed mobility to roll to the left side to off weight her pressure points in an effort to prevent pressure sores and enable some enhanced basic functional mobility.      Patient/parent/caregiver agreed and consented to plan of care for skilled physical therapy at 1 times per week for 6-12 weeks. Physical Therapy to include modalities prn as indicated, therapeutic exercise, manual therapy, neuromuscular re-education, gait and functional performance training, instruction and practice in a home exercise program (HEP) and self-management skills.       CARE PLAN/GOALS: ( A = Achieved, NA = Not Achieved, P = Progressing toward goal needs additional skilled PT)    STG's: (      weeks /      visits )  1  2    LTG's  (   12   weeks /      visits )  1 able to roll backlying at home in her hospital bed to left side  2 able to elevate the shoulders > 120 degrees and UE strength will improve to 4-/5  3  4  5 The Global  Rating of Change Score (GROC) will improve to 5/7 =  “a good deal better” or more.   6 Improve functional outcome tool score (FOTS: ARIADNA, NDI, ASES, ABC, etc.) by > 10 points for Minimally Important Clinical Difference (MICD).  7 Patient/client will be independent with a HEP and self-management skills to further enhance recovery and progress.  8 Patient/client will verbalize >75% symptom reduction for frequency and severity of symptoms and/or > two point reduction of VAS/NPRS.  9 The Patient Specific Functional Scale metric (PSFS) will improve from baseline value to greater than 80% functional.

## 2024-12-27 DIAGNOSIS — G43.009 MIGRAINE WITHOUT AURA, NOT INTRACTABLE, WITHOUT STATUS MIGRAINOSUS: ICD-10-CM

## 2024-12-27 DIAGNOSIS — E55.9 VITAMIN D DEFICIENCY: ICD-10-CM

## 2024-12-27 DIAGNOSIS — J45.20 MILD INTERMITTENT ASTHMA, UNSPECIFIED WHETHER COMPLICATED (HHS-HCC): ICD-10-CM

## 2024-12-27 RX ORDER — ERGOCALCIFEROL 1.25 MG/1
1 CAPSULE ORAL
Qty: 4 CAPSULE | Refills: 11 | Status: SHIPPED | OUTPATIENT
Start: 2024-12-29

## 2024-12-27 RX ORDER — FLUTICASONE PROPIONATE 50 MCG
SPRAY, SUSPENSION (ML) NASAL
Qty: 16 G | Refills: 11 | Status: SHIPPED | OUTPATIENT
Start: 2024-12-27

## 2024-12-27 RX ORDER — NARATRIPTAN 2.5 MG/1
TABLET ORAL
Qty: 9 TABLET | Refills: 11 | Status: SHIPPED | OUTPATIENT
Start: 2024-12-27

## 2025-01-03 ENCOUNTER — APPOINTMENT (OUTPATIENT)
Dept: PHYSICAL THERAPY | Facility: CLINIC | Age: 55
End: 2025-01-03
Payer: COMMERCIAL

## 2025-01-07 ENCOUNTER — TREATMENT (OUTPATIENT)
Dept: PHYSICAL THERAPY | Facility: CLINIC | Age: 55
End: 2025-01-07
Payer: COMMERCIAL

## 2025-01-07 DIAGNOSIS — G80.0 SPASTIC QUADRIPLEGIC CEREBRAL PALSY (MULTI): ICD-10-CM

## 2025-01-07 DIAGNOSIS — R29.898 BILATERAL ARM WEAKNESS: ICD-10-CM

## 2025-01-07 PROCEDURE — 97110 THERAPEUTIC EXERCISES: CPT | Mod: GP

## 2025-01-07 NOTE — PROGRESS NOTES
[facility]  Follow Up Office Visit Note 2025    Patient Information: Rita Sabillon, MRN: [id number], : 1970   Primary Care/Referring Physician: Michelle Griggs PA-C, [address] [facility] [address] / [place]*     Chief Complaint: Neck pain  Interval History: At her last office visit no changes were made    Today she reports no major changes since last seen. She is planning to get a new motorized wheelchair soon and is going to start PT, which she is hopeful will help with her back pain.  I did discuss that her last 2 oral toxicology's have been negative for buprenorphine.  She states that she changes this patch every Tuesday, and does not know why it would be negative.  She is wearing her patch currently on her right arm. She continues to report improvement in pain and function with minimal side effects from current Percocet and Belbuca    Brief History of Pain: Ms. Rita Sabillon is a 54 y.o. female with a PMHx of cerebral palsy, asthma who presents today for follow up regarding chronic neck pain and muscle spasms.            Current Pain Medications: Percocet 7.5/325 mg q8h PRN, Butrans patch 5 mcg/hr, Baclofen 20 mg TID, Cyclobenzaprine 5 mg q8h PRN, Diclofenac 50 mg BID, Gabapentin 600 mg TID  Previously Tried Pain Medications:    Relevant Surgeries: Leg muscle release surgeries in the , extensive spine fusion in the   Injections: She reports getting injections in her neck in the past but they stopped working.  Physical/Occupational Therapy: The patient does not wish to pursue PT/OT at this time.    Medications:   Current Outpatient Medications   Medication Instructions    albuterol 90 mcg/actuation inhaler 2 puffs, inhalation, Every 6 hours PRN    albuterol 90 mcg/actuation inhaler 2 puffs, inhalation, Every 4 hours PRN    albuterol 90 mcg/actuation inhaler 2 puffs, inhalation, Every 6 hours PRN    Arnuity Ellipta 200 mcg/actuation inhaler INHALE  ONE BLISTER WITH DEVICE ONCE A DAY    aspirin 81 mg, oral, Daily    atorvastatin (LIPITOR) 20 mg, oral, Daily    baclofen (Lioresal) 20 mg tablet 1 TAB BY MOUTH THREE TIMES A DAY    blood sugar diagnostic (Blood Glucose Test) strip Use to check your blood sugar twice daily    blood-glucose meter misc use to test blood sugar three times a day    [START ON 1/13/2025] buprenorphine (Butrans) 5 mcg/hour patch 1 patch, transdermal, Once Weekly    cetirizine (ZYRTEC) 10 mg, oral, Daily    cyclobenzaprine (Flexeril) 5 mg tablet 1 TAB BY MOUTH EVERY 8 HOURS AS NEEDED    Dexcom G7  misc Use as instructed    Dexcom G7 Sensor device For continuous glucose monitoring.  Change every 10 days.    diclofenac (Voltaren) 50 mg EC tablet Take 1 tablet (50 mg) by mouth once daily.    docusate sodium (Enemeez) 283 mg/5 mL enema 1 RECTALLY ONCE A DAY    doxepin (SINEquan) 50 mg capsule 1 CAP BY MOUTH DAILY AT BEDTIME    ergocalciferol (VITAMIN D-2) 1,250 mcg, oral, Once Weekly    famotidine (Pepcid) 40 mg tablet Take 1 tablet (40 mg) by mouth 2 times a day.    FeroSuL tablet 1 tablet, oral, Daily    fluconazole (DIFLUCAN) 150 mg, Once    fluticasone (Flonase) 50 mcg/actuation nasal spray 2 SPRAYS NASALLY ONCE A DAY AS DIRECTED    gabapentin (Neurontin) 600 mg tablet 1 TAB BY MOUTH THREE TIMES A DAY    lancets misc use to test blood sugar three times a day    metFORMIN (GLUCOPHAGE) 500 mg, oral, 2 times daily before meals    metoprolol succinate XL (TOPROL-XL) 25 mg, oral, Daily    montelukast (SINGULAIR) 10 mg, oral, Daily    Motegrity 2 mg, 2 times daily    Myrbetriq 25 mg tablet extended release 24 hr 24 hr tablet TAKE ONE TABLET BY MOUTH ONCE DAILY Oral for 30    naloxone (Narcan) 4 mg/0.1 mL nasal spray ADMINISTER A SINGLE SPRAY INTRANASALLY INTO ONE NOSTRIL. CALL 911. MAY REPEAT X 1.    naratriptan (Amerge) 2.5 mg tablet 1 TAB BY MOUTH AS NEEDED AT ONSET OF MIGRAINE.;MAY REPEAT DOSE ONCE AFTER 4 HOURS IF NEEDED    nystatin  (Mycostatin) 100,000 unit/gram powder APPLY TOPICALLY TO AFFECTED AREA TWICE A DAY AS DIRECTED    oxybutynin (Ditropan) 5 mg tablet 1 TAB BY MOUTH THREE TIMES A DAY    [START ON 2/12/2025] oxyCODONE-acetaminophen (Percocet) 7.5-325 mg tablet 1 tablet, oral, Every 8 hours PRN    [START ON 1/13/2025] oxyCODONE-acetaminophen (Percocet) 7.5-325 mg tablet 1 tablet, oral, Every 8 hours PRN    pantoprazole (ProtoNix) 40 mg EC tablet Every 24 hours    polyethylene glycol (Glycolax, Miralax) 17 gram/dose powder     spironolactone (ALDACTONE) 25 mg, oral, Daily    SSD 1 % cream APPLY TO AFFECTED AREAS ONCE A DAY    venlafaxine XR (EFFEXOR-XR) 75 mg, oral, Daily      Allergies:   No Known Allergies      Past Medical & Surgical History:  Past Medical History:   Diagnosis Date    Abnormal urine sediment 06/11/2019    Asthma     Cellulitis of right leg 08/01/2019    Cerebral palsy     Chronic pain syndrome 05/12/2021    Conjunctivitis 12/05/2023    Dyspnea 12/05/2023    Blanco catheter present 12/12/2020    Jaskaran hematuria 12/05/2023    Hemorrhagic cystitis 10/14/2021    Left ankle sprain 02/04/2020    Low back pain, unspecified 12/05/2023    Neuropathy     HANDS AND FEET    Osteoarthritis     Type 2 diabetes mellitus     Urinary pain 03/09/2020    Urinary tract infection 09/17/2020      Past Surgical History:   Procedure Laterality Date    OTHER SURGICAL HISTORY  07/13/2022    Back surgery    OTHER SURGICAL HISTORY  07/13/2022    Gallbladder surgery    OTHER SURGICAL HISTORY  07/20/2022    Suprapubic catheter insertion       No family history on file.  Social History     Socioeconomic History    Marital status: Single     Spouse name: Not on file    Number of children: Not on file    Years of education: Not on file    Highest education level: Not on file   Occupational History    Not on file   Tobacco Use    Smoking status: Never    Smokeless tobacco: Never   Vaping Use    Vaping status: Never Used   Substance and Sexual Activity  "   Alcohol use: Never    Drug use: Never    Sexual activity: Not on file   Other Topics Concern    Not on file   Social History Narrative    Not on file     Social Drivers of Health     Financial Resource Strain: Low Risk  (8/7/2024)    Overall Financial Resource Strain (CARDIA)     Difficulty of Paying Living Expenses: Not very hard   Food Insecurity: Not on file   Transportation Needs: No Transportation Needs (8/7/2024)    PRAPARE - Transportation     Lack of Transportation (Medical): No     Lack of Transportation (Non-Medical): No   Physical Activity: Not on file   Stress: Not on file   Social Connections: Not on file   Intimate Partner Violence: Not on file   Housing Stability: Low Risk  (8/7/2024)    Housing Stability Vital Sign     Unable to Pay for Housing in the Last Year: No     Number of Times Moved in the Last Year: 1     Homeless in the Last Year: No       Problems, Past medical history, past surgical history, Medications, allergies, social and family history reviewed and as per the electronic medical record from today's encounter    Review of Systems:  CONST: No fever, chills, fatigue, weight changes  EYES: No loss of vision  ENT: No hearing loss, tinnitus  CV: No chest pain, palpitations  RESP: No dyspnea, shortness of breath, cough  GI: No stool incontinence, nausea, vomiting  : No urinary incontinence  MSK: No joint swelling  SKIN: No rash, no hives  NEURO: No headache, dizziness  PSYCH: No anxiety, depression  HEM/LYMPH: No easy bruising or bleeding  All other systems reviewed are negative     Physical Exam:  Vitals: /64   Pulse 99   Resp 18   Ht 1.448 m (4' 9\")   Wt 72.6 kg (160 lb)   LMP  (LMP Unknown)   SpO2 92%   BMI 34.62 kg/m²   General: No apparent distress. Alert, appropriate, oriented x 3. Mood generally positive, affect congruent. Speaking in full sentences.   HENT: Normocephalic, atraumatic. Hearing intact.  Eyes: Pupils equal and round  Neck: Supple, trachea " midline  Lungs: Symmetric respiratory excursion on visual exam, nonlabored breathing.   Extremities: No cyanosis noted in extremities.  Skin: No rashes, lesions noted.  Neuro: Alert and appropriate. Using a motorized wheelchair.    Laboratory Data:  The following laboratory data were reviewed during this visit:   Lab Results   Component Value Date    WBC 10.7 2024    RBC 4.72 2024    HGB 14.1 2024    HCT 42.8 2024     2024      Lab Results   Component Value Date    INR 1.0 10/08/2021     Lab Results   Component Value Date    CREATININE 0.31 (L) 2024    HGBA1C 5.8 2024       Imaging:  The following imaging impressions were reviewed by me during this visit:    - Imagin CT cervical spine shows DDD at C5-C6 with no significant canal stenosis seen. Mild cervical spondylosis resulting in mild narrowing of left-sided neural foramina at C5-C6 and C6-C7. Postoperative changes of the included upper thoracic spine.    I also personally reviewed the images from the above studies myself. These images and my interpretation of them contributed to the management and decision making of the patient's medical plan.    ASSESSMENT:  Ms. Rita Sabillon is a 54 y.o. female with neck pain that is consistent with:    1. 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 Long-term current use of opiate analgesic          PLAN:  Radiology: No new diagnostics at this time    Physically: No changes at this time    Psychologically: No needs at this time    Medication: No changes to medication made today. Refills for Percocet and Butrans provided today.  It is unclear why her past 2 oral toxicology's have been negative for buprenorphine.  I suspect this is more related to the low dose of the patch combined with timing of the testing relative to when she swaps her patches out.  I have a low suspicion for medication misuse.  Will continue to monitor closely    Duration: Multiple  years    Intervention: No plans for intervention.    I will refill the patient's opioids today for 2 months.  The patient continues to see benefit and improvement in their quality of life and ability to maintain ADLs.  Patient educated about the risks of taking opioids and operating a motor vehicle.  Patient reports no adverse side effects to current medication regimen.  Current regimen does allow patient to maintain ADLs.  Patient reports no new neurologic symptoms, new pain areas, or exacerbation in pain today.  Patient reports they are happy with current treatment care path.    [place] was reviewed and was consistent with the history.    Patient has been educated on the risks, benefits, and alternatives of controlled substances as well as the proper way to store these medications.  The patient and I discussed the nature of this medication and its side effects.  We discussed tolerance, physical dependence, psychological dependence, addiction and opioid-induced hyperalgesia.  We discussed the potential need to wean from this medication.  We discussed the availability of programs that can help with this process if necessary.  We discussed safety issues related to opioids including safe storage.  We discussed the fact that the patient should not drive an automobile or operate heavy machinery while taking this medication.  A prescription for naloxone was offered to the patient.  The patient will be re-evaluated for the need to continue opioid therapy in 60-90 days.  A Pill Count Was Complete today and was consistent with prescriptions filled.  Most recent Toxicology reviewed and negative for buprenophrine. See discussion above. Will continue to monitor closely      Sincerely,  Sunil Morrow MD  [facility] [place]

## 2025-01-07 NOTE — PROGRESS NOTES
"Physical Therapy Treatment Note    Patient Name: Rita Sabillon  MRN: [id number]  Evaluating Clinician: CATALINA Clayton PT  Encounter date:  1/7/2025  Time Calculation  Start Time: 0200  Stop Time: 0245  Time Calculation (min): 45 min     PT Therapeutic Procedures Time Entry  Therapeutic Exercise Time Entry: 40      INSURANCE:  Visit Number: 4  [place]ved Visits: [place]  Insurance Info: 1) [facility] MEDICARE DUAL COMPLETE FULL - NO AUTH - $240 DEDUCT not met / $8850 OOP not met / 80% coins / [id number]    2) [place] MEDICAID - Active / 100% contracted rate / Per RTE / ds 12/6/24.     Referral: spastic quadriplegia, bilateral arm weakness, 11/22/24 Michelle Griggs PA-C    PRECAUTIONS/SPECIAL CONCERNS/RELEVANT PMHX: neuropathy, cerebral palsy, DM2, LBP, spastic quadriplegia, osteoporosis, spina bifida, PVD, suprapubic catheter dysfunction, history \"rotator cuff issues\" \"I have rotator cuff tears on both sides, Migraine, muscle tension pain, Remote childhood history of adductor and bilateral hamstring \"release\", extensive spinal fusion thoracolumbar 1985, chronic pain syndrome seeing pain management,     Meds: baclofen, albuterol, lipitor, butrans, zyrtec, flexeril, gabapentin, metformin, toprol, singulaire, naloxone (narcan), effexor,       PT CLINICAL PROBLEM: decreased arm and shoulder strength and difficulty to roll in bed and left hip pain 5/10 at present.       Chief Medical Dx code:   1. Spastic quadriplegic cerebral palsy (Multi)  Follow Up In Physical Therapy      2. Bilateral arm weakness  Follow Up In Physical Therapy            SUBJECTIVE: Having more trouble turning in bed. Left lateral hip pain. Lives alone. Owns a home. Gets daily aides 2 hours in am and 6 hours in the evening. She has a retired service dog (10 years old) and is working on getting a new service dog. She sleeps on a hospital bed. Presents in a motorized  power scooter and is trying to make arrangements for a new updated " "powerscooter. She has a lift to get in and out of the bed and shower chair. She uses a lift to get out of her power chair. Goals to be able to roll in bed better to help her aides and have better arm strength. She has a handicap bathroom, ramps and accessible home situation. She has an electric . Her aides get her food. She enjoys cooking and baking. She did walk some until she was 12 years old. Is non ambulatory and requires assist/drew lift. \"Getting my arms and shoulders stronger would be helpful\" She uses [facility] medical appointments. She does get left hip pain when being transferred by her aides. Her left hip has been hurting for the past 6 months no trauma. She uses a catheter. Her powerscooter does elevate, lean backwards, and can recline to almost horizontal for re-distribution of body weight pressure sore prevention.     11/7/25 has a PT wheel chair eval scheduled at [facility] on 1/20/25 with a PT and wheelchair company [facility] at 1:15 pm. Got sick over Barbie time. Her left hip was feeling better somewhat with PT but the last 3-4 days painful again. Sleeping mostly on left side gets right hip pain when sleeping on right side and has not been able to do that for many years.     Symptoms/NPRS before Rx: left lateral hip 5/10  Symptoms/NPRS after Rx: 5      TREATMENT:   Arrangements for wheelchair assessment reviewed in detail with patient  prom left knee and hip and LUE and RUE   Left shoulder IR/ER, elbow, wrist, forearm  Right shoulder IR ER elbow rom wrist and forearm  Prom to all 4 extremities    OBJECTIVE:  STEADI Fall Risk: 0 (score of 4+ indicates fall risk)   OUTCOME TOOL:  PSFS 10%  Right shoulder elevation 90, Elbow flexion 100 and elbow extension -25 and shoulder strength 3+ with noted spasticity  Left shoulder elevation 120, elbow flexion 110 and elbow extension -20 and shoulder strength 3+ and spasticity  Not able to sit independently needs back rest  Fully reliant on " powerscooter and drew lift transfers  Not able to transfer  Not able to stand and/or walk  Able to roll somewhat to the left side only from back lying  Has > 90 degrees neck rotational rom  Independent with use of powerscooter  Supination 45 degrees each  Does have functional finger and hand dexterity and able to operate scooter with hands  Good medical historian and conversant and appropriate  Right knee has passive motion only 60 to -30  Right hip 20-30 degrees motion and okay  Left hip seated 10 degrees of hip motion with notable lateral hip pain  Left knee rom 40 - 50 ( 10 degrees only)   Has trace movement in the feet  Feet and legs are atrophic/small due to disuse state of paralysis    12/18/24 left knee flex 20 and right knee 30 degrees    ASSESSMENT: Able to sit more upright today and straighter due to better left knee flexion to about 35 degrees.      PLAN: As wheel chair seating assessment scheduled. Arrangements being made for powerscooter/wheelchair assessment contacted [facility] and PCP and got an rx and pt. Provided needed contact information for [facility] to phone to arrange consult with both parties present hopefully in January. PT once per week for rom to Ue's, UE exercises, left hip rom while in scooter and any other measures that would be of help functionally or symptomatically with the aim of improving bed mobility to roll to the left side to off weight her pressure points in an effort to prevent pressure sores and enable some enhanced basic functional mobility.      Patient/parent/caregiver agreed and consented to plan of care for skilled physical therapy at 1 times per week for 6-12 weeks. Physical Therapy to include modalities prn as indicated, therapeutic exercise, manual therapy, neuromuscular re-education, gait and functional performance training, instruction and practice in a home exercise program (HEP) and self-management skills.       CARE PLAN/GOALS: ( A = Achieved,  NA = Not Achieved, P = Progressing toward goal needs additional skilled PT)    STG's: (      weeks /      visits )  1  2    LTG's  (   12   weeks /      visits )  1 able to roll backlying at home in her hospital bed to left side  2 able to elevate the shoulders > 120 degrees and UE strength will improve to 4-/5  3  4  5 The Global Rating of Change Score (GROC) will improve to 5/7 =  “a good deal better” or more.   6 Improve functional outcome tool score (FOTS: ARIADNA, NDI, ASES, ABC, etc.) by > 10 points for Minimally Important Clinical Difference (MICD).  7 Patient/client will be independent with a HEP and self-management skills to further enhance recovery and progress.  8 Patient/client will verbalize >75% symptom reduction for frequency and severity of symptoms and/or > two point reduction of VAS/NPRS.  9 The Patient Specific Functional Scale metric (PSFS) will improve from baseline value to greater than 80% functional.

## 2025-01-08 ENCOUNTER — OFFICE VISIT (OUTPATIENT)
Dept: PAIN MEDICINE | Facility: CLINIC | Age: 55
End: 2025-01-08
Payer: COMMERCIAL

## 2025-01-08 VITALS
SYSTOLIC BLOOD PRESSURE: 126 MMHG | OXYGEN SATURATION: 92 % | WEIGHT: 160 LBS | HEIGHT: 57 IN | BODY MASS INDEX: 34.52 KG/M2 | RESPIRATION RATE: 18 BRPM | HEART RATE: 99 BPM | DIASTOLIC BLOOD PRESSURE: 64 MMHG

## 2025-01-08 DIAGNOSIS — G80.0 SPASTIC QUADRIPARESIS SECONDARY TO CEREBRAL PALSY (MULTI): Primary | ICD-10-CM

## 2025-01-08 DIAGNOSIS — Z79.891 LONG-TERM CURRENT USE OF OPIATE ANALGESIC: ICD-10-CM

## 2025-01-08 PROCEDURE — 3074F SYST BP LT 130 MM HG: CPT | Performed by: STUDENT IN AN ORGANIZED HEALTH CARE EDUCATION/TRAINING PROGRAM

## 2025-01-08 PROCEDURE — 99214 OFFICE O/P EST MOD 30 MIN: CPT | Performed by: STUDENT IN AN ORGANIZED HEALTH CARE EDUCATION/TRAINING PROGRAM

## 2025-01-08 PROCEDURE — 3008F BODY MASS INDEX DOCD: CPT | Performed by: STUDENT IN AN ORGANIZED HEALTH CARE EDUCATION/TRAINING PROGRAM

## 2025-01-08 PROCEDURE — G2211 COMPLEX E/M VISIT ADD ON: HCPCS | Performed by: STUDENT IN AN ORGANIZED HEALTH CARE EDUCATION/TRAINING PROGRAM

## 2025-01-08 PROCEDURE — 1036F TOBACCO NON-USER: CPT | Performed by: STUDENT IN AN ORGANIZED HEALTH CARE EDUCATION/TRAINING PROGRAM

## 2025-01-08 PROCEDURE — 3078F DIAST BP <80 MM HG: CPT | Performed by: STUDENT IN AN ORGANIZED HEALTH CARE EDUCATION/TRAINING PROGRAM

## 2025-01-08 RX ORDER — OXYCODONE AND ACETAMINOPHEN 7.5; 325 MG/1; MG/1
1 TABLET ORAL EVERY 8 HOURS PRN
Qty: 90 TABLET | Refills: 0 | Status: SHIPPED | OUTPATIENT
Start: 2025-01-13 | End: 2025-02-12

## 2025-01-08 RX ORDER — BUPRENORPHINE 5 UG/H
1 PATCH TRANSDERMAL
Qty: 4 PATCH | Refills: 1 | Status: SHIPPED | OUTPATIENT
Start: 2025-01-13 | End: 2025-03-10

## 2025-01-08 RX ORDER — OXYCODONE AND ACETAMINOPHEN 7.5; 325 MG/1; MG/1
1 TABLET ORAL EVERY 8 HOURS PRN
Qty: 90 TABLET | Refills: 0 | Status: SHIPPED | OUTPATIENT
Start: 2025-02-12 | End: 2025-03-14

## 2025-01-08 ASSESSMENT — PAIN DESCRIPTION - DESCRIPTORS: DESCRIPTORS: ACHING;PINS AND NEEDLES

## 2025-01-08 ASSESSMENT — PAIN - FUNCTIONAL ASSESSMENT: PAIN_FUNCTIONAL_ASSESSMENT: 0-10

## 2025-01-08 ASSESSMENT — PAIN SCALES - GENERAL
PAINLEVEL_OUTOF10: 5
PAINLEVEL_OUTOF10: 5 - MODERATE PAIN

## 2025-01-08 NOTE — PROGRESS NOTES
MEDICATION NAME: Buprenorpine  STRENGTH: 5mcg/hr  LAST FILL DATE: 10/15/24  DATE LAST TAKEN: 25  QUANTITY FILLED: 4  QUANTITY REMAININ  COUNT COMPLETED BY: ROULA GRANADO RN and SONNY LEHMAN     MEDICATION NAME: Percocet  STRENGTH: 7.5/325mg  LAST FILL DATE: 10/15/24  DATE LAST TAKEN: 25  QUANTITY FILLED: 90  QUANTITY REMAININ  COUNT COMPLETED BY: ROULA GRANADO RN and SONNY LEHMAN       UDS LAST COMPLETED:   CONTROLLED SUBSTANCES AGREEMENT LAST SIGNED:   ORT LAST COMPLETED:  Modified [employer] disability form filled out annually.     UDS LAST COMPLETED:   CONTROLLED SUBSTANCES AGREEMENT LAST SIGNED:   ORT LAST COMPLETED:  Modified [employer] disability form filled out annually.

## 2025-01-10 ENCOUNTER — APPOINTMENT (OUTPATIENT)
Dept: PHYSICAL THERAPY | Facility: CLINIC | Age: 55
End: 2025-01-10
Payer: COMMERCIAL

## 2025-01-10 ENCOUNTER — TELEPHONE (OUTPATIENT)
Dept: PAIN MEDICINE | Facility: CLINIC | Age: 55
End: 2025-01-10
Payer: COMMERCIAL

## 2025-01-10 NOTE — TELEPHONE ENCOUNTER
Pt calling stating she is out of Percocet today because she is taking up to 3 tablets everyday. Pt asking if she can send I nher medication today instead of 01/13/2025.

## 2025-01-11 NOTE — TELEPHONE ENCOUNTER
Her medication was filled 12/14 so her next refill is not due until 1/13. She should not be out of the medication already if only taking it 3 times per day. She will have to wait until that date

## 2025-01-15 ENCOUNTER — TREATMENT (OUTPATIENT)
Dept: PHYSICAL THERAPY | Facility: CLINIC | Age: 55
End: 2025-01-15
Payer: COMMERCIAL

## 2025-01-15 DIAGNOSIS — R29.898 BILATERAL ARM WEAKNESS: ICD-10-CM

## 2025-01-15 DIAGNOSIS — G80.0 SPASTIC QUADRIPLEGIC CEREBRAL PALSY (MULTI): ICD-10-CM

## 2025-01-15 PROCEDURE — 97110 THERAPEUTIC EXERCISES: CPT | Mod: GP

## 2025-01-15 NOTE — PROGRESS NOTES
"Physical Therapy Treatment Note    Patient Name: Rita Sabillon  MRN: [id number]  Evaluating Clinician: CATALINA Clayton PT  Encounter date:  1/15/2025  Time Calculation  Start Time: 0100  Stop Time: 0200  Time Calculation (min): 60 min     PT Therapeutic Procedures Time Entry  Therapeutic Exercise Time Entry: 45      INSURANCE:  Visit Number: 5  [place]ved Visits: [place]  Insurance Info: 1) [facility] MEDICARE DUAL COMPLETE FULL - NO AUTH - $240 DEDUCT not met / $8850 OOP not met / 80% coins [id number]    2) [place] - Active / 100% contracted rate / Per RTE / ds 12/6/24.     Referral: spastic quadriplegia, bilateral arm weakness, 11/22/24 Michelle Griggs PA-C    PRECAUTIONS/SPECIAL CONCERNS/RELEVANT PMHX: neuropathy, cerebral palsy, DM2, LBP, spastic quadriplegia, osteoporosis, spina bifida, PVD, suprapubic catheter dysfunction, history \"rotator cuff issues\" \"I have rotator cuff tears on both sides, Migraine, muscle tension pain, Remote childhood history of adductor and bilateral hamstring \"release\", extensive spinal fusion thoracolumbar 1985, chronic pain syndrome seeing pain management,     Meds: baclofen, albuterol, lipitor, butrans, zyrtec, flexeril, gabapentin, metformin, toprol, singulaire, naloxone (narcan), effexor,       PT CLINICAL PROBLEM: decreased arm and shoulder strength and difficulty to roll in bed and left hip pain 5/10 at present.       Chief Medical Dx code:   1. Spastic quadriplegic cerebral palsy (Multi)  Follow Up In Physical Therapy      2. Bilateral arm weakness  Follow Up In Physical Therapy              SUBJECTIVE: Having more trouble turning in bed. Left lateral hip pain. Lives alone. Owns a home. Gets daily aides 2 hours in am and 6 hours in the evening. She has a retired service dog (10 years old) and is working on getting a new service dog. She sleeps on a hospital bed. Presents in a motorized  power scooter and is trying to make arrangements for a new updated " "powerscooter. She has a lift to get in and out of the bed and shower chair. She uses a lift to get out of her power chair. Goals to be able to roll in bed better to help her aides and have better arm strength. She has a handicap bathroom, ramps and accessible home situation. She has an electric . Her aides get her food. She enjoys cooking and baking. She did walk some until she was 12 years old. Is non ambulatory and requires assist/drew lift. \"Getting my arms and shoulders stronger would be helpful\" She uses [facility] for medical appointments. She does get left hip pain when being transferred by her aides. Her left hip has been hurting for the past 6 months no trauma. She uses a catheter. Her powerscooter does elevate, lean backwards, and can recline to almost horizontal for re-distribution of body weight pressure sore prevention.     11/7/25 has a PT wheel chair eval scheduled at [facility] on 1/20/25 with a PT and wheelchair company [facility] at 1:15 pm. Got sick over Barbie time. Her left hip was feeling better somewhat with PT but the last 3-4 days painful again. Sleeping mostly on left side gets right hip pain when sleeping on right side and has not been able to do that for many years.     1/15/25 Patient reports no new problems or concerns at this time.       Symptoms/NPRS before Rx: left lateral hip 5/10  Symptoms/NPRS after Rx: 5      TREATMENT:   Rx per Domi Espinosa PT today:  Arrangements for wheelchair assessment reviewed in detail with patient  prom left knee and hip and LUE and RUE   Left shoulder IR/ER, elbow, wrist, forearm  Right shoulder IR ER elbow rom wrist and forearm  Prom to all 4 extremities  Moist heat to posterior knees to easy neural tension spasticity  Rocking motions to knees and hips and torso to lower spasticity for rom     OBJECTIVE:  STEADI Fall Risk: 0 (score of 4+ indicates fall risk)   OUTCOME TOOL:  PSFS 10%  Right shoulder elevation 90, Elbow flexion 100 and elbow extension " -25 and shoulder strength 3+ with noted spasticity  Left shoulder elevation 120, elbow flexion 110 and elbow extension -20 and shoulder strength 3+ and spasticity  Not able to sit independently needs back rest  Fully reliant on powerscooter and drew lift transfers  Not able to transfer  Not able to stand and/or walk  Able to roll somewhat to the left side only from back lying  Has > 90 degrees neck rotational rom  Independent with use of powerscooter  Supination 45 degrees each  Does have functional finger and hand dexterity and able to operate scooter with hands  Good medical historian and conversant and appropriate  Right knee has passive motion only 60 to -30  Right hip 20-30 degrees motion and okay  Left hip seated 10 degrees of hip motion with notable lateral hip pain  Left knee rom 40 - 50 ( 10 degrees only)   Has trace movement in the feet  Feet and legs are atrophic/small due to disuse state of paralysis    12/18/24 left knee flex 20 and right knee 30 degrees    ASSESSMENT: Tolerated well heat and rocking lowered neurological tone and allowed better mobility at the joints     PLAN: As wheel chair seating assessment scheduled. Arrangements being made for powerscooter/wheelchair assessment contacted [facility] and PCP and got an rx and pt. Provided needed contact information for [facility] to phone to arrange consult with both parties present hopefully in January. PT once per week for rom to Ue's, UE exercises, left hip rom while in scooter and any other measures that would be of help functionally or symptomatically with the aim of improving bed mobility to roll to the left side to off weight her pressure points in an effort to prevent pressure sores and enable some enhanced basic functional mobility.      Patient/parent/caregiver agreed and consented to plan of care for skilled physical therapy at 1 times per week for 6-12 weeks. Physical Therapy to include modalities prn as indicated,  therapeutic exercise, manual therapy, neuromuscular re-education, gait and functional performance training, instruction and practice in a home exercise program (HEP) and self-management skills.       CARE PLAN/GOALS: ( A = Achieved, NA = Not Achieved, P = Progressing toward goal needs additional skilled PT)    STG's: (      weeks /      visits )  1  2    LTG's  (   12   weeks /      visits )  1 able to roll backlying at home in her hospital bed to left side  2 able to elevate the shoulders > 120 degrees and UE strength will improve to 4-/5  3  4  5 The Global Rating of Change Score (GROC) will improve to 5/7 =  “a good deal better” or more.   6 Improve functional outcome tool score (FOTS: ARIADNA, NDI, ASES, ABC, etc.) by > 10 points for Minimally Important Clinical Difference (MICD).  7 Patient/client will be independent with a HEP and self-management skills to further enhance recovery and progress.  8 Patient/client will verbalize >75% symptom reduction for frequency and severity of symptoms and/or > two point reduction of VAS/NPRS.  9 The Patient Specific Functional Scale metric (PSFS) will improve from baseline value to greater than 80% functional.

## 2025-01-20 ENCOUNTER — APPOINTMENT (OUTPATIENT)
Dept: PHYSICAL THERAPY | Facility: CLINIC | Age: 55
End: 2025-01-20
Payer: COMMERCIAL

## 2025-01-28 ENCOUNTER — TREATMENT (OUTPATIENT)
Dept: PHYSICAL THERAPY | Facility: CLINIC | Age: 55
End: 2025-01-28
Payer: COMMERCIAL

## 2025-01-28 DIAGNOSIS — R29.898 BILATERAL ARM WEAKNESS: ICD-10-CM

## 2025-01-28 DIAGNOSIS — G80.0 SPASTIC QUADRIPLEGIC CEREBRAL PALSY (MULTI): ICD-10-CM

## 2025-01-28 PROCEDURE — 97110 THERAPEUTIC EXERCISES: CPT | Mod: GP

## 2025-01-28 NOTE — PROGRESS NOTES
"Physical Therapy Treatment Note    Patient Name: Rita Sabillon  MRN: [id number]  Evaluating Clinician: CATALINA Clayton PT  Encounter date:  1/28/2025  Time Calculation  Start Time: 0100  Stop Time: 0145  Time Calculation (min): 45 min     PT Therapeutic Procedures Time Entry  Therapeutic Exercise Time Entry: 42      INSURANCE:  Visit Number: 6  Approved Visits: [place]  Insurance Info: 1) [facility] MEDICARE DUAL COMPLETE FULL - NO AUTH - $240 DEDUCT not met / $8850 OOP not met / 80% coins / [id number]    2) [place] - Active / 100% contracted rate / Per RTE / ds 12/6/24.     Referral: spastic quadriplegia, bilateral arm weakness, 11/22/24 Michelle Griggs PA-C    PRECAUTIONS/SPECIAL CONCERNS/RELEVANT PMHX: neuropathy, cerebral palsy, DM2, LBP, spastic quadriplegia, osteoporosis, spina bifida, PVD, suprapubic catheter dysfunction, history \"rotator cuff issues\" \"I have rotator cuff tears on both sides, Migraine, muscle tension pain, Remote childhood history of adductor and bilateral hamstring \"release\", extensive spinal fusion thoracolumbar 1985, chronic pain syndrome seeing pain management,     Meds: baclofen, albuterol, lipitor, butrans, zyrtec, flexeril, gabapentin, metformin, toprol, singulaire, naloxone (narcan), effexor,       PT CLINICAL PROBLEM: decreased arm and shoulder strength and difficulty to roll in bed and left hip pain 5/10 at present.       Chief Medical Dx code:   1. Spastic quadriplegic cerebral palsy (Multi)  Follow Up In Physical Therapy      2. Bilateral arm weakness  Follow Up In Physical Therapy              SUBJECTIVE: Having more trouble turning in bed. Left lateral hip pain. Lives alone. Owns a home. Gets daily aides 2 hours in am and 6 hours in the evening. She has a retired service dog (10 years old) and is working on getting a new service dog. She sleeps on a hospital bed. Presents in a motorized  power scooter and is trying to make arrangements for a new updated " "powerscooter. She has a lift to get in and out of the bed and shower chair. She uses a lift to get out of her power chair. Goals to be able to roll in bed better to help her aides and have better arm strength. She has a handicap bathroom, ramps and accessible home situation. She has an electric . Her aides get her food. She enjoys cooking and baking. She did walk some until she was 12 years old. Is non ambulatory and requires assist/drew lift. \"Getting my arms and shoulders stronger would be helpful\" She uses [facility] for medical appointments. She does get left hip pain when being transferred by her aides. Her left hip has been hurting for the past 6 months no trauma. She uses a catheter. Her powerscooter does elevate, lean backwards, and can recline to almost horizontal for re-distribution of body weight pressure sore prevention.     11/7/25 has a PT wheel chair eval scheduled at [facility] on 1/20/25 with a PT and wheelchair company [facility] at 1:15 pm. Got sick over Barbie time. Her left hip was feeling better somewhat with PT but the last 3-4 days painful again. Sleeping mostly on left side gets right hip pain when sleeping on right side and has not been able to do that for many years.     1/15/25 Patient reports no new problems or concerns at this time.     1/28/25 Having seating assessment on 2/3/25      Symptoms/NPRS before Rx: 5  Symptoms/NPRS after Rx: 3      TREATMENT:   Prom to all 4 extremities in powerchair, rhythmic rocking, tone handling skills and aarom and education about drew lift and low kaycee and benefits of a more dedicated site with neuro based services for consideration.       OBJECTIVE:  STEADI Fall Risk: 0 (score of 4+ indicates fall risk)   OUTCOME TOOL:  PSFS 10%  Right shoulder elevation 90, Elbow flexion 100 and elbow extension -25 and shoulder strength 3+ with noted spasticity  Left shoulder elevation 120, elbow flexion 110 and elbow extension -20 and shoulder strength 3+ and " spasticity  Not able to sit independently needs back rest  Fully reliant on powerscooter and eboni lift transfers  Not able to transfer  Not able to stand and/or walk  Able to roll somewhat to the left side only from back lying  Has > 90 degrees neck rotational rom  Independent with use of powerscooter  Supination 45 degrees each  Does have functional finger and hand dexterity and able to operate scooter with hands  Good medical historian and conversant and appropriate  Right knee has passive motion only 60 to -30  Right hip 20-30 degrees motion and okay  Left hip seated 10 degrees of hip motion with notable lateral hip pain  Left knee rom 40 - 50 ( 10 degrees only)   Has trace movement in the feet  Feet and legs are atrophic/small due to disuse state of paralysis    12/18/24 left knee flex 20 and right knee 30 degrees    ASSESSMENT: States she almost always feels a little better and sits better post rom to all 4 extremities.      PLAN: Advised pt to discuss with [facility] the consideration of transferring to that location or [facility] if they have a low kaycee and Eboni lift as this would then enable staff and pt to work on more functional activities like bed mobility and have PT in sitting as well as supine 1/28/25. As wheel chair seating assessment scheduled. Arrangements being made for powerscooter/wheelchair assessment contacted [facility] and PCP and got an rx and pt. Provided needed contact information for [facility] to phone to arrange consult with both parties present hopefully in January. PT once per week for rom to Ue's, UE exercises, left hip rom while in scooter and any other measures that would be of help functionally or symptomatically with the aim of improving bed mobility to roll to the left side to off weight her pressure points in an effort to prevent pressure sores and enable some enhanced basic functional mobility.      Patient/parent/caregiver agreed and consented to plan of  care for skilled physical therapy at 1 times per week for 6-12 weeks. Physical Therapy to include modalities prn as indicated, therapeutic exercise, manual therapy, neuromuscular re-education, gait and functional performance training, instruction and practice in a home exercise program (HEP) and self-management skills.       CARE PLAN/GOALS: ( A = Achieved, NA = Not Achieved, P = Progressing toward goal needs additional skilled PT)    STG's: (      weeks /      visits )  1  2    LTG's  (   12   weeks /      visits )  1 able to roll backlying at home in her hospital bed to left side  2 able to elevate the shoulders > 120 degrees and UE strength will improve to 4-/5  3  4  5 The Global Rating of Change Score (GROC) will improve to 5/7 =  “a good deal better” or more.   6 Improve functional outcome tool score (FOTS: ARIADNA, NDI, ASES, ABC, etc.) by > 10 points for Minimally Important Clinical Difference (MICD).  7 Patient/client will be independent with a HEP and self-management skills to further enhance recovery and progress.  8 Patient/client will verbalize >75% symptom reduction for frequency and severity of symptoms and/or > two point reduction of VAS/NPRS.  9 The Patient Specific Functional Scale metric (PSFS) will improve from baseline value to greater than 80% functional.

## 2025-02-03 ENCOUNTER — CLINICAL SUPPORT (OUTPATIENT)
Dept: PHYSICAL THERAPY | Facility: CLINIC | Age: 55
End: 2025-02-03
Payer: COMMERCIAL

## 2025-02-03 DIAGNOSIS — G80.0 SPASTIC QUADRIPLEGIC CEREBRAL PALSY (MULTI): ICD-10-CM

## 2025-02-03 DIAGNOSIS — G80.9 CEREBRAL PALSY, UNSPECIFIED TYPE (MULTI): ICD-10-CM

## 2025-02-03 PROCEDURE — 97542 WHEELCHAIR MNGMENT TRAINING: CPT | Mod: GP | Performed by: PHYSICAL THERAPIST

## 2025-02-03 NOTE — PROGRESS NOTES
Physical Therapy Treatment    Patient Name: Rita Sabillon  MRN: [id number]  Today's Date: 2/3/2025  Time Calculation  Start Time: 1445  Stop Time: 1553  Time Calculation (min): 68 min  PT Therapeutic Procedures Time Entry  Wheelchair Management Time Entry: 68    Insurance:  Visit number: 1 of 1  Authorization info: 1) [facility] MEDICARE DUAL COMPLETE FULL - NO AUTH - $240 DEDUCT not met / $8850 OOP not met / 80% coins / [id number]  2) [place] MEDICA[place] - Active / 100% contracted rate / Per RTE / ds   Insurance Type: Payor: [employer] DUAL COMPLETE / Plan: UNITED [employer]ARE DUAL COMPLETE / Product Type: *No Product type* /     Current Problem   1. Spastic quadriplegic cerebral palsy (Multi)  Referral to Physical Therapy      2. Cerebral palsy, unspecified type (Multi)  Referral to Physical Therapy        Subjective Report:     Pt presents to therapy this date in order to obtain wheelchair evaluation. Pt currently has motorized wheelchair that is over 5 years old; originally obtained in Oct of 2019 from [employer]. Pt reports home set up is accessible for wheelchair and ADA compliant with ramp entry and Voyage lift. She has aids 2 hours in am and approximately 7 hours in evening but is home most of the day by herself. She does have a retired service dog and active service dog. Her currently wheelchair has tilt/recline, power elevation leg and reach assist. She reports in the past she had a chair to stand feature that allowed greater independence at home, WB for osteoporosis and volunteering at local [employer].     Pain: Pain throughout back and hips, best rating 4/10, worst 8/10, managed with medication    PMHx: Spastic quadriplegia, cerebral palsy, DM2, spinal bifida, suprapubic catheter, scoliosis with surgical correction and full fusion of spine with exception of C1-5, B RTC tearing due to spasticity; Osteoporosis    Objective Report: see wheelchair assessment  form    Assessment:  Pt would benefit from new motorized wheelchair to maintain as much independence and promote optimal health as able. Pt has congential cerebral palsy with spastic quadriplegia and has been non ambulatory for majority of her life. She demonstrates reduced core control with postural weakness and greater spasticity of L side creating seated shift toward L side and unequal pressure throughout ischial tuberosity as well as throughout L spinal structures creating greater pressure for ulcers. Power elevation of leg rest and tilt in space/recline features will further assist in care of spasticity and pressure redistribution. Standing required for improved independence with meal prep and grooming tasks as well as weight bearing for bone health due to osteoporosis; knee bolster and chest strap required for trunk control and achievement of upright position. Custom cushioning will be required to help achieve neutral spine alinement and postural positioning to again prevent pressure ulcers. Further modifications of wheelchair would be required such as backpack gauthier for carrying of medical equipment including catheter.

## 2025-02-22 DIAGNOSIS — G80.0 SPASTIC QUADRIPLEGIC CEREBRAL PALSY (MULTI): ICD-10-CM

## 2025-02-24 RX ORDER — GABAPENTIN 600 MG/1
600 TABLET ORAL 3 TIMES DAILY
Qty: 90 TABLET | Refills: 2 | Status: SHIPPED | OUTPATIENT
Start: 2025-02-24

## 2025-03-04 NOTE — PROGRESS NOTES
[facility]  Follow Up Office Visit Note 3/5/2025    Patient Information: Rita Sabillon, MRN: [id number], : 1970   Primary Care/Referring Physician: Michelle Griggs PA-C, [address] [facility] [address] / [place]*     Chief Complaint: Neck pain  Interval History: At her last office visit no changes were made    Today she reports no major changes since last seen.     For reference, I did discuss that her last 2 oral toxicology's have been negative for buprenorphine.  She states that she changes this patch every Tuesday, and does not know why it would be negative. She continues to report improvement in pain and function with minimal side effects from current Percocet and Belbuca    Brief History of Pain: Ms. Rita Sabillon is a 54 y.o. female with a PMHx of cerebral palsy, asthma who presents today for follow up regarding chronic neck pain and muscle spasms.            Current Pain Medications: Percocet 7.5/325 mg q8h PRN, Butrans patch 5 mcg/hr, Baclofen 20 mg TID, Cyclobenzaprine 5 mg q8h PRN, Diclofenac 50 mg BID, Gabapentin 600 mg TID  Previously Tried Pain Medications:    Relevant Surgeries: Leg muscle release surgeries in the , extensive spine fusion in the   Injections: She reports getting injections in her neck in the past but they stopped working.  Physical/Occupational Therapy: The patient does not wish to pursue PT/OT at this time.    Medications:   Current Outpatient Medications   Medication Instructions    albuterol 90 mcg/actuation inhaler 2 puffs, inhalation, Every 6 hours PRN    albuterol 90 mcg/actuation inhaler 2 puffs, inhalation, Every 4 hours PRN    albuterol 90 mcg/actuation inhaler 2 puffs, inhalation, Every 6 hours PRN    Arnuity Ellipta 200 mcg/actuation inhaler INHALE ONE BLISTER WITH DEVICE ONCE A DAY    aspirin 81 mg, oral, Daily    atorvastatin (LIPITOR) 20 mg, oral, Daily    baclofen (Lioresal) 20 mg tablet 1 TAB BY MOUTH THREE  TIMES A DAY    blood sugar diagnostic (Blood Glucose Test) strip Use to check your blood sugar twice daily    blood-glucose meter misc use to test blood sugar three times a day    [START ON 3/11/2025] buprenorphine (Butrans) 5 mcg/hour patch 1 patch, transdermal, Once Weekly    cetirizine (ZYRTEC) 10 mg, oral, Daily    cyclobenzaprine (Flexeril) 5 mg tablet 1 TAB BY MOUTH EVERY 8 HOURS AS NEEDED    Dexcom G7  misc Use as instructed    Dexcom G7 Sensor device For continuous glucose monitoring.  Change every 10 days.    diclofenac (Voltaren) 50 mg EC tablet Take 1 tablet (50 mg) by mouth once daily.    docusate sodium (Enemeez) 283 mg/5 mL enema 1 RECTALLY ONCE A DAY    doxepin (SINEquan) 50 mg capsule 1 CAP BY MOUTH DAILY AT BEDTIME    ergocalciferol (VITAMIN D-2) 1,250 mcg, oral, Once Weekly    famotidine (Pepcid) 40 mg tablet Take 1 tablet (40 mg) by mouth 2 times a day.    FeroSuL tablet 1 tablet, oral, Daily    fluconazole (DIFLUCAN) 150 mg, Once    fluticasone (Flonase) 50 mcg/actuation nasal spray 2 SPRAYS NASALLY ONCE A DAY AS DIRECTED    gabapentin (NEURONTIN) 600 mg, oral, 3 times daily    lancets misc use to test blood sugar three times a day    metFORMIN (GLUCOPHAGE) 500 mg, oral, 2 times daily before meals    metoprolol succinate XL (TOPROL-XL) 25 mg, oral, Daily    montelukast (SINGULAIR) 10 mg, oral, Daily    Motegrity 2 mg, 2 times daily    Myrbetriq 25 mg tablet extended release 24 hr 24 hr tablet TAKE ONE TABLET BY MOUTH ONCE DAILY Oral for 30    naloxone (Narcan) 4 mg/0.1 mL nasal spray ADMINISTER A SINGLE SPRAY INTRANASALLY INTO ONE NOSTRIL. CALL 911. MAY REPEAT X 1.    naratriptan (Amerge) 2.5 mg tablet 1 TAB BY MOUTH AS NEEDED AT ONSET OF MIGRAINE.;MAY REPEAT DOSE ONCE AFTER 4 HOURS IF NEEDED    nystatin (Mycostatin) 100,000 unit/gram powder APPLY TOPICALLY TO AFFECTED AREA TWICE A DAY AS DIRECTED    oxybutynin (Ditropan) 5 mg tablet 1 TAB BY MOUTH THREE TIMES A DAY    [START ON  4/13/2025] oxyCODONE-acetaminophen (Percocet) 7.5-325 mg tablet 1 tablet, oral, Every 8 hours PRN    [START ON 3/14/2025] oxyCODONE-acetaminophen (Percocet) 7.5-325 mg tablet 1 tablet, oral, Every 8 hours PRN    pantoprazole (ProtoNix) 40 mg EC tablet Every 24 hours    polyethylene glycol (Glycolax, Miralax) 17 gram/dose powder     spironolactone (ALDACTONE) 25 mg, oral, Daily    SSD 1 % cream APPLY TO AFFECTED AREAS ONCE A DAY    venlafaxine XR (EFFEXOR-XR) 75 mg, oral, Daily      Allergies:   No Known Allergies      Past Medical & Surgical History:  Past Medical History:   Diagnosis Date    Abnormal urine sediment 06/11/2019    Asthma     Cellulitis of right leg 08/01/2019    Cerebral palsy     Chronic pain syndrome 05/12/2021    Conjunctivitis 12/05/2023    Dyspnea 12/05/2023    Blanco catheter present 12/12/2020    Jaskaran hematuria 12/05/2023    Hemorrhagic cystitis 10/14/2021    Left ankle sprain 02/04/2020    Low back pain, unspecified 12/05/2023    Neuropathy     HANDS AND FEET    Osteoarthritis     Type 2 diabetes mellitus     Urinary pain 03/09/2020    Urinary tract infection 09/17/2020      Past Surgical History:   Procedure Laterality Date    OTHER SURGICAL HISTORY  07/13/2022    Back surgery    OTHER SURGICAL HISTORY  07/13/2022    Gallbladder surgery    OTHER SURGICAL HISTORY  07/20/2022    Suprapubic catheter insertion       No family history on file.  Social History     Socioeconomic History    Marital status: Single     Spouse name: Not on file    Number of children: Not on file    Years of education: Not on file    Highest education level: Not on file   Occupational History    Not on file   Tobacco Use    Smoking status: Never    Smokeless tobacco: Never   Vaping Use    Vaping status: Never Used   Substance and Sexual Activity    Alcohol use: Never    Drug use: Never    Sexual activity: Not on file   Other Topics Concern    Not on file   Social History Narrative    Not on file     Social Drivers of  "Health     Financial Resource Strain: Low Risk  (8/7/2024)    Overall Financial Resource Strain (CARDIA)     Difficulty of Paying Living Expenses: Not very hard   Food Insecurity: Not on file   Transportation Needs: No Transportation Needs (8/7/2024)    PRAPARE - Transportation     Lack of Transportation (Medical): No     Lack of Transportation (Non-Medical): No   Physical Activity: Not on file   Stress: Not on file   Social Connections: Not on file   Intimate Partner Violence: Not on file   Housing Stability: Low Risk  (8/7/2024)    Housing Stability Vital Sign     Unable to Pay for Housing in the Last Year: No     Number of Times Moved in the Last Year: 1     Homeless in the Last Year: No       Problems, Past medical history, past surgical history, Medications, allergies, social and family history reviewed and as per the electronic medical record from today's encounter    Review of Systems:  CONST: No fever, chills, fatigue, weight changes  EYES: No loss of vision  ENT: No hearing loss, tinnitus  CV: No chest pain, palpitations  RESP: No dyspnea, shortness of breath, cough  GI: No stool incontinence, nausea, vomiting  : No urinary incontinence  MSK: No joint swelling  SKIN: No rash, no hives  NEURO: No headache, dizziness  PSYCH: No anxiety, depression  HEM/LYMPH: No easy bruising or bleeding  All other systems reviewed are negative     Physical Exam:  Vitals: /72   Pulse 83   Resp 18   Ht 1.448 m (4' 9\")   Wt 72.6 kg (160 lb)   LMP  (LMP Unknown)   SpO2 93%   BMI 34.62 kg/m²   General: No apparent distress. Alert, appropriate, oriented x 3. Mood generally positive, affect congruent. Speaking in full sentences.   HENT: Normocephalic, atraumatic. Hearing intact.  Eyes: Pupils equal and round  Neck: Supple, trachea midline  Lungs: Symmetric respiratory excursion on visual exam, nonlabored breathing.   Extremities: No cyanosis noted in extremities.  Skin: No rashes, lesions noted.  Neuro: Alert and " appropriate. Using a motorized wheelchair.    Laboratory Data:  The following laboratory data were reviewed during this visit:   Lab Results   Component Value Date    WBC 10.7 2024    RBC 4.72 2024    HGB 14.1 2024    HCT 42.8 2024     2024      Lab Results   Component Value Date    INR 1.0 10/08/2021     Lab Results   Component Value Date    CREATININE 0.31 (L) 2024    HGBA1C 5.8 2024       Imaging:  The following imaging impressions were reviewed by me during this visit:    - Imagin CT cervical spine shows DDD at C5-C6 with no significant canal stenosis seen. Mild cervical spondylosis resulting in mild narrowing of left-sided neural foramina at C5-C6 and C6-C7. Postoperative changes of the included upper thoracic spine.    I also personally reviewed the images from the above studies myself. These images and my interpretation of them contributed to the management and decision making of the patient's medical plan.    ASSESSMENT:  Ms. Rita Sabillon is a 54 y.o. female with neck pain that is consistent with:    1. Long-term current use of opiate analgesic    2. Spastic quadriparesis secondary to cerebral palsy (Multi)            PLAN:  Radiology: No new diagnostics at this time    Physically: No changes at this time    Psychologically: No needs at this time    Medication: No changes to medication made today. Refills for Percocet and Butrans provided today.  It is unclear why her past 2 oral toxicology's have been negative for buprenorphine.  I suspect this is more related to the low dose of the patch combined with timing of the testing relative to when she swaps her patches out.  I have a low suspicion for medication misuse.  A repeat oral toxicology was ordered today (she changed her patch yesterday)    Duration: Multiple years    Intervention: No plans for intervention.    I will refill the patient's opioids today for 2 months.  The patient continues to see  benefit and improvement in their quality of life and ability to maintain ADLs.  Patient educated about the risks of taking opioids and operating a motor vehicle.  Patient reports no adverse side effects to current medication regimen.  Current regimen does allow patient to maintain ADLs.  Patient reports no new neurologic symptoms, new pain areas, or exacerbation in pain today.  Patient reports they are happy with current treatment care path.    [place] was reviewed and was consistent with the history.    Patient has been educated on the risks, benefits, and alternatives of controlled substances as well as the proper way to store these medications.  The patient and I discussed the nature of this medication and its side effects.  We discussed tolerance, physical dependence, psychological dependence, addiction and opioid-induced hyperalgesia.  We discussed the potential need to wean from this medication.  We discussed the availability of programs that can help with this process if necessary.  We discussed safety issues related to opioids including safe storage.  We discussed the fact that the patient should not drive an automobile or operate heavy machinery while taking this medication.  A prescription for naloxone was offered to the patient.  The patient will be re-evaluated for the need to continue opioid therapy in 60-90 days.  A Pill Count Was Complete today and was consistent with prescriptions filled.  Most recent Toxicology reviewed and negative for buprenophrine. See discussion above. Will continue to monitor closely      Sincerely,  Sunil Morrow MD  [facility] - [place]

## 2025-03-05 ENCOUNTER — OFFICE VISIT (OUTPATIENT)
Dept: PAIN MEDICINE | Facility: CLINIC | Age: 55
End: 2025-03-05
Payer: COMMERCIAL

## 2025-03-05 VITALS
WEIGHT: 160 LBS | HEART RATE: 83 BPM | BODY MASS INDEX: 34.52 KG/M2 | SYSTOLIC BLOOD PRESSURE: 126 MMHG | RESPIRATION RATE: 18 BRPM | OXYGEN SATURATION: 93 % | HEIGHT: 57 IN | DIASTOLIC BLOOD PRESSURE: 72 MMHG

## 2025-03-05 DIAGNOSIS — G80.0 SPASTIC QUADRIPARESIS SECONDARY TO CEREBRAL PALSY (MULTI): ICD-10-CM

## 2025-03-05 DIAGNOSIS — Z79.891 LONG-TERM CURRENT USE OF OPIATE ANALGESIC: Primary | ICD-10-CM

## 2025-03-05 PROCEDURE — G2211 COMPLEX E/M VISIT ADD ON: HCPCS | Performed by: STUDENT IN AN ORGANIZED HEALTH CARE EDUCATION/TRAINING PROGRAM

## 2025-03-05 PROCEDURE — 1036F TOBACCO NON-USER: CPT | Performed by: STUDENT IN AN ORGANIZED HEALTH CARE EDUCATION/TRAINING PROGRAM

## 2025-03-05 PROCEDURE — 3074F SYST BP LT 130 MM HG: CPT | Performed by: STUDENT IN AN ORGANIZED HEALTH CARE EDUCATION/TRAINING PROGRAM

## 2025-03-05 PROCEDURE — 3078F DIAST BP <80 MM HG: CPT | Performed by: STUDENT IN AN ORGANIZED HEALTH CARE EDUCATION/TRAINING PROGRAM

## 2025-03-05 PROCEDURE — 3008F BODY MASS INDEX DOCD: CPT | Performed by: STUDENT IN AN ORGANIZED HEALTH CARE EDUCATION/TRAINING PROGRAM

## 2025-03-05 PROCEDURE — 99214 OFFICE O/P EST MOD 30 MIN: CPT | Performed by: STUDENT IN AN ORGANIZED HEALTH CARE EDUCATION/TRAINING PROGRAM

## 2025-03-05 RX ORDER — BUPRENORPHINE 5 UG/H
1 PATCH TRANSDERMAL
Qty: 4 PATCH | Refills: 1 | Status: SHIPPED | OUTPATIENT
Start: 2025-03-11 | End: 2025-05-06

## 2025-03-05 RX ORDER — OXYCODONE AND ACETAMINOPHEN 7.5; 325 MG/1; MG/1
1 TABLET ORAL EVERY 8 HOURS PRN
Qty: 90 TABLET | Refills: 0 | Status: SHIPPED | OUTPATIENT
Start: 2025-03-14 | End: 2025-04-13

## 2025-03-05 RX ORDER — OXYCODONE AND ACETAMINOPHEN 7.5; 325 MG/1; MG/1
1 TABLET ORAL EVERY 8 HOURS PRN
Qty: 90 TABLET | Refills: 0 | Status: SHIPPED | OUTPATIENT
Start: 2025-04-13 | End: 2025-05-13

## 2025-03-05 ASSESSMENT — PAIN - FUNCTIONAL ASSESSMENT: PAIN_FUNCTIONAL_ASSESSMENT: 0-10

## 2025-03-05 ASSESSMENT — PAIN SCALES - GENERAL
PAINLEVEL_OUTOF10: 7
PAINLEVEL_OUTOF10: 7

## 2025-03-05 ASSESSMENT — PAIN DESCRIPTION - DESCRIPTORS: DESCRIPTORS: THROBBING;ACHING

## 2025-03-05 ASSESSMENT — PATIENT HEALTH QUESTIONNAIRE - PHQ9
SUM OF ALL RESPONSES TO PHQ9 QUESTIONS 1 AND 2: 2
1. LITTLE INTEREST OR PLEASURE IN DOING THINGS: SEVERAL DAYS
2. FEELING DOWN, DEPRESSED OR HOPELESS: SEVERAL DAYS
10. IF YOU CHECKED OFF ANY PROBLEMS, HOW DIFFICULT HAVE THESE PROBLEMS MADE IT FOR YOU TO DO YOUR WORK, TAKE CARE OF THINGS AT HOME, OR GET ALONG WITH OTHER PEOPLE: SOMEWHAT DIFFICULT

## 2025-03-05 NOTE — PROGRESS NOTES
MEDICATION NAME: Butrans  STRENGTH: 5mcg/hr  LAST FILL DATE: 25  DATE LAST TAKEN: 3/4/25  QUANTITY FILLED: 4  QUANTITY REMAININ  COUNT COMPLETED BY: BRIANNA RIVAS RN and SONNY LEHMAN     MEDICATION NAME: Percocet  STRENGTH: 7.5/325mg  LAST FILL DATE: 25  DATE LAST TAKEN: 3/5/25  QUANTITY FILLED: 90  QUANTITY REMAININ  COUNT COMPLETED BY: BRIANNA RIVAS RN and SONNY LEHMAN       UDS LAST COMPLETED:   CONTROLLED SUBSTANCES AGREEMENT LAST SIGNED:   ORT LAST COMPLETED:  Modified [employer] disability form filled out annually.         UDS LAST COMPLETED:   CONTROLLED SUBSTANCES AGREEMENT LAST SIGNED:   ORT LAST COMPLETED:  Modified [employer] disability form filled out annually.

## 2025-03-10 ENCOUNTER — TELEPHONE (OUTPATIENT)
Dept: PAIN MEDICINE | Facility: CLINIC | Age: 55
End: 2025-03-10
Payer: COMMERCIAL

## 2025-03-10 DIAGNOSIS — Z79.891 LONG-TERM CURRENT USE OF OPIATE ANALGESIC: Primary | ICD-10-CM

## 2025-03-10 NOTE — TELEPHONE ENCOUNTER
Can you please put a new order in?  The original order looks like it was put in as [facility] instead of [facility] room temperature.  They wont accept the order put in.

## 2025-03-10 NOTE — TELEPHONE ENCOUNTER
Thank you.  Printed order.  Will Fax order as soon as [facility] [employer] gives us their Fax #.

## 2025-03-10 NOTE — TELEPHONE ENCOUNTER
calls asking for an order for this patient who had oral swab on 03/05/2025.  This is to be Faxed to them at :  [phone].

## 2025-03-11 ENCOUNTER — DOCUMENTATION (OUTPATIENT)
Dept: PHYSICAL THERAPY | Facility: CLINIC | Age: 55
End: 2025-03-11
Payer: COMMERCIAL

## 2025-03-11 LAB
DRUG SCREEN COMMENT UR-IMP: NORMAL
FENTANYL UR CFM-MCNC: NORMAL NG/ML
FENTANYL UR QL SCN: NORMAL
NORFENTANYL UR CFM-MCNC: NORMAL NG/ML
QUEST NOTES AND COMMENTS: NORMAL
QUEST PATIENT HISTORICAL REPORT: NORMAL

## 2025-03-11 NOTE — PROGRESS NOTES
Discharge Summary    Patient Name: Rita Sabillon  MRN Number: [id number]  Evaluating Clinician: CATALINA Clayton PT  Today's Date: 3/11/2025    Discharge Summary: PT        Rehab Discharge Reason: Failed to schedule and/or keep follow-up appointment(s)    Patient did get a wheelchair consult done at [facility] which was one of her primary goals in coming to therapy and the process or updating her powerscooter is ongoing at this time.

## 2025-03-15 LAB
6MAM SAL QL SCN: NEGATIVE NG/ML
AMPHETAMINES SAL QL SCN: NEGATIVE NG/ML
BARBITURATES SAL QL SCN: NEGATIVE NG/ML
BENZODIAZ SAL QL SCN: NEGATIVE NG/ML
BUPRENORPHINE SAL QL SCN: NEGATIVE NG/ML
CANNABINOIDS SAL QL SCN: NEGATIVE NG/ML
COCAINE SAL QL SCN: NEGATIVE NG/ML
CODEINE SAL CFM-MCNC: NEGATIVE NG/ML
COTININE SAL QL SCN: NEGATIVE NG/ML
DHC SAL CFM-MCNC: NEGATIVE NG/ML
FENTANYL SAL QL SCN: NEGATIVE NG/ML
HYDROCODONE SAL CFM-MCNC: NEGATIVE NG/ML
HYDROMORPHONE SAL CFM-MCNC: NEGATIVE NG/ML
MDMA SAL QL SCN: NEGATIVE NG/ML
MEPROBAMATE SAL QL SCN: NEGATIVE NG/ML
METHADONE SAL QL SCN: NEGATIVE NG/ML
MORPHINE SAL CFM-MCNC: NEGATIVE NG/ML
NORHYDROCODONE SAL CFM-MCNC: NEGATIVE NG/ML
NOROXYCODONE SAL CFM-MCNC: 14.3 NG/ML
OPIATES SAL QL SCN: POSITIVE NG/ML
OXYCODONE SAL CFM-MCNC: 56.5 NG/ML
OXYMORPHONE SAL CFM-MCNC: NEGATIVE NG/ML
PCP SAL QL SCN: NEGATIVE NG/ML
QUEST TRACKING HOUSE ACCOUNT: NORMAL
TAPENTADOL SAL QL SCN: NEGATIVE NG/ML
TRAMADOL SAL QL SCN: NEGATIVE NG/ML
ZOLPIDEM SAL QL SCN: NEGATIVE NG/ML

## 2025-03-21 DIAGNOSIS — R53.83 OTHER FATIGUE: ICD-10-CM

## 2025-03-21 DIAGNOSIS — Z12.31 VISIT FOR SCREENING MAMMOGRAM: ICD-10-CM

## 2025-03-21 DIAGNOSIS — J45.20 MILD INTERMITTENT ASTHMA, UNSPECIFIED WHETHER COMPLICATED (HHS-HCC): ICD-10-CM

## 2025-03-24 RX ORDER — FLUTICASONE FUROATE 200 UG/1
POWDER RESPIRATORY (INHALATION)
Qty: 30 EACH | Refills: 10 | Status: SHIPPED | OUTPATIENT
Start: 2025-03-24

## 2025-03-24 RX ORDER — MONTELUKAST SODIUM 10 MG/1
10 TABLET ORAL DAILY
Qty: 28 TABLET | Refills: 10 | Status: SHIPPED | OUTPATIENT
Start: 2025-03-24

## 2025-03-24 RX ORDER — VENLAFAXINE HYDROCHLORIDE 75 MG/1
75 CAPSULE, EXTENDED RELEASE ORAL DAILY
Qty: 28 CAPSULE | Refills: 10 | Status: SHIPPED | OUTPATIENT
Start: 2025-03-24

## 2025-03-24 RX ORDER — CETIRIZINE HYDROCHLORIDE 10 MG/1
10 TABLET ORAL DAILY
Qty: 28 TABLET | Refills: 10 | Status: SHIPPED | OUTPATIENT
Start: 2025-03-24

## 2025-03-24 RX ORDER — FERROUS SULFATE 325(65) MG
1 TABLET ORAL DAILY
Qty: 28 TABLET | Refills: 10 | Status: SHIPPED | OUTPATIENT
Start: 2025-03-24

## 2025-05-06 NOTE — PROGRESS NOTES
[facility]  Follow Up Office Visit Note 2025    Patient Information: Rita Sabillon, MRN: [id number], : 1970   Primary Care/Referring Physician: Michelle Griggs PA-C, [address] [facility] [address] / [place]     Chief Complaint: Neck pain  Interval History: At her last office visit no changes were made. A new oral toxicology was ordered    Today she reports no major changes since last seen. She feels that her pain has actually been better recently and she has been more active. Her oral toxicology was positive for oxycodone but again negative for buprenorphine. She states she has had the patches on regularly and has never had this problem until recently. She continues to report improvement in pain and function with minimal side effects from current Percocet and Belbuca    Brief History of Pain: Ms. Rita Sabillon is a 54 y.o. female with a PMHx of cerebral palsy, asthma who presents today for follow up regarding chronic neck pain and muscle spasms.            Current Pain Medications: Percocet 7.5/325 mg q8h PRN, Butrans patch 5 mcg/hr, Baclofen 20 mg TID, Cyclobenzaprine 5 mg q8h PRN, Diclofenac 50 mg BID, Gabapentin 600 mg TID  Previously Tried Pain Medications:    Relevant Surgeries: Leg muscle release surgeries in the s, extensive spine fusion in the   Injections: She reports getting injections in her neck in the past but they stopped working.  Physical/Occupational Therapy: The patient does not wish to pursue PT/OT at this time.    Medications:   Current Outpatient Medications   Medication Instructions    albuterol 90 mcg/actuation inhaler 2 puffs, inhalation, Every 6 hours PRN    albuterol 90 mcg/actuation inhaler 2 puffs, inhalation, Every 4 hours PRN    albuterol 90 mcg/actuation inhaler 2 puffs, inhalation, Every 6 hours PRN    Arnuity Ellipta 200 mcg/actuation inhaler INHALE ONE BLISTER WITH DEVICE ONCE A DAY    aspirin 81 mg, oral, Daily     atorvastatin (LIPITOR) 20 mg, oral, Daily    baclofen (Lioresal) 20 mg tablet 1 TAB BY MOUTH THREE TIMES A DAY    blood sugar diagnostic (Blood Glucose Test) strip Use to check your blood sugar twice daily    blood-glucose meter misc use to test blood sugar three times a day    [START ON 5/13/2025] buprenorphine (Butrans) 5 mcg/hour patch 1 patch, transdermal, Once Weekly    cetirizine (ZYRTEC) 10 mg, oral, Daily    cyclobenzaprine (Flexeril) 5 mg tablet 1 TAB BY MOUTH EVERY 8 HOURS AS NEEDED    Dexcom G7  misc Use as instructed    Dexcom G7 Sensor device For continuous glucose monitoring.  Change every 10 days.    diclofenac (Voltaren) 50 mg EC tablet Take 1 tablet (50 mg) by mouth once daily.    docusate sodium (Enemeez) 283 mg/5 mL enema 1 RECTALLY ONCE A DAY    doxepin (SINEquan) 50 mg capsule 1 CAP BY MOUTH DAILY AT BEDTIME    ergocalciferol (VITAMIN D-2) 1,250 mcg, oral, Once Weekly    famotidine (Pepcid) 40 mg tablet Take 1 tablet (40 mg) by mouth 2 times a day.    FeroSuL 325 mg, oral, Daily    fluconazole (DIFLUCAN) 150 mg, Once    fluticasone (Flonase) 50 mcg/actuation nasal spray 2 SPRAYS NASALLY ONCE A DAY AS DIRECTED    gabapentin (NEURONTIN) 600 mg, oral, 3 times daily    lancets misc use to test blood sugar three times a day    metFORMIN (GLUCOPHAGE) 500 mg, oral, 2 times daily before meals    metoprolol succinate XL (TOPROL-XL) 25 mg, oral, Daily    montelukast (SINGULAIR) 10 mg, oral, Daily    Motegrity 2 mg, 2 times daily    Myrbetriq 25 mg tablet extended release 24 hr 24 hr tablet TAKE ONE TABLET BY MOUTH ONCE DAILY Oral for 30    naloxone (Narcan) 4 mg/0.1 mL nasal spray ADMINISTER A SINGLE SPRAY INTRANASALLY INTO ONE NOSTRIL. CALL 911. MAY REPEAT X 1.    naratriptan (Amerge) 2.5 mg tablet 1 TAB BY MOUTH AS NEEDED AT ONSET OF MIGRAINE.;MAY REPEAT DOSE ONCE AFTER 4 HOURS IF NEEDED    nystatin (Mycostatin) 100,000 unit/gram powder APPLY TOPICALLY TO AFFECTED AREA TWICE A DAY AS DIRECTED     oxybutynin (Ditropan) 5 mg tablet 1 TAB BY MOUTH THREE TIMES A DAY    [START ON 6/14/2025] oxyCODONE-acetaminophen (Percocet) 7.5-325 mg tablet 1 tablet, oral, Every 8 hours PRN    [START ON 5/15/2025] oxyCODONE-acetaminophen (Percocet) 7.5-325 mg tablet 1 tablet, oral, Every 8 hours PRN    pantoprazole (ProtoNix) 40 mg EC tablet Every 24 hours    polyethylene glycol (Glycolax, Miralax) 17 gram/dose powder     spironolactone (ALDACTONE) 25 mg, oral, Daily    SSD 1 % cream APPLY TO AFFECTED AREAS ONCE A DAY    venlafaxine XR (EFFEXOR-XR) 75 mg, oral, Daily      Allergies:   No Known Allergies      Past Medical & Surgical History:  Past Medical History:   Diagnosis Date    Abnormal urine sediment 06/11/2019    Asthma     Cellulitis of right leg 08/01/2019    Cerebral palsy     Chronic pain syndrome 05/12/2021    Conjunctivitis 12/05/2023    Dyspnea 12/05/2023    Blanco catheter present 12/12/2020    Jaskaran hematuria 12/05/2023    Hemorrhagic cystitis 10/14/2021    Left ankle sprain 02/04/2020    Low back pain, unspecified 12/05/2023    Neuropathy     HANDS AND FEET    Osteoarthritis     Type 2 diabetes mellitus     Urinary pain 03/09/2020    Urinary tract infection 09/17/2020      Past Surgical History:   Procedure Laterality Date    OTHER SURGICAL HISTORY  07/13/2022    Back surgery    OTHER SURGICAL HISTORY  07/13/2022    Gallbladder surgery    OTHER SURGICAL HISTORY  07/20/2022    Suprapubic catheter insertion       No family history on file.  Social History     Socioeconomic History    Marital status: Single     Spouse name: Not on file    Number of children: Not on file    Years of education: Not on file    Highest education level: Not on file   Occupational History    Not on file   Tobacco Use    Smoking status: Never    Smokeless tobacco: Never   Vaping Use    Vaping status: Never Used   Substance and Sexual Activity    Alcohol use: Never    Drug use: Never    Sexual activity: Not on file   Other Topics Concern  "   Not on file   Social History Narrative    Not on file     Social Drivers of Health     Financial Resource Strain: Low Risk  (8/7/2024)    Overall Financial Resource Strain (CARDIA)     Difficulty of Paying Living Expenses: Not very hard   Food Insecurity: Not on file   Transportation Needs: No Transportation Needs (8/7/2024)    PRAPARE - Transportation     Lack of Transportation (Medical): No     Lack of Transportation (Non-Medical): No   Physical Activity: Not on file   Stress: Not on file   Social Connections: High Risk (4/21/2025)    Received from [facility] SDOH Screening     In the past year, have you been unable to get any of the following when you really needed them? choose all that apply.: Social or community engagement (examples: visiting or talking on the phone with friends and famil...   Intimate Partner Violence: Not on file   Housing Stability: Low Risk  (8/7/2024)    Housing Stability Vital Sign     Unable to Pay for Housing in the Last Year: No     Number of Times Moved in the Last Year: 1     Homeless in the Last Year: No       Problems, Past medical history, past surgical history, Medications, allergies, social and family history reviewed and as per the electronic medical record from today's encounter    Review of Systems:  CONST: No fever, chills, fatigue, weight changes  EYES: No loss of vision  ENT: No hearing loss, tinnitus  CV: No chest pain, palpitations  RESP: No dyspnea, shortness of breath, cough  GI: No stool incontinence, nausea, vomiting  : No urinary incontinence  MSK: No joint swelling  SKIN: No rash, no hives  NEURO: No headache, dizziness  PSYCH: No anxiety, depression  HEM/LYMPH: No easy bruising or bleeding  All other systems reviewed are negative     Physical Exam:  Vitals: /68   Pulse 96   Resp 18   Ht 1.448 m (4' 9\")   Wt 72.6 kg (160 lb)   LMP  (LMP Unknown)   SpO2 97%   BMI 34.62 kg/m²   General: No apparent distress. Alert, appropriate, oriented " x 3. Mood generally positive, affect congruent. Speaking in full sentences.   HENT: Normocephalic, atraumatic. Hearing intact.  Eyes: Pupils equal and round  Neck: Supple, trachea midline  Lungs: Symmetric respiratory excursion on visual exam, nonlabored breathing.   Extremities: No cyanosis noted in extremities.  Skin: No rashes, lesions noted.  Neuro: Alert and appropriate. Using a motorized wheelchair.    Laboratory Data:  The following laboratory data were reviewed during this visit:   Lab Results   Component Value Date    WBC 10.7 2024    RBC 4.72 2024    HGB 14.1 2024    HCT 42.8 2024     2024      Lab Results   Component Value Date    INR 1.0 10/08/2021     Lab Results   Component Value Date    CREATININE 0.31 (L) 2024    HGBA1C 5.8 2024       Imaging:  The following imaging impressions were reviewed by me during this visit:    - Imagin CT cervical spine shows DDD at C5-C6 with no significant canal stenosis seen. Mild cervical spondylosis resulting in mild narrowing of left-sided neural foramina at C5-C6 and C6-C7. Postoperative changes of the included upper thoracic spine.    I also personally reviewed the images from the above studies myself. These images and my interpretation of them contributed to the management and decision making of the patient's medical plan.    ASSESSMENT:  Ms. Rita Sabillon is a 54 y.o. female with neck pain that is consistent with:    1. 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 Long-term current use of opiate analgesic        PLAN:  Radiology: No new diagnostics at this time    Physically: No changes at this time    Psychologically: No needs at this time    Medication: No changes to medication made today. Refills for Percocet and Butrans provided today.  A repeat oral toxicology was ordered and was again negative for buprenorphine. I suspect this is more related to the low dose of the patch combined  with oral testing method. I have a low suspicion for medication misuse but will continue to monitor closely.    Duration: Multiple years    Intervention: No plans for intervention.    I will refill the patient's opioids today for 2 months.  The patient continues to see benefit and improvement in their quality of life and ability to maintain ADLs.  Patient educated about the risks of taking opioids and operating a motor vehicle.  Patient reports no adverse side effects to current medication regimen.  Current regimen does allow patient to maintain ADLs.  Patient reports no new neurologic symptoms, new pain areas, or exacerbation in pain today.  Patient reports they are happy with current treatment care path.    [place] was reviewed and was consistent with the history.    Patient has been educated on the risks, benefits, and alternatives of controlled substances as well as the proper way to store these medications.  The patient and I discussed the nature of this medication and its side effects.  We discussed tolerance, physical dependence, psychological dependence, addiction and opioid-induced hyperalgesia.  We discussed the potential need to wean from this medication.  We discussed the availability of programs that can help with this process if necessary.  We discussed safety issues related to opioids including safe storage.  We discussed the fact that the patient should not drive an automobile or operate heavy machinery while taking this medication.  A prescription for naloxone was offered to the patient.  The patient will be re-evaluated for the need to continue opioid therapy in 60-90 days.  A Pill Count Was Complete today and was consistent with prescriptions filled.  Most recent Toxicology reviewed and negative for buprenophrine. See discussion above. Will continue to monitor closely      Sincerely,  Sunil Morrow MD  [facility] [place]

## 2025-05-07 ENCOUNTER — OFFICE VISIT (OUTPATIENT)
Dept: PAIN MEDICINE | Facility: CLINIC | Age: 55
End: 2025-05-07
Payer: COMMERCIAL

## 2025-05-07 VITALS
SYSTOLIC BLOOD PRESSURE: 118 MMHG | WEIGHT: 160 LBS | RESPIRATION RATE: 18 BRPM | OXYGEN SATURATION: 97 % | HEIGHT: 57 IN | BODY MASS INDEX: 34.52 KG/M2 | HEART RATE: 96 BPM | DIASTOLIC BLOOD PRESSURE: 68 MMHG

## 2025-05-07 DIAGNOSIS — Z79.891 LONG-TERM CURRENT USE OF OPIATE ANALGESIC: ICD-10-CM

## 2025-05-07 DIAGNOSIS — G80.0 SPASTIC QUADRIPARESIS SECONDARY TO CEREBRAL PALSY (MULTI): Primary | ICD-10-CM

## 2025-05-07 PROCEDURE — 99214 OFFICE O/P EST MOD 30 MIN: CPT | Performed by: STUDENT IN AN ORGANIZED HEALTH CARE EDUCATION/TRAINING PROGRAM

## 2025-05-07 PROCEDURE — 3074F SYST BP LT 130 MM HG: CPT | Performed by: STUDENT IN AN ORGANIZED HEALTH CARE EDUCATION/TRAINING PROGRAM

## 2025-05-07 PROCEDURE — 1036F TOBACCO NON-USER: CPT | Performed by: STUDENT IN AN ORGANIZED HEALTH CARE EDUCATION/TRAINING PROGRAM

## 2025-05-07 PROCEDURE — 3078F DIAST BP <80 MM HG: CPT | Performed by: STUDENT IN AN ORGANIZED HEALTH CARE EDUCATION/TRAINING PROGRAM

## 2025-05-07 PROCEDURE — G2211 COMPLEX E/M VISIT ADD ON: HCPCS | Performed by: STUDENT IN AN ORGANIZED HEALTH CARE EDUCATION/TRAINING PROGRAM

## 2025-05-07 PROCEDURE — 3008F BODY MASS INDEX DOCD: CPT | Performed by: STUDENT IN AN ORGANIZED HEALTH CARE EDUCATION/TRAINING PROGRAM

## 2025-05-07 RX ORDER — OXYCODONE AND ACETAMINOPHEN 7.5; 325 MG/1; MG/1
1 TABLET ORAL EVERY 8 HOURS PRN
Qty: 90 TABLET | Refills: 0 | Status: SHIPPED | OUTPATIENT
Start: 2025-06-14 | End: 2025-07-14

## 2025-05-07 RX ORDER — OXYCODONE AND ACETAMINOPHEN 7.5; 325 MG/1; MG/1
1 TABLET ORAL EVERY 8 HOURS PRN
Qty: 90 TABLET | Refills: 0 | Status: SHIPPED | OUTPATIENT
Start: 2025-05-15 | End: 2025-06-14

## 2025-05-07 RX ORDER — BUPRENORPHINE 5 UG/H
1 PATCH TRANSDERMAL
Qty: 4 PATCH | Refills: 1 | Status: SHIPPED | OUTPATIENT
Start: 2025-05-13 | End: 2025-07-08

## 2025-05-07 ASSESSMENT — PATIENT HEALTH QUESTIONNAIRE - PHQ9
1. LITTLE INTEREST OR PLEASURE IN DOING THINGS: SEVERAL DAYS
2. FEELING DOWN, DEPRESSED OR HOPELESS: SEVERAL DAYS
SUM OF ALL RESPONSES TO PHQ9 QUESTIONS 1 AND 2: 2
10. IF YOU CHECKED OFF ANY PROBLEMS, HOW DIFFICULT HAVE THESE PROBLEMS MADE IT FOR YOU TO DO YOUR WORK, TAKE CARE OF THINGS AT HOME, OR GET ALONG WITH OTHER PEOPLE: NOT DIFFICULT AT ALL

## 2025-05-07 ASSESSMENT — PAIN SCALES - GENERAL
PAINLEVEL_OUTOF10: 4
PAINLEVEL_OUTOF10: 4

## 2025-05-07 ASSESSMENT — PAIN - FUNCTIONAL ASSESSMENT: PAIN_FUNCTIONAL_ASSESSMENT: 0-10

## 2025-05-07 NOTE — PROGRESS NOTES
MEDICATION NAME: Butrans  STRENGTH: 5mcg/hr  LAST FILL DATE: 4/15/25  DATE LAST TAKEN: 25  QUANTITY FILLED: 4  QUANTITY REMAININ  COUNT COMPLETED BY: JOHNNY AVINA MA and SONNY LEHMAN     MEDICATION NAME: Percocet  STRENGTH: 7.58/325mg  LAST FILL DATE: 4/15/25  DATE LAST TAKEN: 25  QUANTITY FILLED: 90  QUANTITY REMAININ  COUNT COMPLETED BY: JOHNNY AVINA MA and SONNY LEHMAN       UDS LAST COMPLETED:   CONTROLLED SUBSTANCES AGREEMENT LAST SIGNED:   ORT LAST COMPLETED:  Modified [employer] disability form filled out annually.       UDS LAST COMPLETED:   CONTROLLED SUBSTANCES AGREEMENT LAST SIGNED:   ORT LAST COMPLETED:  Modified [employer] disability form filled out annually.

## 2025-05-19 ENCOUNTER — TELEPHONE (OUTPATIENT)
Dept: ENDOCRINOLOGY | Facility: CLINIC | Age: 55
End: 2025-05-19

## 2025-05-19 ENCOUNTER — APPOINTMENT (OUTPATIENT)
Dept: ENDOCRINOLOGY | Facility: CLINIC | Age: 55
End: 2025-05-19
Payer: COMMERCIAL

## 2025-05-19 NOTE — TELEPHONE ENCOUNTER
Patient came in to the office she is asking for  orders for a new blood sugar meter, with test kit  Didn't get the dexcom g7 yet due to needing a p.a and never told, I called and got it started will work on it in the morning when I get the papers for it       [facility]

## 2025-05-23 DIAGNOSIS — E11.9 TYPE 2 DIABETES MELLITUS WITHOUT COMPLICATION, WITHOUT LONG-TERM CURRENT USE OF INSULIN: Primary | ICD-10-CM

## 2025-05-23 DIAGNOSIS — E11.9 TYPE 2 DIABETES MELLITUS WITHOUT COMPLICATION, WITHOUT LONG-TERM CURRENT USE OF INSULIN: ICD-10-CM

## 2025-05-23 RX ORDER — BLOOD-GLUCOSE METER
EACH MISCELLANEOUS
Qty: 300 STRIP | Refills: 3 | Status: SHIPPED | OUTPATIENT
Start: 2025-05-23

## 2025-05-23 RX ORDER — LANCETS 30 GAUGE
EACH MISCELLANEOUS
Qty: 1 EACH | Refills: 0 | Status: SHIPPED | OUTPATIENT
Start: 2025-05-23

## 2025-05-23 RX ORDER — LANCETS 33 GAUGE
EACH MISCELLANEOUS
Qty: 300 EACH | Refills: 3 | Status: SHIPPED | OUTPATIENT
Start: 2025-05-23

## 2025-05-23 RX ORDER — BLOOD-GLUCOSE SENSOR
EACH MISCELLANEOUS
Qty: 9 EACH | Refills: 3 | Status: SHIPPED | OUTPATIENT
Start: 2025-05-23

## 2025-06-09 ENCOUNTER — APPOINTMENT (OUTPATIENT)
Dept: ENDOCRINOLOGY | Facility: CLINIC | Age: 55
End: 2025-06-09
Payer: COMMERCIAL

## 2025-06-09 VITALS — SYSTOLIC BLOOD PRESSURE: 128 MMHG | HEART RATE: 92 BPM | DIASTOLIC BLOOD PRESSURE: 89 MMHG

## 2025-06-09 DIAGNOSIS — E11.9 TYPE 2 DIABETES MELLITUS WITHOUT COMPLICATION, WITHOUT LONG-TERM CURRENT USE OF INSULIN: ICD-10-CM

## 2025-06-09 LAB — POC HEMOGLOBIN A1C: 7.7 % (ref 4.2–6.5)

## 2025-06-09 PROCEDURE — 3079F DIAST BP 80-89 MM HG: CPT | Performed by: INTERNAL MEDICINE

## 2025-06-09 PROCEDURE — 1036F TOBACCO NON-USER: CPT | Performed by: INTERNAL MEDICINE

## 2025-06-09 PROCEDURE — 3051F HG A1C>EQUAL 7.0%<8.0%: CPT | Performed by: INTERNAL MEDICINE

## 2025-06-09 PROCEDURE — 83036 HEMOGLOBIN GLYCOSYLATED A1C: CPT | Performed by: INTERNAL MEDICINE

## 2025-06-09 PROCEDURE — 99214 OFFICE O/P EST MOD 30 MIN: CPT | Performed by: INTERNAL MEDICINE

## 2025-06-09 PROCEDURE — G2211 COMPLEX E/M VISIT ADD ON: HCPCS | Performed by: INTERNAL MEDICINE

## 2025-06-09 PROCEDURE — 3074F SYST BP LT 130 MM HG: CPT | Performed by: INTERNAL MEDICINE

## 2025-06-09 RX ORDER — METFORMIN HYDROCHLORIDE 1000 MG/1
1000 TABLET ORAL
Qty: 180 TABLET | Refills: 3 | Status: SHIPPED | OUTPATIENT
Start: 2025-06-09 | End: 2026-06-09

## 2025-06-09 ASSESSMENT — ENCOUNTER SYMPTOMS
CONSTIPATION: 1
DIARRHEA: 0
UNEXPECTED WEIGHT CHANGE: 0
ABDOMINAL PAIN: 0
ENDOCRINE COMMENTS: AS ABOVE

## 2025-06-09 ASSESSMENT — PATIENT HEALTH QUESTIONNAIRE - PHQ9
1. LITTLE INTEREST OR PLEASURE IN DOING THINGS: SEVERAL DAYS
SUM OF ALL RESPONSES TO PHQ9 QUESTIONS 1 AND 2: 2
2. FEELING DOWN, DEPRESSED OR HOPELESS: SEVERAL DAYS

## 2025-06-09 NOTE — PATIENT INSTRUCTIONS
A1c 7.7%    RECOMMENDATIONS  Increase Metformin to 1000 mg twice daily with meals    Follow up 3 months  A1c at next appointment  Other labs as ordered by ROULA Griggs

## 2025-06-09 NOTE — PROGRESS NOTES
History Of Present Illness  Rita Sabillon is a 54 y.o. female     Duration of type 2 diabetes mellitus:  11 months  Complications:  none     Patient is non-ambulatory due to cerebral palsy  Limited dexterity, no use of left hand  Preexisting neuropathy    Noted red pressure spot on left buttock, followed by home health aides.      Metformin 500 mg BID    Glimepiride discontinued     History of constipation, despite regular enemas  GI follow up    Glucose testing is sporadic by home health aides.   FreeStyle Hernandez denied by insurance  She was offered a DexCom trial.  Her aides will need to certify to use a medical device however.      Last eye exam:      Past Medical History  She has a past medical history of Abnormal urine sediment (06/11/2019), Asthma, Cellulitis of right leg (08/01/2019), Cerebral palsy, Chronic pain syndrome (05/12/2021), Conjunctivitis (12/05/2023), Dyspnea (12/05/2023), Blanco catheter present (12/12/2020), Jaskaran hematuria (12/05/2023), Hemorrhagic cystitis (10/14/2021), Left ankle sprain (02/04/2020), Low back pain, unspecified (12/05/2023), Neuropathy, Osteoarthritis, Type 2 diabetes mellitus, Urinary pain (03/09/2020), and Urinary tract infection (09/17/2020).    Surgical History  She has a past surgical history that includes Other surgical history (07/13/2022); Other surgical history (07/13/2022); and Other surgical history (07/20/2022).     Social History  She reports that she has never smoked. She has never used smokeless tobacco. She reports that she does not drink alcohol and does not use drugs.    Family History  Family History[1]    Medications  Current Outpatient Medications   Medication Instructions    albuterol 90 mcg/actuation inhaler 2 puffs, inhalation, Every 6 hours PRN    albuterol 90 mcg/actuation inhaler 2 puffs, inhalation, Every 4 hours PRN    albuterol 90 mcg/actuation inhaler 2 puffs, inhalation, Every 6 hours PRN    Arnuity Ellipta 200 mcg/actuation inhaler INHALE  ONE BLISTER WITH DEVICE ONCE A DAY    aspirin 81 mg, oral, Daily    atorvastatin (LIPITOR) 20 mg, oral, Daily    baclofen (Lioresal) 20 mg tablet 1 TAB BY MOUTH THREE TIMES A DAY    buprenorphine (Butrans) 5 mcg/hour patch 1 patch, transdermal, Once Weekly    cetirizine (ZYRTEC) 10 mg, oral, Daily    cyclobenzaprine (Flexeril) 5 mg tablet 1 TAB BY MOUTH EVERY 8 HOURS AS NEEDED    Dexcom G7  misc Use as instructed    Dexcom G7 Sensor device For continuous glucose monitoring.  Change every 10 days.    diclofenac (Voltaren) 50 mg EC tablet Take 1 tablet (50 mg) by mouth once daily.    docusate sodium (Enemeez) 283 mg/5 mL enema 1 RECTALLY ONCE A DAY    doxepin (SINEquan) 50 mg capsule 1 CAP BY MOUTH DAILY AT BEDTIME    ergocalciferol (VITAMIN D-2) 1,250 mcg, oral, Once Weekly    famotidine (Pepcid) 40 mg tablet Take 1 tablet (40 mg) by mouth 2 times a day.    FeroSuL 325 mg, oral, Daily    fluconazole (DIFLUCAN) 150 mg, Once    fluticasone (Flonase) 50 mcg/actuation nasal spray 2 SPRAYS NASALLY ONCE A DAY AS DIRECTED    gabapentin (NEURONTIN) 600 mg, oral, 3 times daily    metFORMIN (GLUCOPHAGE) 500 mg, oral, 2 times daily before meals    metoprolol succinate XL (TOPROL-XL) 25 mg, oral, Daily    montelukast (SINGULAIR) 10 mg, oral, Daily    Motegrity 2 mg, 2 times daily    Myrbetriq 25 mg tablet extended release 24 hr 24 hr tablet TAKE ONE TABLET BY MOUTH ONCE DAILY Oral for 30    naloxone (Narcan) 4 mg/0.1 mL nasal spray ADMINISTER A SINGLE SPRAY INTRANASALLY INTO ONE NOSTRIL. CALL 911. MAY REPEAT X 1.    naratriptan (Amerge) 2.5 mg tablet 1 TAB BY MOUTH AS NEEDED AT ONSET OF MIGRAINE.;MAY REPEAT DOSE ONCE AFTER 4 HOURS IF NEEDED    nystatin (Mycostatin) 100,000 unit/gram powder APPLY TOPICALLY TO AFFECTED AREA TWICE A DAY AS DIRECTED    OneTouch Delica Plus Lancet 33 gauge misc For glucose testing 3 times daily    OneTouch Verio Flex meter misc For glucose testing 3 times daily    OneTouch Verio test strips  For glucose testing 3 times daily    oxybutynin (Ditropan) 5 mg tablet 1 TAB BY MOUTH THREE TIMES A DAY    [START ON 6/14/2025] oxyCODONE-acetaminophen (Percocet) 7.5-325 mg tablet 1 tablet, oral, Every 8 hours PRN    oxyCODONE-acetaminophen (Percocet) 7.5-325 mg tablet 1 tablet, oral, Every 8 hours PRN    pantoprazole (ProtoNix) 40 mg EC tablet Every 24 hours    polyethylene glycol (Glycolax, Miralax) 17 gram/dose powder     spironolactone (ALDACTONE) 25 mg, oral, Daily    SSD 1 % cream APPLY TO AFFECTED AREAS ONCE A DAY    venlafaxine XR (EFFEXOR-XR) 75 mg, oral, Daily       Allergies  Patient has no known allergies.    Review of Systems   Constitutional:  Negative for unexpected weight change.   Eyes:  Negative for visual disturbance.   Cardiovascular:  Negative for chest pain.   Gastrointestinal:  Positive for constipation. Negative for abdominal pain and diarrhea.   Endocrine:        As above   Skin:         Left buttock pressure sore   Neurological:         Spasticity         Last Recorded Vitals  Blood pressure 128/89, pulse 92.    Physical Exam  Constitutional:       General: She is not in acute distress.     Comments: Examined in wheelchair   HENT:      Head: Normocephalic.      Mouth/Throat:      Mouth: Mucous membranes are moist.   Eyes:      Extraocular Movements: Extraocular movements intact.   Cardiovascular:      Pulses:           Radial pulses are 2+ on the right side and 2+ on the left side.   Musculoskeletal:      Right lower leg: No edema.      Left lower leg: No edema.      Comments: Lower legs everted   Neurological:      Mental Status: She is alert.      Motor: No tremor.      Comments: spasticity   Psychiatric:         Mood and Affect: Affect normal.          Relevant Results  Glucose (mg/dL)   Date Value   08/07/2024 251 (H)   08/06/2024 214 (H)   08/05/2024 264 (H)     POC HEMOGLOBIN A1c (%)   Date Value   06/09/2025 7.7 (A)   11/18/2024 5.8     Hemoglobin A1C (%)   Date Value   08/05/2024  10.5 (H)   05/11/2021 6.0     Bicarbonate (mmol/L)   Date Value   08/07/2024 25   08/06/2024 28   08/05/2024 28     Urea Nitrogen (mg/dL)   Date Value   08/07/2024 12   08/06/2024 8   08/05/2024 8     Creatinine (mg/dL)   Date Value   08/07/2024 0.31 (L)   08/06/2024 0.26 (L)   08/05/2024 0.33 (L)     Lab Results   Component Value Date    CHOL 151 05/11/2021    CHOL 255 (H) 02/16/2018     Lab Results   Component Value Date    HDL 32 (L) 05/11/2021    HDL 34 (L) 02/16/2018     Lab Results   Component Value Date    LDLCALC 66 05/11/2021    LDLCALC 179 (H) 02/16/2018     Lab Results   Component Value Date    TRIG 265 (H) 05/11/2021    TRIG 209 (H) 02/16/2018     Lab Results   Component Value Date    TSH 1.98 05/11/2021       IMPRESSION  TYPE 2 DIABETES MELLITUS  Rapid A1c 7.7%  A1c rising off glimepiride  Glucose testing limited by lack of dexterity, dependence on home health aides.      RECOMMENDATIONS  Increase Metformin to 1000 mg twice daily with meals    Follow up 3 months  A1c at next appointment  Other labs as ordered by ROULA Griggs         [1] No family history on file.

## 2025-06-10 ENCOUNTER — TELEPHONE (OUTPATIENT)
Dept: ENDOCRINOLOGY | Facility: CLINIC | Age: 55
End: 2025-06-10
Payer: COMMERCIAL

## 2025-06-10 NOTE — TELEPHONE ENCOUNTER
Patient called she hasn't been taking her metformin since October of last year, she is asking if she should start by taking 500 mg two times a day then go up from there.

## 2025-06-14 DIAGNOSIS — G80.0 SPASTIC QUADRIPLEGIC CEREBRAL PALSY (MULTI): ICD-10-CM

## 2025-06-16 RX ORDER — GABAPENTIN 600 MG/1
600 TABLET ORAL 3 TIMES DAILY
Qty: 90 TABLET | Refills: 0 | Status: SHIPPED | OUTPATIENT
Start: 2025-06-16

## 2025-07-02 ENCOUNTER — OFFICE VISIT (OUTPATIENT)
Dept: PAIN MEDICINE | Facility: CLINIC | Age: 55
End: 2025-07-02
Payer: COMMERCIAL

## 2025-07-02 VITALS
OXYGEN SATURATION: 92 % | RESPIRATION RATE: 18 BRPM | WEIGHT: 160 LBS | BODY MASS INDEX: 34.52 KG/M2 | DIASTOLIC BLOOD PRESSURE: 68 MMHG | HEIGHT: 57 IN | SYSTOLIC BLOOD PRESSURE: 126 MMHG | HEART RATE: 112 BPM

## 2025-07-02 DIAGNOSIS — R25.2 SPASTICITY: ICD-10-CM

## 2025-07-02 DIAGNOSIS — Z79.891 LONG-TERM CURRENT USE OF OPIATE ANALGESIC: ICD-10-CM

## 2025-07-02 DIAGNOSIS — G80.0 SPASTIC QUADRIPARESIS SECONDARY TO CEREBRAL PALSY (MULTI): Primary | ICD-10-CM

## 2025-07-02 PROCEDURE — 3051F HG A1C>EQUAL 7.0%<8.0%: CPT | Performed by: STUDENT IN AN ORGANIZED HEALTH CARE EDUCATION/TRAINING PROGRAM

## 2025-07-02 PROCEDURE — 99214 OFFICE O/P EST MOD 30 MIN: CPT | Performed by: STUDENT IN AN ORGANIZED HEALTH CARE EDUCATION/TRAINING PROGRAM

## 2025-07-02 PROCEDURE — 1036F TOBACCO NON-USER: CPT | Performed by: STUDENT IN AN ORGANIZED HEALTH CARE EDUCATION/TRAINING PROGRAM

## 2025-07-02 PROCEDURE — G2211 COMPLEX E/M VISIT ADD ON: HCPCS | Performed by: STUDENT IN AN ORGANIZED HEALTH CARE EDUCATION/TRAINING PROGRAM

## 2025-07-02 PROCEDURE — 3078F DIAST BP <80 MM HG: CPT | Performed by: STUDENT IN AN ORGANIZED HEALTH CARE EDUCATION/TRAINING PROGRAM

## 2025-07-02 PROCEDURE — 3008F BODY MASS INDEX DOCD: CPT | Performed by: STUDENT IN AN ORGANIZED HEALTH CARE EDUCATION/TRAINING PROGRAM

## 2025-07-02 PROCEDURE — 3074F SYST BP LT 130 MM HG: CPT | Performed by: STUDENT IN AN ORGANIZED HEALTH CARE EDUCATION/TRAINING PROGRAM

## 2025-07-02 RX ORDER — OXYCODONE AND ACETAMINOPHEN 7.5; 325 MG/1; MG/1
1 TABLET ORAL EVERY 8 HOURS PRN
Qty: 90 TABLET | Refills: 0 | Status: SHIPPED | OUTPATIENT
Start: 2025-08-17 | End: 2025-09-16

## 2025-07-02 RX ORDER — CYCLOBENZAPRINE HCL 5 MG
5 TABLET ORAL 3 TIMES DAILY PRN
Qty: 30 TABLET | Refills: 2 | Status: SHIPPED | OUTPATIENT
Start: 2025-07-02 | End: 2025-08-01

## 2025-07-02 RX ORDER — BUPRENORPHINE 5 UG/H
1 PATCH TRANSDERMAL
Qty: 4 PATCH | Refills: 1 | Status: SHIPPED | OUTPATIENT
Start: 2025-07-15 | End: 2025-09-09

## 2025-07-02 RX ORDER — OXYCODONE AND ACETAMINOPHEN 7.5; 325 MG/1; MG/1
1 TABLET ORAL EVERY 8 HOURS PRN
Qty: 90 TABLET | Refills: 0 | Status: SHIPPED | OUTPATIENT
Start: 2025-07-18 | End: 2025-08-17

## 2025-07-02 ASSESSMENT — PAIN SCALES - GENERAL
PAINLEVEL_OUTOF10: 5
PAINLEVEL_OUTOF10: 5 - MODERATE PAIN

## 2025-07-02 ASSESSMENT — PATIENT HEALTH QUESTIONNAIRE - PHQ9
SUM OF ALL RESPONSES TO PHQ9 QUESTIONS 1 AND 2: 2
2. FEELING DOWN, DEPRESSED OR HOPELESS: SEVERAL DAYS
1. LITTLE INTEREST OR PLEASURE IN DOING THINGS: SEVERAL DAYS

## 2025-07-02 ASSESSMENT — PAIN - FUNCTIONAL ASSESSMENT: PAIN_FUNCTIONAL_ASSESSMENT: 0-10

## 2025-07-02 ASSESSMENT — PAIN DESCRIPTION - DESCRIPTORS: DESCRIPTORS: ACHING;THROBBING

## 2025-07-02 NOTE — PROGRESS NOTES
MEDICATION NAME: Butrans  STRENGTH: 5mcg/hr  LAST FILL DATE: 25  DATE LAST TAKEN:   QUANTITY FILLED: 4  QUANTITY REMAININ  COUNT COMPLETED BY: NED SIMON RN and SONNY LEHMAN     MEDICATION NAME: Percocet  STRENGTH: 7.5/325mg  LAST FILL DATE: 25  DATE LAST TAKEN: 25  QUANTITY FILLED: 90  QUANTITY REMAININ  COUNT COMPLETED BY: ROULA GRANADO RN and SONNY LEHMAN       UDS COMPLETED  CONTROLLED SUBSTANCES AGREEMENT SIGNED  ORT COMPLETED  Modified [employer] disability form filled out annually.       UDS COMPLETED  CONTROLLED SUBSTANCES AGREEMENT SIGNED  ORT COMPLETED  Modified [employer] disability form filled out annually.

## 2025-07-02 NOTE — PROGRESS NOTES
[facility]  Follow Up Office Visit Note 2025    Patient Information: Rita Sabillon, MRN: [id number], : 1970   Primary Care/Referring Physician: Michelle Griggs PA-C, [address] [facility] / [place]*     Chief Complaint: Neck pain  Interval History: At her last office visit no changes were made.     Today she reports doing slightly worse since last seen. She has been dealing with a pressure ulcer on her buttock which has increased her pain. She has spent more time in bed due to this issue.  She is not following with wound care and there is reportedly no concern for infection    She also does report being more constipated since last seen. The only thing that changed is that she stopped taking Glimepiride. She has been using a docusate enema and daily Miralax but continues to have infrequent bowel movements. She continues to report improvement in pain and function with minimal side effects from current Percocet and Belbuca    Brief History of Pain: Ms. Rita Sabillon is a 55 y.o. female with a PMHx of cerebral palsy, asthma who presents today for follow up regarding chronic neck pain and muscle spasms.            Current Pain Medications: Percocet 7.5/325 mg q8h PRN, Butrans patch 5 mcg/hr, Baclofen 20 mg TID, Cyclobenzaprine 5 mg q8h PRN, Diclofenac 50 mg BID, Gabapentin 600 mg TID  Previously Tried Pain Medications:    Relevant Surgeries: Leg muscle release surgeries in the , extensive spine fusion in the   Injections: She reports getting injections in her neck in the past but they stopped working.  Physical/Occupational Therapy: The patient does not wish to pursue PT/OT at this time.    Medications:   Current Outpatient Medications   Medication Instructions    albuterol 90 mcg/actuation inhaler 2 puffs, inhalation, Every 6 hours PRN    albuterol 90 mcg/actuation inhaler 2 puffs, inhalation, Every 4 hours PRN    albuterol 90 mcg/actuation  inhaler 2 puffs, inhalation, Every 6 hours PRN    Arnuity Ellipta 200 mcg/actuation inhaler INHALE ONE BLISTER WITH DEVICE ONCE A DAY    aspirin 81 mg, oral, Daily    atorvastatin (LIPITOR) 20 mg, oral, Daily    baclofen (Lioresal) 20 mg tablet 1 TAB BY MOUTH THREE TIMES A DAY    [START ON 7/15/2025] buprenorphine (Butrans) 5 mcg/hour patch 1 patch, transdermal, Once Weekly    cetirizine (ZYRTEC) 10 mg, oral, Daily    cyclobenzaprine (FLEXERIL) 5 mg, oral, 3 times daily PRN    Dexcom G7  misc Use as instructed    Dexcom G7 Sensor device For continuous glucose monitoring.  Change every 10 days.    diclofenac (Voltaren) 50 mg EC tablet Take 1 tablet (50 mg) by mouth once daily.    docusate sodium (Enemeez) 283 mg/5 mL enema 1 RECTALLY ONCE A DAY    doxepin (SINEquan) 50 mg capsule 1 CAP BY MOUTH DAILY AT BEDTIME    ergocalciferol (VITAMIN D-2) 1,250 mcg, oral, Once Weekly    famotidine (Pepcid) 40 mg tablet Take 1 tablet (40 mg) by mouth 2 times a day.    FeroSuL 325 mg, oral, Daily    fluconazole (DIFLUCAN) 150 mg, Once    fluticasone (Flonase) 50 mcg/actuation nasal spray 2 SPRAYS NASALLY ONCE A DAY AS DIRECTED    gabapentin (NEURONTIN) 600 mg, oral, 3 times daily    metFORMIN (GLUCOPHAGE) 1,000 mg, oral, 2 times daily before meals, Dose increase    metoprolol succinate XL (TOPROL-XL) 25 mg, oral, Daily    montelukast (SINGULAIR) 10 mg, oral, Daily    Motegrity 2 mg, 2 times daily    Myrbetriq 25 mg tablet extended release 24 hr 24 hr tablet TAKE ONE TABLET BY MOUTH ONCE DAILY Oral for 30    naloxone (Narcan) 4 mg/0.1 mL nasal spray ADMINISTER A SINGLE SPRAY INTRANASALLY INTO ONE NOSTRIL. CALL 911. MAY REPEAT X 1.    naratriptan (Amerge) 2.5 mg tablet 1 TAB BY MOUTH AS NEEDED AT ONSET OF MIGRAINE.;MAY REPEAT DOSE ONCE AFTER 4 HOURS IF NEEDED    nystatin (Mycostatin) 100,000 unit/gram powder APPLY TOPICALLY TO AFFECTED AREA TWICE A DAY AS DIRECTED    OneTouch Delica Plus Lancet 33 gauge misc For glucose  testing 3 times daily    OneTouch Verio Flex meter misc For glucose testing 3 times daily    OneTouch Verio test strips For glucose testing 3 times daily    oxybutynin (Ditropan) 5 mg tablet 1 TAB BY MOUTH THREE TIMES A DAY    [START ON 8/17/2025] oxyCODONE-acetaminophen (Percocet) 7.5-325 mg tablet 1 tablet, oral, Every 8 hours PRN    [START ON 7/18/2025] oxyCODONE-acetaminophen (Percocet) 7.5-325 mg tablet 1 tablet, oral, Every 8 hours PRN    pantoprazole (ProtoNix) 40 mg EC tablet Every 24 hours    polyethylene glycol (Glycolax, Miralax) 17 gram/dose powder     spironolactone (ALDACTONE) 25 mg, oral, Daily    SSD 1 % cream APPLY TO AFFECTED AREAS ONCE A DAY    venlafaxine XR (EFFEXOR-XR) 75 mg, oral, Daily      Allergies:   No Known Allergies      Past Medical & Surgical History:  Past Medical History:   Diagnosis Date    Abnormal urine sediment 06/11/2019    Asthma     Cellulitis of right leg 08/01/2019    Cerebral palsy     Chronic pain syndrome 05/12/2021    Conjunctivitis 12/05/2023    Dyspnea 12/05/2023    Blanco catheter present 12/12/2020    Jaskaran hematuria 12/05/2023    Hemorrhagic cystitis 10/14/2021    Left ankle sprain 02/04/2020    Low back pain, unspecified 12/05/2023    Neuropathy     HANDS AND FEET    Osteoarthritis     Type 2 diabetes mellitus     Urinary pain 03/09/2020    Urinary tract infection 09/17/2020      Past Surgical History:   Procedure Laterality Date    OTHER SURGICAL HISTORY  07/13/2022    Back surgery    OTHER SURGICAL HISTORY  07/13/2022    Gallbladder surgery    OTHER SURGICAL HISTORY  07/20/2022    Suprapubic catheter insertion       No family history on file.  Social History     Socioeconomic History    Marital status: Single     Spouse name: Not on file    Number of children: Not on file    Years of education: Not on file    Highest education level: Not on file   Occupational History    Not on file   Tobacco Use    Smoking status: Never    Smokeless tobacco: Never   Vaping Use     Vaping status: Never Used   Substance and Sexual Activity    Alcohol use: Never    Drug use: Never    Sexual activity: Not on file   Other Topics Concern    Not on file   Social History Narrative    Not on file     Social Drivers of Health     Financial Resource Strain: Low Risk  (8/7/2024)    Overall Financial Resource Strain (CARDIA)     Difficulty of Paying Living Expenses: Not very hard   Food Insecurity: Not on file   Transportation Needs: No Transportation Needs (8/7/2024)    PRAPARE - Transportation     Lack of Transportation (Medical): No     Lack of Transportation (Non-Medical): No   Physical Activity: Not on file   Stress: Not on file   Social Connections: High Risk (4/21/2025)    Received from [facility] SDOH Screening     In the past year, have you been unable to get any of the following when you really needed them? choose all that apply.: Social or community engagement (examples: visiting or talking on the phone with friends and famil...   Intimate Partner Violence: Not on file   Housing Stability: Low Risk  (8/7/2024)    Housing Stability Vital Sign     Unable to Pay for Housing in the Last Year: No     Number of Times Moved in the Last Year: 1     Homeless in the Last Year: No       Problems, Past medical history, past surgical history, Medications, allergies, social and family history reviewed and as per the electronic medical record from today's encounter    Review of Systems:  CONST: No fever, chills, fatigue, weight changes  EYES: No loss of vision  ENT: No hearing loss, tinnitus  CV: No chest pain, palpitations  RESP: No dyspnea, shortness of breath, cough  GI: No stool incontinence, nausea, vomiting. Reports constipation  : No urinary incontinence  MSK: No joint swelling  SKIN: No rash, no hives  NEURO: No headache, dizziness  PSYCH: No anxiety, depression  HEM/LYMPH: No easy bruising or bleeding  All other systems reviewed are negative     Physical Exam:  Vitals: /68    "Pulse (!) 112   Resp 18   Ht (!) 1.448 m (4' 9\")   Wt 72.6 kg (160 lb)   LMP  (LMP Unknown)   SpO2 92%   BMI 34.62 kg/m²   General: No apparent distress. Alert, appropriate, oriented x 3. Mood generally positive, affect congruent. Speaking in full sentences.   HENT: Normocephalic, atraumatic. Hearing intact.  Eyes: Pupils equal and round  Neck: Supple, trachea midline  Lungs: Symmetric respiratory excursion on visual exam, nonlabored breathing.   Extremities: No cyanosis noted in extremities.  Skin: No rashes, lesions noted on extremities  Neuro: Alert and appropriate. Using a motorized wheelchair.    Laboratory Data:  The following laboratory data were reviewed during this visit:   Lab Results   Component Value Date    WBC 10.7 2024    RBC 4.72 2024    HGB 14.1 2024    HCT 42.8 2024     2024      Lab Results   Component Value Date    INR 1.0 10/08/2021     Lab Results   Component Value Date    CREATININE 0.31 (L) 2024    HGBA1C 7.7 (A) 2025       Imaging:  The following imaging impressions were reviewed by me during this visit:    - Imagin CT cervical spine shows DDD at C5-C6 with no significant canal stenosis seen. Mild cervical spondylosis resulting in mild narrowing of left-sided neural foramina at C5-C6 and C6-C7. Postoperative changes of the included upper thoracic spine.    I also personally reviewed the images from the above studies myself. These images and my interpretation of them contributed to the management and decision making of the patient's medical plan.    ASSESSMENT:  Ms. Rita Sabillon is a 55 y.o. female with neck pain that is consistent with:    1. 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 Spasticity    3. Long-term current use of opiate analgesic          PLAN:  Radiology: No new diagnostics at this time    Physically: No changes at this time    Psychologically: No needs at this time    Medication: No changes " to medication made today. Refills for Percocet and Butrans provided today.  A repeat oral toxicology was ordered and was again negative for buprenorphine. I suspect this is more related to the low dose of the patch combined with oral testing method. I have a low suspicion for medication misuse but will continue to monitor closely.  - Would consider trial of Movantik in the future if her constipation does not improve    Duration: Multiple years    Intervention: No plans for intervention.    I will refill the patient's opioids today for 2 months.  The patient continues to see benefit and improvement in their quality of life and ability to maintain ADLs.  Patient educated about the risks of taking opioids and operating a motor vehicle.  Patient reports no adverse side effects to current medication regimen.  Current regimen does allow patient to maintain ADLs.  Patient reports no new neurologic symptoms, new pain areas, or exacerbation in pain today.  Patient reports they are happy with current treatment care path.    [place] was reviewed and was consistent with the history.    Patient has been educated on the risks, benefits, and alternatives of controlled substances as well as the proper way to store these medications.  The patient and I discussed the nature of this medication and its side effects.  We discussed tolerance, physical dependence, psychological dependence, addiction and opioid-induced hyperalgesia.  We discussed the potential need to wean from this medication.  We discussed the availability of programs that can help with this process if necessary.  We discussed safety issues related to opioids including safe storage.  We discussed the fact that the patient should not drive an automobile or operate heavy machinery while taking this medication.  A prescription for naloxone was offered to the patient.  The patient will be re-evaluated for the need to continue opioid therapy in 60-90 days.  A Pill Count Was  Complete today and was consistent with prescriptions filled.  Most recent Toxicology reviewed and negative for buprenophrine. See discussion above. Will continue to monitor closely      Sincerely,  Sunil Morrow MD  [facility] - [place]

## 2025-07-15 NOTE — TELEPHONE ENCOUNTER
Telephone call to patient. Advised patient to start tapering Belbuca to 1 film a day until the medication runs out. Advised patient if pain worsens once stopping medications, she can discuss other options with Dr. Morrow, since Belbuca will no longer be covered by her insurance. Patient showed understanding.    100.4 F

## 2025-08-09 DIAGNOSIS — I73.9 PERIPHERAL VASCULAR DISEASE, UNSPECIFIED: ICD-10-CM

## 2025-08-09 DIAGNOSIS — R03.0 ELEVATED BLOOD PRESSURE READING: ICD-10-CM

## 2025-08-09 DIAGNOSIS — G80.0 SPASTIC QUADRIPLEGIC CEREBRAL PALSY (MULTI): ICD-10-CM

## 2025-08-09 DIAGNOSIS — G80.0 SPASTIC QUADRIPARESIS SECONDARY TO CEREBRAL PALSY (MULTI): ICD-10-CM

## 2025-08-09 DIAGNOSIS — E78.5 HYPERLIPIDEMIA, UNSPECIFIED HYPERLIPIDEMIA TYPE: ICD-10-CM

## 2025-08-11 RX ORDER — ATORVASTATIN CALCIUM 20 MG/1
20 TABLET, FILM COATED ORAL DAILY
Qty: 28 TABLET | Refills: 2 | Status: SHIPPED | OUTPATIENT
Start: 2025-08-11

## 2025-08-11 RX ORDER — GABAPENTIN 600 MG/1
TABLET ORAL
Qty: 84 TABLET | Refills: 0 | Status: SHIPPED | OUTPATIENT
Start: 2025-08-11

## 2025-08-11 RX ORDER — OXYBUTYNIN CHLORIDE 5 MG/1
TABLET ORAL
Qty: 84 TABLET | Refills: 2 | Status: SHIPPED | OUTPATIENT
Start: 2025-08-11

## 2025-08-11 RX ORDER — SPIRONOLACTONE 25 MG/1
25 TABLET ORAL DAILY
Qty: 28 TABLET | Refills: 2 | Status: SHIPPED | OUTPATIENT
Start: 2025-08-11

## 2025-08-11 RX ORDER — METOPROLOL SUCCINATE 25 MG/1
25 TABLET, EXTENDED RELEASE ORAL DAILY
Qty: 28 TABLET | Refills: 2 | Status: SHIPPED | OUTPATIENT
Start: 2025-08-11

## 2025-08-11 RX ORDER — SILVER SULFADIAZINE 10 G/1000G
CREAM TOPICAL
Qty: 85 G | Refills: 2 | Status: SHIPPED | OUTPATIENT
Start: 2025-08-11

## 2025-08-11 RX ORDER — ASPIRIN 81 MG/1
81 TABLET ORAL DAILY
Qty: 28 TABLET | Refills: 2 | Status: SHIPPED | OUTPATIENT
Start: 2025-08-11

## 2025-08-19 RX ORDER — BUPRENORPHINE 5 UG/H
1 PATCH TRANSDERMAL
Qty: 4 PATCH | Refills: 0 | Status: SHIPPED | OUTPATIENT
Start: 2025-08-24 | End: 2025-09-21

## 2025-08-21 ENCOUNTER — TELEPHONE (OUTPATIENT)
Dept: PAIN MEDICINE | Facility: CLINIC | Age: 55
End: 2025-08-21

## 2025-08-21 ENCOUNTER — APPOINTMENT (OUTPATIENT)
Dept: PRIMARY CARE | Facility: CLINIC | Age: 55
End: 2025-08-21
Payer: COMMERCIAL

## 2025-08-21 VITALS
DIASTOLIC BLOOD PRESSURE: 78 MMHG | RESPIRATION RATE: 18 BRPM | SYSTOLIC BLOOD PRESSURE: 126 MMHG | OXYGEN SATURATION: 97 % | HEART RATE: 105 BPM

## 2025-08-21 DIAGNOSIS — E11.40 TYPE 2 DIABETES MELLITUS WITH DIABETIC NEUROPATHY, WITHOUT LONG-TERM CURRENT USE OF INSULIN: ICD-10-CM

## 2025-08-21 DIAGNOSIS — G62.9 NEUROPATHY: Primary | ICD-10-CM

## 2025-08-21 DIAGNOSIS — R03.0 ELEVATED BP WITHOUT DIAGNOSIS OF HYPERTENSION: ICD-10-CM

## 2025-08-21 DIAGNOSIS — E78.2 MIXED HYPERLIPIDEMIA: ICD-10-CM

## 2025-08-21 DIAGNOSIS — R03.0 ELEVATED BLOOD PRESSURE READING: ICD-10-CM

## 2025-08-21 DIAGNOSIS — Z93.6 OTHER ARTIFICIAL OPENINGS OF URINARY TRACT STATUS: ICD-10-CM

## 2025-08-21 PROCEDURE — 3051F HG A1C>EQUAL 7.0%<8.0%: CPT | Performed by: PHYSICIAN ASSISTANT

## 2025-08-21 PROCEDURE — 3074F SYST BP LT 130 MM HG: CPT | Performed by: PHYSICIAN ASSISTANT

## 2025-08-21 PROCEDURE — 1036F TOBACCO NON-USER: CPT | Performed by: PHYSICIAN ASSISTANT

## 2025-08-21 PROCEDURE — 3078F DIAST BP <80 MM HG: CPT | Performed by: PHYSICIAN ASSISTANT

## 2025-08-21 PROCEDURE — G2211 COMPLEX E/M VISIT ADD ON: HCPCS | Performed by: PHYSICIAN ASSISTANT

## 2025-08-21 PROCEDURE — 99213 OFFICE O/P EST LOW 20 MIN: CPT | Performed by: PHYSICIAN ASSISTANT

## 2025-08-21 ASSESSMENT — COLUMBIA-SUICIDE SEVERITY RATING SCALE - C-SSRS: 1. IN THE PAST MONTH, HAVE YOU WISHED YOU WERE DEAD OR WISHED YOU COULD GO TO SLEEP AND NOT WAKE UP?: NO

## 2025-08-21 ASSESSMENT — PAIN SCALES - GENERAL: PAINLEVEL_OUTOF10: 7

## 2025-08-22 PROBLEM — Z93.6 OTHER ARTIFICIAL OPENINGS OF URINARY TRACT STATUS: Status: ACTIVE | Noted: 2025-08-22

## 2025-08-22 PROBLEM — E11.40 TYPE 2 DIABETES MELLITUS WITH DIABETIC NEUROPATHY, WITHOUT LONG-TERM CURRENT USE OF INSULIN: Status: ACTIVE | Noted: 2025-08-22

## 2025-08-22 ASSESSMENT — ENCOUNTER SYMPTOMS
ARTHRALGIAS: 1
MYALGIAS: 1
FATIGUE: 1
LIGHT-HEADEDNESS: 0
DIZZINESS: 0
BACK PAIN: 1
NUMBNESS: 1
JOINT SWELLING: 0
HEADACHES: 0

## 2025-08-27 ENCOUNTER — APPOINTMENT (OUTPATIENT)
Dept: PAIN MEDICINE | Facility: CLINIC | Age: 55
End: 2025-08-27
Payer: COMMERCIAL

## 2025-09-02 DIAGNOSIS — J45.20 MILD INTERMITTENT ASTHMA, UNSPECIFIED WHETHER COMPLICATED (HHS-HCC): Primary | ICD-10-CM

## 2025-09-02 RX ORDER — PEN NEEDLE, DIABETIC 31 GX5/16"
NEEDLE, DISPOSABLE MISCELLANEOUS
Qty: 1 EACH | Refills: 0 | Status: SHIPPED | OUTPATIENT
Start: 2025-09-02

## 2025-09-02 RX ORDER — ALBUTEROL SULFATE 0.83 MG/ML
2.5 SOLUTION RESPIRATORY (INHALATION) 4 TIMES DAILY PRN
Qty: 120 ML | Refills: 2 | Status: SHIPPED | OUTPATIENT
Start: 2025-09-02 | End: 2026-09-02

## 2025-09-29 ENCOUNTER — APPOINTMENT (OUTPATIENT)
Dept: ENDOCRINOLOGY | Facility: CLINIC | Age: 55
End: 2025-09-29
Payer: COMMERCIAL

## 2025-11-21 ENCOUNTER — APPOINTMENT (OUTPATIENT)
Dept: PRIMARY CARE | Facility: CLINIC | Age: 55
End: 2025-11-21
Payer: COMMERCIAL